# Patient Record
Sex: MALE | Race: BLACK OR AFRICAN AMERICAN | Employment: UNEMPLOYED | URBAN - METROPOLITAN AREA
[De-identification: names, ages, dates, MRNs, and addresses within clinical notes are randomized per-mention and may not be internally consistent; named-entity substitution may affect disease eponyms.]

---

## 2019-04-24 ENCOUNTER — HOSPITAL ENCOUNTER (EMERGENCY)
Age: 49
Discharge: HOME | End: 2019-04-25
Attending: EMERGENCY MEDICINE | Admitting: EMERGENCY MEDICINE
Payer: SELF-PAY

## 2019-04-24 ENCOUNTER — APPOINTMENT (OUTPATIENT)
Dept: GENERAL RADIOLOGY | Age: 49
End: 2019-04-24
Attending: EMERGENCY MEDICINE
Payer: SELF-PAY

## 2019-04-24 VITALS
BODY MASS INDEX: 27.32 KG/M2 | SYSTOLIC BLOOD PRESSURE: 146 MMHG | DIASTOLIC BLOOD PRESSURE: 97 MMHG | OXYGEN SATURATION: 96 % | HEART RATE: 75 BPM | WEIGHT: 185 LBS | RESPIRATION RATE: 16 BRPM | TEMPERATURE: 98.1 F
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
## 2019-04-24 DIAGNOSIS — S01.81XA FACIAL LACERATION, INITIAL ENCOUNTER: Primary | ICD-10-CM

## 2019-04-24 DIAGNOSIS — F10.929 ALCOHOLIC INTOXICATION WITH COMPLICATION (HCC): ICD-10-CM

## 2019-04-24 PROCEDURE — 90471 IMMUNIZATION ADMIN: CPT

## 2019-04-24 PROCEDURE — 99282 EMERGENCY DEPT VISIT SF MDM: CPT

## 2019-04-24 PROCEDURE — 70150 X-RAY EXAM OF FACIAL BONES: CPT

## 2019-04-24 RX ORDER — LIDOCAINE HYDROCHLORIDE 10 MG/ML
10 INJECTION, SOLUTION EPIDURAL; INFILTRATION; INTRACAUDAL; PERINEURAL ONCE
Status: COMPLETED | OUTPATIENT
Start: 2019-04-24 | End: 2019-04-25

## 2019-04-25 PROCEDURE — 90471 IMMUNIZATION ADMIN: CPT

## 2019-04-25 PROCEDURE — 77030018836 HC SOL IRR NACL ICUM -A

## 2019-04-25 PROCEDURE — 74011250637 HC RX REV CODE- 250/637: Performed by: EMERGENCY MEDICINE

## 2019-04-25 PROCEDURE — 74011000250 HC RX REV CODE- 250: Performed by: EMERGENCY MEDICINE

## 2019-04-25 PROCEDURE — 77030031132 HC SUT NYL COVD -A

## 2019-04-25 PROCEDURE — 90715 TDAP VACCINE 7 YRS/> IM: CPT | Performed by: EMERGENCY MEDICINE

## 2019-04-25 PROCEDURE — 74011250636 HC RX REV CODE- 250/636: Performed by: EMERGENCY MEDICINE

## 2019-04-25 PROCEDURE — 77030031139 HC SUT VCRL2 J&J -A

## 2019-04-25 PROCEDURE — 75810000294 HC INTERM/LAYERED WND RPR

## 2019-04-25 RX ORDER — CEPHALEXIN 250 MG/1
250 CAPSULE ORAL 4 TIMES DAILY
Qty: 20 CAP | Refills: 0 | Status: SHIPPED | OUTPATIENT
Start: 2019-04-25 | End: 2019-04-30

## 2019-04-25 RX ORDER — BACITRACIN ZINC 500 UNIT/G
OINTMENT (GRAM) TOPICAL
Status: COMPLETED | OUTPATIENT
Start: 2019-04-25 | End: 2019-04-25

## 2019-04-25 RX ORDER — CEPHALEXIN 250 MG/1
500 CAPSULE ORAL
Status: COMPLETED | OUTPATIENT
Start: 2019-04-25 | End: 2019-04-25

## 2019-04-25 RX ADMIN — LIDOCAINE HYDROCHLORIDE 10 ML: 10 INJECTION, SOLUTION EPIDURAL; INFILTRATION; INTRACAUDAL; PERINEURAL at 00:05

## 2019-04-25 RX ADMIN — BACITRACIN ZINC: 500 OINTMENT TOPICAL at 00:44

## 2019-04-25 RX ADMIN — CEPHALEXIN 500 MG: 250 CAPSULE ORAL at 00:52

## 2019-04-25 RX ADMIN — TETANUS TOXOID, REDUCED DIPHTHERIA TOXOID AND ACELLULAR PERTUSSIS VACCINE, ADSORBED 0.5 ML: 5; 2.5; 8; 8; 2.5 SUSPENSION INTRAMUSCULAR at 00:05

## 2019-04-25 NOTE — DISCHARGE INSTRUCTIONS
Return for pain, redness, swelling, warmth, fever not resolving with motrin or tylenol, shortness of breath, vomiting, decreased fluid intake, weakness, numbness, dizziness, or any change or concerns. Sutures need to be removed in 5 days. Patient Education        Cuts Closed With Stitches: Care Instructions  Your Care Instructions  A cut can happen anywhere on your body. The doctor used stitches to close the cut. Using stitches also helps the cut heal and reduces scarring. Sometimes pieces of tape called Steri-Strips are put over the stitches. If the cut went deep and through the skin, the doctor may have put in two layers of stitches. The deeper layer brings the deep part of the cut together. These stitches will dissolve and don't need to be removed. The stitches in the upper layer are the ones you see on the cut. You will probably have a bandage over the stitches. You will need to have the stitches removed, usually in 7 to 14 days. The doctor has checked you carefully, but problems can develop later. If you notice any problems or new symptoms, get medical treatment right away. Follow-up care is a key part of your treatment and safety. Be sure to make and go to all appointments, and call your doctor if you are having problems. It's also a good idea to know your test results and keep a list of the medicines you take. How can you care for yourself at home? · Keep the cut dry for the first 24 to 48 hours. After this, you can shower if your doctor okays it. Pat the cut dry. · Don't soak the cut, such as in a bathtub. Your doctor will tell you when it's safe to get the cut wet. · If your doctor told you how to care for your cut, follow your doctor's instructions. If you did not get instructions, follow this general advice:  ? After the first 24 to 48 hours, wash around the cut with clean water 2 times a day. Don't use hydrogen peroxide or alcohol, which can slow healing.   ? You may cover the cut with a thin layer of petroleum jelly, such as Vaseline, and a nonstick bandage. ? Apply more petroleum jelly and replace the bandage as needed. · Prop up the sore area on a pillow anytime you sit or lie down during the next 3 days. Try to keep it above the level of your heart. This will help reduce swelling. · Avoid any activity that could cause your cut to reopen. · Do not remove the stitches on your own. Your doctor will tell you when to come back to have the stitches removed. · Leave Steri-Strips on until they fall off. · Be safe with medicines. Read and follow all instructions on the label. ? If the doctor gave you a prescription medicine for pain, take it as prescribed. ? If you are not taking a prescription pain medicine, ask your doctor if you can take an over-the-counter medicine. When should you call for help? Call your doctor now or seek immediate medical care if:    · You have new pain, or your pain gets worse.     · The skin near the cut is cold or pale or changes color.     · You have tingling, weakness, or numbness near the cut.     · The cut starts to bleed, and blood soaks through the bandage. Oozing small amounts of blood is normal.     · You have trouble moving the area near the cut.     · You have symptoms of infection, such as:  ? Increased pain, swelling, warmth, or redness around the cut.  ? Red streaks leading from the cut.  ? Pus draining from the cut.  ? A fever.    Watch closely for changes in your health, and be sure to contact your doctor if:    · The cut reopens.     · You do not get better as expected. Where can you learn more? Go to http://vilma-didi.info/. Enter R217 in the search box to learn more about \"Cuts Closed With Stitches: Care Instructions. \"  Current as of: September 23, 2018  Content Version: 11.9  © 9897-2481 [employer].  Care instructions adapted under license by [employer] (which disclaims liability or warranty for this information). If you have questions about a medical condition or this instruction, always ask your healthcare professional. [place] [address] any warranty or liability for your use of this information.

## 2019-04-25 NOTE — ED PROVIDER NOTES
Pt c/o facial wound. States drank gin with friends, was walking home about 1 hour pta, when tripped and hit a pole, thinks was wood, but doubts foreign body. Says mild bleeding from wound but resolved from pressure. Denies loc. No neck or back  Pain. No tet for over 10 yrs. No weakness or numbness. Pain mild. Past Medical History:  
Diagnosis Date  Ill-defined condition ETOH History reviewed. No pertinent surgical history. History reviewed. No pertinent family history. Social History Socioeconomic History  Marital status: SINGLE Spouse name: Not on file  Number of children: Not on file  Years of education: Not on file  Highest education level: Not on file Occupational History  Not on file Social Needs  Financial resource strain: Not on file  Food insecurity:  
  Worry: Not on file Inability: Not on file  Transportation needs:  
  Medical: Not on file Non-medical: Not on file Tobacco Use  Smoking status: Former Smoker Substance and Sexual Activity  Alcohol use: Yes Comment: 1/2 pint liqur  Drug use: No  
 Sexual activity: Not on file Lifestyle  Physical activity:  
  Days per week: Not on file Minutes per session: Not on file  Stress: Not on file Relationships  Social connections:  
  Talks on phone: Not on file Gets together: Not on file Attends Nondenominational service: Not on file Active member of club or organization: Not on file Attends meetings of clubs or organizations: Not on file Relationship status: Not on file  Intimate partner violence:  
  Fear of current or ex partner: Not on file Emotionally abused: Not on file Physically abused: Not on file Forced sexual activity: Not on file Other Topics Concern  Not on file Social History Narrative  Not on file ALLERGIES: Patient has no known allergies. Review of Systems Constitutional: Negative for diaphoresis and fever. HENT: Negative for congestion. Respiratory: Negative for shortness of breath. Cardiovascular: Negative for chest pain. Gastrointestinal: Negative for nausea. Musculoskeletal: Negative for back pain. Skin: Positive for wound. Neurological: Negative for dizziness and numbness. All other systems reviewed and are negative. Vitals:  
 04/24/19 2242 BP: (!) 146/97 Pulse: 75 Resp: 16 Temp: 98.1 °F (36.7 °C) SpO2: 96% Weight: 83.9 kg (185 lb) Physical Exam  
Constitutional: He is oriented to person, place, and time. He appears well-developed. HENT:  
Head: Normocephalic.  
+ 5 cm linear rt mid cheek wound. No bleeding. Deep through subcut layer. Face nv, inc sensation rt face throughout. Nl rom rt face. Eyes: Conjunctivae are normal.  
Neck: Normal range of motion. Cardiovascular: Normal rate and regular rhythm. Pulmonary/Chest: Effort normal. He has no wheezes. Abdominal: Soft. There is no tenderness. Musculoskeletal: He exhibits no tenderness. Neurological: He is alert and oriented to person, place, and time. etoh on breath but non slurred speech, a and o x 3 Skin: Skin is warm and dry. Capillary refill takes less than 2 seconds. No rash noted. Psychiatric: He has a normal mood and affect. Nursing note and vitals reviewed. [facility] Wound Repair 
Date/Time: 4/25/2019 12:48 AM 
Performed by: attendingPreparation: skin prepped with Betadine Pre-procedure re-eval: Immediately prior to the procedure, the patient was reevaluated and found suitable for the planned procedure and any planned medications. Time out: Immediately prior to the procedure a time out was called to verify the correct patient, procedure, equipment, staff and marking as appropriate. Carmita Clark Location details: face Wound length:2.6 - 7.5 cm Anesthesia: local infiltration Anesthesia: 
 Local Anesthetic: lidocaine 1% without epinephrine Anesthetic total: 6 mL Foreign bodies: no foreign bodies Irrigation solution: saline Irrigation method: syringe Debridement: minimal 
Skin closure: 6-0 nylon Subcutaneous closure: Vicryl Number of sutures: 13 (3 subcut, 10 skin) Technique: simple and interrupted Approximation: close Dressing: antibiotic ointment and pressure dressing Patient tolerance: Patient tolerated the procedure well with no immediate complications My total time at bedside, performing this procedure was 16-30 minutes. Vitals: 
Patient Vitals for the past 12 hrs: 
 Temp Pulse Resp BP SpO2  
04/24/19 2242 98.1 °F (36.7 °C) 75 16 (!) 146/97 96 % Medications ordered:  
Medications diph,Pertuss(AC),Tet Vac-PF (BOOSTRIX) suspension 0.5 mL (0.5 mL IntraMUSCular Given 4/25/19 0005) lidocaine (PF) (XYLOCAINE) 10 mg/mL (1 %) injection 10 mL (10 mL SubCUTAneous Given by Provider 4/25/19 0005) bacitracin zinc (BACITRACIN) 500 unit/gram ointment ( Topical Given 4/25/19 0044) cephALEXin (KEFLEX) capsule 500 mg (500 mg Oral Given 4/25/19 0052) Lab findings: 
No results found for this or any previous visit (from the past 12 hour(s)). X-Ray, CT or other radiology findings or impressions: XR FACIAL BONES MIN 3 V    (Results Pending) Progress notes, Consult notes or additional Procedure notes:  
Wound closed w double layer closure after copious irrigation, 200 ml. Not c/w facial nerve injury. Nvi. No fb. Stable for dc and close f/u. No emc. Not c/w intracran injury or cerv injury. No ind for further imagine. 12:59 AM pt ambulated w no ataxia, non slurred speech, sister at bedside, says no monitor pt closely. Diagnosis: 1. Facial laceration, initial encounter 2. Fall, initial encounter 3. Alcoholic intoxication with complication ([facility] [address] Disposition: home Follow-up Information Follow up With Specialties Details Why Contact Info [facility] Schedule an appointment as soon as possible for a visit in 2 days  [address] 
[phone] [address] [facility] EMERGENCY DEPT Emergency Medicine Go to As needed [address] [id number] [phone] Patient's Medications Start Taking CEPHALEXIN (KEFLEX) 250 MG CAPSULE    Take 1 Cap by mouth four (4) times daily for 5 days. Continue Taking No medications on file These Medications have changed No medications on file Stop Taking No medications on file

## 2019-04-25 NOTE — ED NOTES
I have reviewed discharge instructions with the caregiver. The caregiver verbalized understanding. Medication teaching given, to include name, dose, action, and side effects. Patient verbalized understanding of medications. Encouraged patient to voice any concerns with reassurance provided. Patient armband removed and given to patient to take home. Patient was informed of the privacy risks if armband lost or stolen Patient Discharged in stable condition.

## 2019-05-10 ENCOUNTER — HOSPITAL ENCOUNTER (EMERGENCY)
Age: 49
Discharge: HOME | End: 2019-05-10
Attending: EMERGENCY MEDICINE
Payer: SELF-PAY

## 2019-05-10 VITALS
RESPIRATION RATE: 18 BRPM | SYSTOLIC BLOOD PRESSURE: 174 MMHG | TEMPERATURE: 99.1 F | HEART RATE: 68 BPM | WEIGHT: 180 LBS | DIASTOLIC BLOOD PRESSURE: 104 MMHG | HEIGHT: 71 IN | BODY MASS INDEX: 25.2 KG/M2 | OXYGEN SATURATION: 97 %

## 2019-05-10 DIAGNOSIS — Z48.02 VISIT FOR SUTURE REMOVAL: Primary | ICD-10-CM

## 2019-05-10 DIAGNOSIS — R03.0 ELEVATED BLOOD PRESSURE READING: ICD-10-CM

## 2019-05-10 PROCEDURE — 75810000275 HC EMERGENCY DEPT VISIT NO LEVEL OF CARE

## 2019-05-10 NOTE — ED PROVIDER NOTES
EMERGENCY DEPARTMENT HISTORY AND PHYSICAL EXAM 
 
9:39 AM 
 
 
Date: 5/10/2019 Patient Name: Tra Hogan History of Presenting Illness Chief Complaint Patient presents with  Suture Removal  
 
 
 
History Provided By: Patient Additional History (Context): Tra Hogan is a 53 y.o. male with Past medical history of alcohol abuse who presents with chief complaint of suture removal.  Patient states that stitches were placed in his right cheek area about 2 weeks ago and states this time to be removed. He denies any fever redness or drainage from the area. He does report that a couple of the sutures popped out some days ago. No other complaints. PCP: None Past History Past Medical History: 
Past Medical History:  
Diagnosis Date  Ill-defined condition ETOH Past Surgical History: 
History reviewed. No pertinent surgical history. Family History: 
History reviewed. No pertinent family history. Social History: 
Social History Tobacco Use  Smoking status: Former Smoker Substance Use Topics  Alcohol use: Yes Comment: 1/2 pint liqur  Drug use: No  
 
 
Allergies: 
No Known Allergies Review of Systems Review of Systems Constitutional: Negative for chills and fever. Gastrointestinal: Negative for nausea and vomiting. Musculoskeletal: Negative for arthralgias and neck stiffness. Skin: Negative for color change and rash. Stitches in right cheek area Neurological: Negative for dizziness, weakness, numbness and headaches. Hematological: Does not bruise/bleed easily. Psychiatric/Behavioral: Negative for confusion and dysphoric mood. All other systems reviewed and are negative. Physical Exam  
 
Visit Vitals BP (!) 174/104 (BP 1 Location: Left arm, BP Patient Position: Sitting) Pulse 68 Temp 99.1 °F (37.3 °C) Resp 18 Ht 5' 11\" (1.803 m) Wt 81.6 kg (180 lb) SpO2 97% BMI 25.10 kg/m² Physical Exam  
 Constitutional: He is oriented to person, place, and time. He appears well-developed and well-nourished. No distress. HENT:  
Head: Normocephalic and atraumatic. Mouth/Throat: Oropharynx is clear and moist.  
Eyes: Pupils are equal, round, and reactive to light. Conjunctivae are normal. No scleral icterus. Neck: Normal range of motion. Neck supple. Cardiovascular: Normal rate and intact distal pulses. Capillary refill < 3 seconds Pulmonary/Chest: Effort normal. No respiratory distress. Abdominal: Bowel sounds are normal.  
Musculoskeletal: Normal range of motion. Lymphadenopathy:  
  He has no cervical adenopathy. Neurological: He is alert and oriented to person, place, and time. No cranial nerve deficit. Skin: Skin is warm and dry. He is not diaphoretic. Sutures in right cheek area No erythema, no drainage, no signs of infection, nontender at the site Sutures appropriate to be removed Psychiatric: He has a normal mood and affect. His behavior is normal.  
Nursing note and vitals reviewed. Diagnostic Study Results Labs - No results found for this or any previous visit (from the past 12 hour(s)). Radiologic Studies - No orders to display Medical Decision Making I am the first provider for this patient. I reviewed the vital signs, available nursing notes, past medical history, past surgical history, family history and social history. Vital Signs-Reviewed the patient's vital signs. Records Reviewed: Nursing Notes and Old Medical Records (Time of Review: 9:39 AM) Provider Notes (Medical Decision Making): Here for suture removal 
 
No signs of infection, is appropriate to remove stitches MDM Medications - No data to display Procedures Suture removal: Wound cleaned with alcohol, 6 intact sutures removed, 2 partially intact sutures were removed, thus 8 of the skin sutures out of 10 remained (2 of those being partial sutures) and were removed. There were total of 10 sutures in the skin along with 3 deep subcutaneous initially patient states some popped out. Wound is well approximated and healing ED Course: Progress Notes, Reevaluation, and Consults: 
I have reassessed the patient. I have discussed the workup, results and plan with the patient and patient is in agreement. Patient was discharge in stable condition. Patient was given outpatient follow up. Patient is to return to emergency department if any new or worsening condition. Diagnosis Clinical Impression: 1. Visit for suture removal   
2. Elevated blood pressure reading Disposition: Discharged Follow-up Information Follow up With Specialties Details Why Contact [facility]  Schedule an appointment as soon as possible for a visit in 3 days  [address] [address] 
[phone] Evon [address]  Call today  DR. WEISS'S [address] [address] 
[phone] [facility] EMERGENCY DEPT Emergency Medicine  As needed, If symptoms worsen [address] [id number] [phone] Patient's Medications No medications on file DO Clary Haider medical dictation software was used for portions of this report. Unintended transcription errors may occur. My signature above authenticates this document and my orders, the final   
diagnosis (es), discharge prescription (s), and instructions in the Epic   
record.

## 2019-05-10 NOTE — DISCHARGE INSTRUCTIONS
Elevated Blood Pressure: Care Instructions  Your Care Instructions    Blood pressure is a measure of how hard the blood pushes against the walls of your arteries. It's normal for blood pressure to go up and down throughout the day. But if it stays up over time, you have high blood pressure. Two numbers tell you your blood pressure. The first number is the systolic pressure. It shows how hard the blood pushes when your heart is pumping. The second number is the diastolic pressure. It shows how hard the blood pushes between heartbeats, when your heart is relaxed and filling with blood. An ideal blood pressure in adults is less than 120/80 (say \"120 over 80\"). High blood pressure is 140/90 or higher. You have high blood pressure if your top number is 140 or higher or your bottom number is 90 or higher, or both. The main test for high blood pressure is simple, fast, and painless. To diagnose high blood pressure, your doctor will test your blood pressure at different times. After testing your blood pressure, your doctor may ask you to test it again when you are home. If you are diagnosed with high blood pressure, you can work with your doctor to make a long-term plan to manage it. Follow-up care is a key part of your treatment and safety. Be sure to make and go to all appointments, and call your doctor if you are having problems. It's also a good idea to know your test results and keep a list of the medicines you take. How can you care for yourself at home? · Do not smoke. Smoking increases your risk for heart attack and stroke. If you need help quitting, talk to your doctor about stop-smoking programs and medicines. These can increase your chances of quitting for good. · Stay at a healthy weight. · Try to limit how much sodium you eat to less than 2,300 milligrams (mg) a day. Your doctor may ask you to try to eat less than 1,500 mg a day. · Be physically active.  Get at least 30 minutes of exercise on most days of the week. Walking is a good choice. You also may want to do other activities, such as running, swimming, cycling, or playing tennis or team sports. · Avoid or limit alcohol. Talk to your doctor about whether you can drink any alcohol. · Eat plenty of fruits, vegetables, and low-fat dairy products. Eat less saturated and total fats. · Learn how to check your blood pressure at home. When should you call for help? Call your doctor now or seek immediate medical care if:  ? · Your blood pressure is much higher than normal (such as 180/110 or higher). ? · You think high blood pressure is causing symptoms such as:  ¨ Severe headache. ¨ Blurry vision. ? Watch closely for changes in your health, and be sure to contact your doctor if:  ? · You do not get better as expected. Where can you learn more? Go to http://vilmaActimis Pharmaceuticalsdidi.info/. Enter P180 in the search box to learn more about \"Elevated Blood Pressure: Care Instructions. \"  Current as of: September 21, 2016  Content Version: 11.4  © 0304-5024 [employer]. Care instructions adapted under license by [employer] (which disclaims liability or warranty for this information). If you have questions about a medical condition or this instruction, always ask your healthcare professional. [address] any warranty or liability for your use of this information. Patient Education        Learning About Stitches and Staples Removal  When are stitches and staples removed? Your doctor will tell you when to have your stitches or staples removed, usually in 7 to 14 days. How long you'll be told to wait will depend on things like where the wound is located, how big and how deep the wound is, and what your general health is like. Do not remove the stitches on your own. Stitches on the face are usually removed within a week.  But stitches and staples on other areas of the body, such as on the back or belly or over a joint, may need to stay in place longer, often a week or two. Be sure to follow your doctor's instructions. How are stitches and staples removed? It usually doesn't hurt when the doctor removes the stitches or staples. You may feel a tug as each stitch or staple is removed. · You will either be seated or lying down. · To remove stitches, the doctor will use scissors to cut each of the knots and then pull the threads out. · To remove staples, the doctor will use a tool to take out the staples one at a time. · The area may still feel tender after the stitches or staples are gone. But it should feel better within a few minutes or up to a few hours. What can you expect after stitches and staples are removed? Depending on the type and location of the cut, you will have a scar. Scars usually fade over time. Keep the area clean, but you won't need a bandage. When should you call for help? Call your doctor now or seek immediate medical care if :  · You have new pain, or your pain gets worse. · You have trouble moving the area near the scar. · You have symptoms of infection, such as:  ? Increased pain, swelling, warmth, or redness around the scar. ? Red streaks leading from the scar. ? Pus draining from the scar. ? A fever. Watch closely for changes in your health, and be sure to contact your doctor if:  · The scar opens. · You do not get better as expected. Follow-up care is a key part of your treatment and safety. Be sure to make and go to all appointments, and call your doctor if you do not get better as expected. It's also a good idea to keep a list of the medicines you take. Where can you learn more? Go to http://vilma-didi.info/. Enter G112 in the search box to learn more about \"Learning About Stitches and Staples Removal.\"  Current as of: September 23, 2018  Content Version: 11.9  © 0822-6546 [employer].  Care instructions adapted under license by [employer] (which disclaims liability or warranty for this information). If you have questions about a medical condition or this instruction, always ask your healthcare professional. [address] any warranty or liability for your use of this information.

## 2019-05-10 NOTE — ED TRIAGE NOTES
Pt presents for removal of sutures on right cheek. Stats sutures have been in place about 2 weeks. Denies any complications with suture site.

## 2019-05-10 NOTE — ED NOTES
Sutures removed from r cheek, removed 6 complete sutures and 2 partials, edges well approx, wound care instructions given to pt. Pt verbalized understanding.

## 2019-11-20 ENCOUNTER — APPOINTMENT (OUTPATIENT)
Dept: GENERAL RADIOLOGY | Age: 49
End: 2019-11-20
Attending: EMERGENCY MEDICINE
Payer: SELF-PAY

## 2019-11-20 ENCOUNTER — HOSPITAL ENCOUNTER (EMERGENCY)
Age: 49
Discharge: HOME | End: 2019-11-20
Attending: EMERGENCY MEDICINE
Payer: SELF-PAY

## 2019-11-20 VITALS
RESPIRATION RATE: 17 BRPM | OXYGEN SATURATION: 100 % | DIASTOLIC BLOOD PRESSURE: 78 MMHG | HEART RATE: 76 BPM | SYSTOLIC BLOOD PRESSURE: 154 MMHG | TEMPERATURE: 98 F

## 2019-11-20 DIAGNOSIS — R55 NEAR SYNCOPE: ICD-10-CM

## 2019-11-20 DIAGNOSIS — T68.XXXA HYPOTHERMIA, INITIAL ENCOUNTER: ICD-10-CM

## 2019-11-20 DIAGNOSIS — F19.10 SUBSTANCE ABUSE (HCC): Primary | ICD-10-CM

## 2019-11-20 LAB
ALBUMIN SERPL-MCNC: 4.8 G/DL (ref 3.4–5)
ALBUMIN/GLOB SERPL: 1.1 {RATIO} (ref 0.8–1.7)
ALP SERPL-CCNC: 98 U/L (ref 45–117)
ALT SERPL-CCNC: 41 U/L (ref 16–61)
AMPHET UR QL SCN: NEGATIVE
ANION GAP SERPL CALC-SCNC: 10 MMOL/L (ref 3–18)
ANION GAP SERPL CALC-SCNC: 14 MMOL/L (ref 3–18)
APPEARANCE UR: CLEAR
AST SERPL-CCNC: 88 U/L (ref 10–38)
BACTERIA URNS QL MICRO: ABNORMAL /HPF
BARBITURATES UR QL SCN: NEGATIVE
BASOPHILS # BLD: 0 K/UL (ref 0–0.1)
BASOPHILS NFR BLD: 0 % (ref 0–2)
BENZODIAZ UR QL: NEGATIVE
BILIRUB DIRECT SERPL-MCNC: 0.2 MG/DL (ref 0–0.2)
BILIRUB SERPL-MCNC: 0.8 MG/DL (ref 0.2–1)
BILIRUB UR QL: NEGATIVE
BUN SERPL-MCNC: 23 MG/DL (ref 7–18)
BUN SERPL-MCNC: 25 MG/DL (ref 7–18)
BUN/CREAT SERPL: 17 (ref 12–20)
BUN/CREAT SERPL: 27 (ref 12–20)
CALCIUM SERPL-MCNC: 7.4 MG/DL (ref 8.5–10.1)
CALCIUM SERPL-MCNC: 9.5 MG/DL (ref 8.5–10.1)
CANNABINOIDS UR QL SCN: POSITIVE
CHLORIDE SERPL-SCNC: 105 MMOL/L (ref 100–111)
CHLORIDE SERPL-SCNC: 97 MMOL/L (ref 100–111)
CO2 SERPL-SCNC: 20 MMOL/L (ref 21–32)
CO2 SERPL-SCNC: 22 MMOL/L (ref 21–32)
COCAINE UR QL SCN: NEGATIVE
COLOR UR: YELLOW
CREAT SERPL-MCNC: 0.93 MG/DL (ref 0.6–1.3)
CREAT SERPL-MCNC: 1.32 MG/DL (ref 0.6–1.3)
DIFFERENTIAL METHOD BLD: ABNORMAL
EOSINOPHIL # BLD: 0.2 K/UL (ref 0–0.4)
EOSINOPHIL NFR BLD: 3 % (ref 0–5)
EPITH CASTS URNS QL MICRO: ABNORMAL /LPF (ref 0–5)
ERYTHROCYTE [DISTWIDTH] IN BLOOD BY AUTOMATED COUNT: 13.5 % (ref 11.6–14.5)
ETHANOL SERPL-MCNC: 53 MG/DL (ref 0–3)
FLUAV AG NPH QL IA: NEGATIVE
FLUBV AG NOSE QL IA: NEGATIVE
GLOBULIN SER CALC-MCNC: 4.3 G/DL (ref 2–4)
GLUCOSE BLD STRIP.AUTO-MCNC: 43 MG/DL (ref 70–110)
GLUCOSE BLD STRIP.AUTO-MCNC: 44 MG/DL (ref 70–110)
GLUCOSE BLD STRIP.AUTO-MCNC: 53 MG/DL (ref 70–110)
GLUCOSE BLD STRIP.AUTO-MCNC: 62 MG/DL (ref 70–110)
GLUCOSE BLD STRIP.AUTO-MCNC: 83 MG/DL (ref 70–110)
GLUCOSE SERPL-MCNC: 38 MG/DL (ref 74–99)
GLUCOSE SERPL-MCNC: 53 MG/DL (ref 74–99)
GLUCOSE UR STRIP.AUTO-MCNC: NEGATIVE MG/DL
HCT VFR BLD AUTO: 42.7 % (ref 36–48)
HDSCOM,HDSCOM: ABNORMAL
HGB BLD-MCNC: 14.3 G/DL (ref 13–16)
HGB UR QL STRIP: NEGATIVE
KETONES UR QL STRIP.AUTO: 40 MG/DL
LACTATE BLD-SCNC: 5.25 MMOL/L (ref 0.4–2)
LEUKOCYTE ESTERASE UR QL STRIP.AUTO: NEGATIVE
LYMPHOCYTES # BLD: 2.5 K/UL (ref 0.9–3.6)
LYMPHOCYTES NFR BLD: 35 % (ref 21–52)
MCH RBC QN AUTO: 35.6 PG (ref 24–34)
MCHC RBC AUTO-ENTMCNC: 33.5 G/DL (ref 31–37)
MCV RBC AUTO: 106.2 FL (ref 74–97)
METHADONE UR QL: NEGATIVE
MONOCYTES # BLD: 0.4 K/UL (ref 0.05–1.2)
MONOCYTES NFR BLD: 6 % (ref 3–10)
NEUTS SEG # BLD: 4.1 K/UL (ref 1.8–8)
NEUTS SEG NFR BLD: 56 % (ref 40–73)
NITRITE UR QL STRIP.AUTO: NEGATIVE
OPIATES UR QL: NEGATIVE
PCP UR QL: NEGATIVE
PH UR STRIP: 5 [PH] (ref 5–8)
PLATELET # BLD AUTO: 226 K/UL (ref 135–420)
PMV BLD AUTO: 10.5 FL (ref 9.2–11.8)
POTASSIUM SERPL-SCNC: 4.9 MMOL/L (ref 3.5–5.5)
POTASSIUM SERPL-SCNC: 5.3 MMOL/L (ref 3.5–5.5)
PROT SERPL-MCNC: 9.1 G/DL (ref 6.4–8.2)
PROT UR STRIP-MCNC: 30 MG/DL
RBC # BLD AUTO: 4.02 M/UL (ref 4.7–5.5)
RBC #/AREA URNS HPF: ABNORMAL /HPF (ref 0–5)
SODIUM SERPL-SCNC: 133 MMOL/L (ref 136–145)
SODIUM SERPL-SCNC: 135 MMOL/L (ref 136–145)
SP GR UR REFRACTOMETRY: 1.02 (ref 1–1.03)
T4 FREE SERPL-MCNC: 1.2 NG/DL (ref 0.7–1.5)
TROPONIN I SERPL-MCNC: <0.02 NG/ML (ref 0–0.04)
TSH SERPL DL<=0.05 MIU/L-ACNC: 0.59 UIU/ML (ref 0.36–3.74)
UROBILINOGEN UR QL STRIP.AUTO: 1 EU/DL (ref 0.2–1)
WBC # BLD AUTO: 7.3 K/UL (ref 4.6–13.2)
WBC URNS QL MICRO: ABNORMAL /HPF (ref 0–4)

## 2019-11-20 PROCEDURE — 99285 EMERGENCY DEPT VISIT HI MDM: CPT

## 2019-11-20 PROCEDURE — 96360 HYDRATION IV INFUSION INIT: CPT

## 2019-11-20 PROCEDURE — 84439 ASSAY OF FREE THYROXINE: CPT

## 2019-11-20 PROCEDURE — 85025 COMPLETE CBC W/AUTO DIFF WBC: CPT

## 2019-11-20 PROCEDURE — 81001 URINALYSIS AUTO W/SCOPE: CPT

## 2019-11-20 PROCEDURE — 80307 DRUG TEST PRSMV CHEM ANLYZR: CPT

## 2019-11-20 PROCEDURE — 93005 ELECTROCARDIOGRAM TRACING: CPT

## 2019-11-20 PROCEDURE — 71045 X-RAY EXAM CHEST 1 VIEW: CPT

## 2019-11-20 PROCEDURE — 80076 HEPATIC FUNCTION PANEL: CPT

## 2019-11-20 PROCEDURE — 74011250636 HC RX REV CODE- 250/636: Performed by: EMERGENCY MEDICINE

## 2019-11-20 PROCEDURE — 82962 GLUCOSE BLOOD TEST: CPT

## 2019-11-20 PROCEDURE — 83605 ASSAY OF LACTIC ACID: CPT

## 2019-11-20 PROCEDURE — 87804 INFLUENZA ASSAY W/OPTIC: CPT

## 2019-11-20 PROCEDURE — 80048 BASIC METABOLIC PNL TOTAL CA: CPT

## 2019-11-20 PROCEDURE — 84443 ASSAY THYROID STIM HORMONE: CPT

## 2019-11-20 PROCEDURE — 84484 ASSAY OF TROPONIN QUANT: CPT

## 2019-11-20 RX ADMIN — SODIUM CHLORIDE 1000 ML: 900 INJECTION, SOLUTION INTRAVENOUS at 03:45

## 2019-11-20 NOTE — ED PROVIDER NOTES
EMERGENCY DEPARTMENT HISTORY AND PHYSICAL EXAM    5:09 AM      Date: 11/20/2019  Patient Name: Blas Murdock    History of Presenting Illness     Chief Complaint   Patient presents with    Dizziness         History Provided By: Patient and EMS    Additional History (Context): Blas Murdock is a 51 y.o. male with no reported past medical history who presents with complaint of an episode of lightheadedness, near loss of consciousness, sweating at home. He states he was sitting on the couch watching TV when this happened. He did not have any full loss of consciousness. Denies any chest pain, shortness of breath, nausea, vomiting, abdominal pain. He does state that he had had some cough, congestion and muscle aches for the last couple days prior to this. Notes sick contacts at home. PCP: None        Past History     Past Medical History:  Past Medical History:   Diagnosis Date    Ill-defined condition     ETOH       Past Surgical History:  History reviewed. No pertinent surgical history. Family History:  History reviewed. No pertinent family history. Social History:  Social History     Tobacco Use    Smoking status: Former Smoker   Substance Use Topics    Alcohol use: Yes     Comment: 1/2 pint liqur    Drug use: No       Allergies:  No Known Allergies      Review of Systems       Review of Systems   Constitutional: Positive for diaphoresis. Negative for activity change and appetite change. HENT: Positive for congestion. Eyes: Negative for visual disturbance. Respiratory: Positive for cough. Negative for shortness of breath. Cardiovascular: Negative for chest pain. Gastrointestinal: Negative for abdominal pain, diarrhea, nausea and vomiting. Genitourinary: Negative for dysuria. Musculoskeletal: Positive for myalgias. Negative for arthralgias. Skin: Negative for rash. Neurological: Positive for light-headedness. Negative for weakness and numbness.          Physical Exam     Visit Vitals  BP (!) 178/99 (BP 1 Location: Left arm)   Pulse 75   Temp 98.1 °F (36.7 °C)   Resp 14   SpO2 100%         Physical Exam  Vitals signs and nursing note reviewed. Constitutional:       Appearance: He is well-developed. HENT:      Head: Normocephalic and atraumatic. Eyes:      Conjunctiva/sclera: Conjunctivae normal.   Neck:      Musculoskeletal: Normal range of motion and neck supple. Vascular: No JVD. Cardiovascular:      Rate and Rhythm: Regular rhythm. Bradycardia present. Heart sounds: Normal heart sounds. No murmur. Pulmonary:      Effort: Pulmonary effort is normal.      Breath sounds: Normal breath sounds. Abdominal:      General: Bowel sounds are normal. There is no distension. Palpations: Abdomen is soft. Tenderness: There is no tenderness. Musculoskeletal: Normal range of motion. General: No deformity. Lymphadenopathy:      Cervical: No cervical adenopathy. Skin:     General: Skin is warm and dry. Findings: No rash. Neurological:      Mental Status: He is alert and oriented to person, place, and time. Coordination: Coordination normal.           Diagnostic Study Results     Labs -  Recent Results (from the past 12 hour(s))   ETHYL ALCOHOL    Collection Time: 11/20/19  3:33 AM   Result Value Ref Range    ALCOHOL(ETHYL),SERUM 53 (H) 0 - 3 MG/DL   CBC WITH AUTOMATED DIFF    Collection Time: 11/20/19  3:33 AM   Result Value Ref Range    WBC 7.3 4.6 - 13.2 K/uL    RBC 4.02 (L) 4.70 - 5.50 M/uL    HGB 14.3 13.0 - 16.0 g/dL    HCT 42.7 36.0 - 48.0 %    .2 (H) 74.0 - 97.0 FL    MCH 35.6 (H) 24.0 - 34.0 PG    MCHC 33.5 31.0 - 37.0 g/dL    RDW 13.5 11.6 - 14.5 %    PLATELET 216 395 - 400 K/uL    MPV 10.5 9.2 - 11.8 FL    NEUTROPHILS 56 40 - 73 %    LYMPHOCYTES 35 21 - 52 %    MONOCYTES 6 3 - 10 %    EOSINOPHILS 3 0 - 5 %    BASOPHILS 0 0 - 2 %    ABS. NEUTROPHILS 4.1 1.8 - 8.0 K/UL    ABS. LYMPHOCYTES 2.5 0.9 - 3.6 K/UL    ABS.  MONOCYTES 0.4 0.05 - 1.2 K/UL    ABS. EOSINOPHILS 0.2 0.0 - 0.4 K/UL    ABS. BASOPHILS 0.0 0.0 - 0.1 K/UL    DF AUTOMATED     METABOLIC PANEL, BASIC    Collection Time: 11/20/19  3:33 AM   Result Value Ref Range    Sodium 133 (L) 136 - 145 mmol/L    Potassium 5.3 3.5 - 5.5 mmol/L    Chloride 97 (L) 100 - 111 mmol/L    CO2 22 21 - 32 mmol/L    Anion gap 14 3.0 - 18 mmol/L    Glucose 38 (LL) 74 - 99 mg/dL    BUN 23 (H) 7.0 - 18 MG/DL    Creatinine 1.32 (H) 0.6 - 1.3 MG/DL    BUN/Creatinine ratio 17 12 - 20      GFR est AA >60 >60 ml/min/1.73m2    GFR est non-AA 58 (L) >60 ml/min/1.73m2    Calcium 9.5 8.5 - 10.1 MG/DL   T4, FREE    Collection Time: 11/20/19  3:33 AM   Result Value Ref Range    T4, Free 1.2 0.7 - 1.5 NG/DL   HEPATIC FUNCTION PANEL    Collection Time: 11/20/19  3:33 AM   Result Value Ref Range    Protein, total 9.1 (H) 6.4 - 8.2 g/dL    Albumin 4.8 3.4 - 5.0 g/dL    Globulin 4.3 (H) 2.0 - 4.0 g/dL    A-G Ratio 1.1 0.8 - 1.7      Bilirubin, total 0.8 0.2 - 1.0 MG/DL    Bilirubin, direct 0.2 0.0 - 0.2 MG/DL    Alk.  phosphatase 98 45 - 117 U/L    AST (SGOT) 88 (H) 10 - 38 U/L    ALT (SGPT) 41 16 - 61 U/L   TROPONIN I    Collection Time: 11/20/19  3:33 AM   Result Value Ref Range    Troponin-I, QT <0.02 0.0 - 0.045 NG/ML   TSH 3RD GENERATION    Collection Time: 11/20/19  3:33 AM   Result Value Ref Range    TSH 0.59 0.36 - 3.74 uIU/mL   DRUG SCREEN, URINE    Collection Time: 11/20/19  4:26 AM   Result Value Ref Range    BENZODIAZEPINES NEGATIVE  NEG      BARBITURATES NEGATIVE  NEG      THC (TH-CANNABINOL) POSITIVE (A) NEG      OPIATES NEGATIVE  NEG      PCP(PHENCYCLIDINE) NEGATIVE  NEG      COCAINE NEGATIVE  NEG      AMPHETAMINES NEGATIVE  NEG      METHADONE NEGATIVE  NEG      HDSCOM (NOTE)    URINALYSIS W/ RFLX MICROSCOPIC    Collection Time: 11/20/19  4:26 AM   Result Value Ref Range    Color YELLOW      Appearance CLEAR      Specific gravity 1.021 1.003 - 1.030      pH (UA) 5.0 5.0 - 8.0      Protein 30 (A) NEG mg/dL Glucose NEGATIVE  NEG mg/dL    Ketone 40 (A) NEG mg/dL    Bilirubin NEGATIVE  NEG      Blood NEGATIVE  NEG      Urobilinogen 1.0 0.2 - 1.0 EU/dL    Nitrites NEGATIVE  NEG      Leukocyte Esterase NEGATIVE  NEG     URINE MICROSCOPIC ONLY    Collection Time: 11/20/19  4:26 AM   Result Value Ref Range    WBC 1 to 4 0 - 4 /hpf    RBC 0 to 2 0 - 5 /hpf    Epithelial cells FEW 0 - 5 /lpf    Bacteria FEW (A) NEG /hpf   INFLUENZA A & B AG (RAPID TEST)    Collection Time: 11/20/19  5:39 AM   Result Value Ref Range    Influenza A Antigen NEGATIVE  NEG      Influenza B Antigen NEGATIVE  NEG     GLUCOSE, POC    Collection Time: 11/20/19  5:48 AM   Result Value Ref Range    Glucose (POC) 53 (LL) 70 - 110 mg/dL       Radiologic Studies -   XR CHEST PORT   Final Result   Impression:     No radiographic evidence of acute cardiopulmonary process. Medical Decision Making   I am the first provider for this patient. I reviewed the vital signs, available nursing notes, past medical history, past surgical history, family history and social history. Vital Signs-Reviewed the patient's vital signs. EKG:  Normal sinus rhythm, rate 54, , QTc 485. No acute ST or T wave changes, no STEMI. Records Reviewed: Nursing Notes (Time of Review: 5:09 AM)      Provider Notes (Medical Decision Making):   Marie Sorensen is a 51 y.o. male with no reported past medical history who presents with complaint of an episode of lightheadedness, near loss of consciousness, sweating at home. He states he was sitting on the couch watching TV when this happened. He did not have any full loss of consciousness. Denies any chest pain, shortness of breath, nausea, vomiting, abdominal pain. He does state that he had had some cough, congestion and muscle aches for the last couple days prior to this. Notes sick contacts at home.   Patient noted to be bradycardic, hypoglycemic, hypothermic in the emergency department, but no localizing symptoms. Differential Diagnosis: We will evaluate for metabolic derangement, influenza, but low suspicion for bacterial infection or sepsis, will evaluate thyroid studies, substance abuse    Testing: CBC, CMP, TSH, free T4, alcohol level, urine drug screen, urinalysis, influenza screen  Treatments: Bear hugger for rewarming, will given food to eat and will reevaluate    Re-evaluations:  Patient's labs have multiple abnormalities including hypoglycemia, some renal insufficiency, possibly elevated lactate. At this time I highly suspect these abnormalities are more secondary to his hypothermia and inaccurate values rather than true abnormalities. Patient was hydrated with IV fluids, warmed with bear hugger. As he warmed to normal temperature of 98.1, his heart rate improved. He is awake, alert, oriented, feels well and is asymptomatic at this time. BMP redrawn to check for normalization of previously abnormal findings aside suspect that these were due to the hypothermia. Critical Care Time: Critical Care Time:  The services I provided to this patient were to treat and/or prevent clinically significant deterioration that could result in the failure of one or more body systems and/or organ systems due to hypothermia. Services included the following:  -reviewing nursing notes and old charts  -vital sign assessments  -direct patient care  -medication orders and management  -interpreting and reviewing diagnostic studies/labs  -re-evaluations  -documentation time    Aggregate critical care time was 45 minutes, which includes only time during which I was engaged in work directly related to the patient's care as described above, whether I was at bedside or elsewhere in the Emergency Department. It did not include time spent performing other reported procedures or the services of residents, students, nurses, or advance practice providers.        Carol James MD    7:08 AM        Diagnosis Clinical Impression:   1. Substance abuse ([address]    2. Near syncope    3. Hypothermia, initial encounter        Disposition: discharg    Follow-up Information     Follow up With Specialties Details Why [facility]    [facility] EMERGENCY DEPT Emergency Medicine  If symptoms worsen [address]  [phone]    Primary physician  Schedule an appointment as soon as possible for a visit             Patient's Medications    No medications on file     _______________________________    Attestations:  Jarret Goncalves MD acting as a scribe for and in the presence of Ritika Gallegos MD      November 20, 2019 at 7:14 AM       Provider Attestation:      I personally performed the services described in the documentation, reviewed the documentation, as recorded by the scribe in my presence, and it accurately and completely records my words and actions.  November 20, 2019 at 7:14 AM - Ritika Gallegos MD    _______________________________

## 2019-11-20 NOTE — ED TRIAGE NOTES
Patient brought by medic. Patient was at home sitting on his couch and suddenly felt sweaty and dizzy. Patient upon EMS arrival was extremely diaphoretic and had a blood glucose of 44. EMS gave patient 1 oral glucose tab but was unable to obtain a line. Patient reports having a cough and sneezing for the last few days.

## 2019-11-20 NOTE — DISCHARGE INSTRUCTIONS
Patient Education        Hypothermia: Care Instructions  Your Care Instructions  Hypothermia means that your body loses heat faster than it can make heat. You can get it if you spend time in cold air, water, wind, or rain. Most healthy people with mild to moderate hypothermia fully recover. And they don't have lasting problems. But babies and older or sick adults may be more at risk for hypothermia. This is because their bodies do not control temperature as well. Make sure to follow your doctor's instructions for how to get better. It's also important to learn how to protect yourself from hypothermia in the future. Follow-up care is a key part of your treatment and safety. Be sure to make and go to all appointments, and call your doctor if you are having problems. It's also a good idea to know your test results and keep a list of the medicines you take. How can you care for yourself at home? · Take your medicines exactly as prescribed. Call your doctor if you think you are having a problem with your medicine. · To prevent dehydration, drink plenty of fluids, enough so that your urine is light yellow or clear like water. Choose water and other caffeine-free clear liquids until you feel better. If you have kidney, heart, or liver disease and have to limit fluids, talk with your doctor before you increase the amount of fluids you drink. · Get a lot of rest at home, and stay warm. To prevent hypothermia  · Avoid illegal drugs and too much alcohol. They can make you more likely to get hypothermia. · Cover your head, hands, and feet in cold or wet weather. · Try not to sweat a lot if you are out in the cold. · Stay as dry as possible. · Wear layers of loose-fitting clothing. · Pack a kit in your car that has items you will need to stay warm. It may include fire-starting kits and a cigarette lighter, extra clothing, drinking water, and food. You also can bring a sleeping bag.  Two people can warm up more easily by sharing the bag. If you see symptoms in someone who has been in cold weather, keep the person warm and dry and get help quickly. Symptoms include shivering, cold and pale skin, and slurred speech. When should you call for help? Call your doctor now or seek immediate medical care if:    · You are confused or have trouble thinking.     · You are shivering and cannot stop.     · You have signs of needing more fluids. You have sunken eyes and a dry mouth, and you pass only a little dark urine.    Watch closely for changes in your health, and be sure to contact your doctor if:    · You do not get better as expected. Where can you learn more? Go to http://vilma-didi.info/. Enter X610 in the search box to learn more about \"Hypothermia: Care Instructions. \"  Current as of: June 26, 2019  Content Version: 12.2  © 0160-0055 [employer]. Care instructions adapted under license by [employer] (which disclaims liability or warranty for this information). If you have questions about a medical condition or this instruction, always ask your healthcare professional. [address] any warranty or liability for your use of this information. Patient Education        Fainting: Care Instructions  Your Care Instructions    When you faint, or pass out, you lose consciousness for a short time. A brief drop in blood flow to the brain often causes it. When you fall or lie down, more blood flows to your brain and you regain consciousness. Emotional stress, pain, or overheating--especially if you have been standing--can make you faint. In these cases, fainting is usually not serious. But fainting can be a sign of a more serious problem. Your doctor may want you to have more tests to rule out other causes. The treatment you need depends on the reason why you fainted. The doctor has checked you carefully, but problems can develop later.  If you notice any problems or new symptoms, get medical treatment right away. Follow-up care is a key part of your treatment and safety. Be sure to make and go to all appointments, and call your doctor if you are having problems. It's also a good idea to know your test results and keep a list of the medicines you take. How can you care for yourself at home? · Drink plenty of fluids to prevent dehydration. If you have kidney, heart, or liver disease and have to limit fluids, talk with your doctor before you increase your fluid intake. When should you call for help? Call 911 anytime you think you may need emergency care. For example, call if:    · You have symptoms of a heart problem. These may include:  ? Chest pain or pressure. ? Severe trouble breathing. ? A fast or irregular heartbeat. ? Lightheadedness or sudden weakness. ? Coughing up pink, foamy mucus. ? Passing out. After you call 911, the  may tell you to chew 1 adult-strength or 2 to 4 low-dose aspirin. Wait for an ambulance. Do not try to drive yourself.     · You have symptoms of a stroke. These may include:  ? Sudden numbness, tingling, weakness, or loss of movement in your face, arm, or leg, especially on only one side of your body. ? Sudden vision changes. ? Sudden trouble speaking. ? Sudden confusion or trouble understanding simple statements. ? Sudden problems with walking or balance. ? A sudden, severe headache that is different from past headaches.     · You passed out (lost consciousness) again.    Watch closely for changes in your health, and be sure to contact your doctor if:    · You do not get better as expected. Where can you learn more? Go to http://vilma-didi.info/. Enter I924 in the search box to learn more about \"Fainting: Care Instructions. \"  Current as of: June 26, 2019  Content Version: 12.2  © 5745-8047 [employer].  Care instructions adapted under license by [employer] (which disclaims liability or warranty for this information). If you have questions about a medical condition or this instruction, always ask your healthcare professional. [address] any warranty or liability for your use of this information.

## 2019-11-21 LAB
ATRIAL RATE: 54 BPM
CALCULATED P AXIS, ECG09: 75 DEGREES
CALCULATED R AXIS, ECG10: 62 DEGREES
CALCULATED T AXIS, ECG11: 27 DEGREES
DIAGNOSIS, 93000: NORMAL
P-R INTERVAL, ECG05: 156 MS
Q-T INTERVAL, ECG07: 512 MS
QRS DURATION, ECG06: 116 MS
QTC CALCULATION (BEZET), ECG08: 485 MS
VENTRICULAR RATE, ECG03: 54 BPM

## 2020-02-25 ENCOUNTER — HOSPITAL ENCOUNTER (OUTPATIENT)
Dept: LAB | Age: 50
Discharge: HOME | End: 2020-02-25
Payer: COMMERCIAL

## 2020-02-25 LAB
ALBUMIN SERPL-MCNC: 3.5 G/DL (ref 3.4–5)
ALBUMIN/GLOB SERPL: 0.9 {RATIO} (ref 0.8–1.7)
ALP SERPL-CCNC: 77 U/L (ref 45–117)
ALT SERPL-CCNC: 21 U/L (ref 16–61)
ANION GAP SERPL CALC-SCNC: 4 MMOL/L (ref 3–18)
AST SERPL-CCNC: 17 U/L (ref 10–38)
BASOPHILS # BLD: 0 K/UL (ref 0–0.1)
BASOPHILS NFR BLD: 1 % (ref 0–2)
BILIRUB SERPL-MCNC: 0.4 MG/DL (ref 0.2–1)
BUN SERPL-MCNC: 9 MG/DL (ref 7–18)
BUN/CREAT SERPL: 9 (ref 12–20)
CALCIUM SERPL-MCNC: 8.6 MG/DL (ref 8.5–10.1)
CHLORIDE SERPL-SCNC: 110 MMOL/L (ref 100–111)
CHOLEST SERPL-MCNC: 185 MG/DL
CO2 SERPL-SCNC: 29 MMOL/L (ref 21–32)
CREAT SERPL-MCNC: 1.03 MG/DL (ref 0.6–1.3)
DIFFERENTIAL METHOD BLD: ABNORMAL
EOSINOPHIL # BLD: 0.2 K/UL (ref 0–0.4)
EOSINOPHIL NFR BLD: 5 % (ref 0–5)
ERYTHROCYTE [DISTWIDTH] IN BLOOD BY AUTOMATED COUNT: 12.9 % (ref 11.6–14.5)
GLOBULIN SER CALC-MCNC: 3.7 G/DL (ref 2–4)
GLUCOSE SERPL-MCNC: 73 MG/DL (ref 74–99)
HCT VFR BLD AUTO: 33.4 % (ref 36–48)
HDLC SERPL-MCNC: 84 MG/DL (ref 40–60)
HDLC SERPL: 2.2 {RATIO} (ref 0–5)
HGB BLD-MCNC: 10.8 G/DL (ref 13–16)
LDLC SERPL CALC-MCNC: 85 MG/DL (ref 0–100)
LIPID PROFILE,FLP: ABNORMAL
LYMPHOCYTES # BLD: 1.4 K/UL (ref 0.9–3.6)
LYMPHOCYTES NFR BLD: 33 % (ref 21–52)
MCH RBC QN AUTO: 33.2 PG (ref 24–34)
MCHC RBC AUTO-ENTMCNC: 32.3 G/DL (ref 31–37)
MCV RBC AUTO: 102.8 FL (ref 74–97)
MONOCYTES # BLD: 0.6 K/UL (ref 0.05–1.2)
MONOCYTES NFR BLD: 14 % (ref 3–10)
NEUTS SEG # BLD: 2 K/UL (ref 1.8–8)
NEUTS SEG NFR BLD: 47 % (ref 40–73)
PLATELET # BLD AUTO: 280 K/UL (ref 135–420)
PMV BLD AUTO: 10.7 FL (ref 9.2–11.8)
POTASSIUM SERPL-SCNC: 4.1 MMOL/L (ref 3.5–5.5)
PROT SERPL-MCNC: 7.2 G/DL (ref 6.4–8.2)
RBC # BLD AUTO: 3.25 M/UL (ref 4.7–5.5)
SODIUM SERPL-SCNC: 143 MMOL/L (ref 136–145)
T4 SERPL-MCNC: 10 UG/DL (ref 4.5–12.1)
TRIGL SERPL-MCNC: 80 MG/DL (ref ?–150)
TSH SERPL DL<=0.05 MIU/L-ACNC: 0.7 UIU/ML (ref 0.36–3.74)
VLDLC SERPL CALC-MCNC: 16 MG/DL
WBC # BLD AUTO: 4.3 K/UL (ref 4.6–13.2)

## 2020-02-25 PROCEDURE — 84436 ASSAY OF TOTAL THYROXINE: CPT

## 2020-02-25 PROCEDURE — 36415 COLL VENOUS BLD VENIPUNCTURE: CPT

## 2020-02-25 PROCEDURE — 85025 COMPLETE CBC W/AUTO DIFF WBC: CPT

## 2020-02-25 PROCEDURE — 80053 COMPREHEN METABOLIC PANEL: CPT

## 2020-02-25 PROCEDURE — 80061 LIPID PANEL: CPT

## 2021-05-12 ENCOUNTER — HOSPITAL ENCOUNTER (OUTPATIENT)
Dept: GENERAL RADIOLOGY | Age: 51
Discharge: HOME | End: 2021-05-12
Payer: COMMERCIAL

## 2021-05-12 ENCOUNTER — TRANSCRIBE ORDER (OUTPATIENT)
Dept: REGISTRATION | Age: 51
End: 2021-05-12

## 2021-05-12 DIAGNOSIS — M54.2 CERVICALGIA: ICD-10-CM

## 2021-05-12 DIAGNOSIS — M25.562 ACUTE PAIN OF LEFT KNEE: ICD-10-CM

## 2021-05-12 DIAGNOSIS — M54.2 CERVICALGIA: Primary | ICD-10-CM

## 2021-05-12 PROCEDURE — 72050 X-RAY EXAM NECK SPINE 4/5VWS: CPT

## 2021-05-12 PROCEDURE — 73564 X-RAY EXAM KNEE 4 OR MORE: CPT

## 2022-05-02 ENCOUNTER — TELEPHONE (OUTPATIENT)
Dept: ORTHOPEDIC SURGERY | Age: 52
End: 2022-05-02

## 2023-01-30 ENCOUNTER — HOSPITAL ENCOUNTER (EMERGENCY)
Age: 53
Discharge: HOME | End: 2023-01-30
Attending: EMERGENCY MEDICINE
Payer: COMMERCIAL

## 2023-01-30 VITALS
RESPIRATION RATE: 16 BRPM | DIASTOLIC BLOOD PRESSURE: 107 MMHG | BODY MASS INDEX: 24.91 KG/M2 | HEIGHT: 70 IN | TEMPERATURE: 97.8 F | WEIGHT: 174 LBS | OXYGEN SATURATION: 100 % | HEART RATE: 59 BPM | SYSTOLIC BLOOD PRESSURE: 163 MMHG

## 2023-01-30 DIAGNOSIS — K04.7 PERIAPICAL ABSCESS: Primary | ICD-10-CM

## 2023-01-30 PROCEDURE — 99283 EMERGENCY DEPT VISIT LOW MDM: CPT

## 2023-01-30 RX ORDER — CHLORHEXIDINE GLUCONATE 1.2 MG/ML
15 RINSE ORAL EVERY 12 HOURS
Qty: 300 ML | Refills: 0 | Status: SHIPPED | OUTPATIENT
Start: 2023-01-30 | End: 2023-02-09

## 2023-01-30 RX ORDER — PENICILLIN V POTASSIUM 500 MG/1
500 TABLET, FILM COATED ORAL 2 TIMES DAILY
Qty: 20 TABLET | Refills: 0 | Status: SHIPPED | OUTPATIENT
Start: 2023-01-30 | End: 2023-02-09

## 2023-01-30 NOTE — ED PROVIDER NOTES
EMERGENCY DEPARTMENT HISTORY AND PHYSICAL EXAM      Date: 1/30/2023  Patient Name: Allie Schilling      History of Presenting Illness     Chief Complaint   Patient presents with    Dental Pain       Location/Duration/Severity/Modifying factors   Chief Complaint   Patient presents with    Dental Pain       HPI:  Allie Schilling is a 47 y.o. male with PMH presents with 4 days of constant pain, swelling to the left upper canine tooth, low-grade fever, no facial swelling, difficulty opening mouth, swallowing, voice change. Treatments accepted at home include none. Unable to see a dentist today. History of root canal to the painful tooth. PCP: Feliz Phillips MD    Current Outpatient Medications   Medication Sig Dispense Refill    penicillin v potassium (VEETID) 500 mg tablet Take 1 Tablet by mouth two (2) times a day for 10 days. 20 Tablet 0    chlorhexidine (PERIDEX) 0.12 % solution 15 mL by Swish and Spit route every twelve (12) hours for 10 days. 300 mL 0       Past History     Past Medical History:  Past Medical History:   Diagnosis Date    Ill-defined condition     ETOH       Past Surgical History:  No past surgical history on file. Family History:  History reviewed. No pertinent family history. Social History:  Social History     Tobacco Use    Smoking status: Former   Substance Use Topics    Alcohol use: Yes     Comment: 1/2 pint liqur    Drug use: No       Allergies:  No Known Allergies      Review of Systems     HEENT: Negative for rhinorrhea. Eyes: Negative for scleral injection, discharge  Pulm: Negative for cough, shortness of breath  GI: Negative for nausea, vomiting. Skin: Negative for rash, diaphoresis. MSK: Negative for obvious extremity injury or deformity  All systems negative except stated above in HPI      Physical Exam   General: Patient is awake and alert, resting comfortably in no acute distress  Head: Normocephalic and atraumatic.   No facial edema, palpable masses, facial asymmetry. Mouth: Localized fluctuant tender mass to the gingiva above left upper canine tooth, drainage of seropurulent material, tooth appears healthy, nonnecrotic appearance of the tooth or gingiva. No trismus noted. Eyes: Extraocular muscles intact, no conjunctival pallor  Cardiovascular: RRR, warm, well-perfused extremities  Respiratory: Patient is in no respiratory distress, normal respiratory effort. MSK: No gross deformities appreciated  Extremities: pulses intact with good cap refills, no LE pitting edema. Skin: Warm, dry, and intact  Neuro: The patient is alert and oriented, no gross motor defects noted. Psych: Appropriate mood and affect. Lab and Diagnostic Study Results     Labs -  No results found for this or any previous visit (from the past 24 hour(s)). Radiologic Studies -   No orders to display         Procedures and Critical Care       Performed by: Gilda Lozada, DO    Procedures         Gilda Lozada, DO    Medical Decision Making and ED Course   - I am the first and primary provider for this patient AND AM THE PRIMARY PROVIDER OF RECORD. - I reviewed the vital signs, available nursing notes, past medical history, past surgical history, family history and social history. - Initial assessment performed. The patients presenting problems have been discussed, and the staff are in agreement with the care plan formulated and outlined with them. I have encouraged them to ask questions as they arise throughout their visit. Vital Signs-Reviewed the patient's vital signs. Patient Vitals for the past 12 hrs:   Temp Pulse Resp BP SpO2   01/30/23 1136 97.8 °F (36.6 °C) (!) 59 16 (!) 163/107 100 %         Provider Notes (Medical Decision Making):  Initial differential diagnoses include periapical abscess, gingivitis, pulpitis. Clinical presentation most consistent with periapical abscess now status post spontaneous drainage with pressure to the area.  Critical diagnoses considered but unlikely include peritonsillar abscess, Ernesto's angina, peritonsillar abscess, facial abscess, ANUG. Objective studies obtained during this encounter and interpreted by me include none. Medications administered during this encounter: None. Treatment plan is 10-day course of penicillin VK, chlorhexidine oral solution. Follow up with local dentist. Pt comfortable with this plan. [facility]         ED Course:          ------------------------------------------------------------------------------------------------------------        Consultations:       Consultations: - NONE      Disposition         Discharged      Diagnosis     Clinical Impression:   1.  Periapical abscess        Attestations:    Devika Powell, DO  Emergency Physician   [employer]

## 2023-01-30 NOTE — ED TRIAGE NOTES
L upper tooth infected and painful x 4 days. No dentist/appt established. Also c/o lower back pain and bilat knee pain.

## 2023-03-10 ENCOUNTER — APPOINTMENT (OUTPATIENT)
Facility: HOSPITAL | Age: 53
DRG: 720 | End: 2023-03-10
Payer: COMMERCIAL

## 2023-03-10 ENCOUNTER — HOSPITAL ENCOUNTER (INPATIENT)
Facility: HOSPITAL | Age: 53
LOS: 3 days | Discharge: HOME | DRG: 720 | End: 2023-03-13
Attending: STUDENT IN AN ORGANIZED HEALTH CARE EDUCATION/TRAINING PROGRAM | Admitting: INTERNAL MEDICINE
Payer: COMMERCIAL

## 2023-03-10 ENCOUNTER — HOSPITAL ENCOUNTER (INPATIENT)
Facility: HOSPITAL | Age: 53
Discharge: HOME | DRG: 720 | End: 2023-03-13
Payer: COMMERCIAL

## 2023-03-10 DIAGNOSIS — T68.XXXA HYPOTHERMIA, INITIAL ENCOUNTER: ICD-10-CM

## 2023-03-10 DIAGNOSIS — R07.9 CHEST PAIN, UNSPECIFIED TYPE: Primary | ICD-10-CM

## 2023-03-10 DIAGNOSIS — E87.20 LACTIC ACIDOSIS: ICD-10-CM

## 2023-03-10 DIAGNOSIS — A41.9 SEPSIS WITHOUT ACUTE ORGAN DYSFUNCTION, DUE TO UNSPECIFIED ORGANISM (HCC): ICD-10-CM

## 2023-03-10 DIAGNOSIS — E87.6 HYPOKALEMIA: ICD-10-CM

## 2023-03-10 PROBLEM — R07.89 OTHER CHEST PAIN: Status: ACTIVE | Noted: 2023-03-10

## 2023-03-10 PROBLEM — R00.1 BRADYCARDIA: Status: ACTIVE | Noted: 2023-03-10

## 2023-03-10 PROBLEM — E16.2 HYPOGLYCEMIA: Status: ACTIVE | Noted: 2023-03-10

## 2023-03-10 PROBLEM — G93.40 ENCEPHALOPATHY ACUTE: Status: ACTIVE | Noted: 2023-03-10

## 2023-03-10 LAB
ALBUMIN SERPL-MCNC: 4.2 G/DL (ref 3.4–5)
ALBUMIN/GLOB SERPL: 1 (ref 0.8–1.7)
ALP SERPL-CCNC: 75 U/L (ref 45–117)
ALT SERPL-CCNC: 50 U/L (ref 16–61)
AMMONIA PLAS-SCNC: 48 UMOL/L (ref 11–32)
AMPHET UR QL SCN: NEGATIVE
ANION GAP SERPL CALC-SCNC: 12 MMOL/L (ref 3–18)
APAP SERPL-MCNC: <2 UG/ML (ref 10–30)
APPEARANCE UR: CLEAR
AST SERPL-CCNC: 136 U/L (ref 10–38)
B-OH-BUTYR SERPL-SCNC: 0.99 MMOL/L
BACTERIA URNS QL MICRO: ABNORMAL /HPF
BARBITURATES UR QL SCN: NEGATIVE
BASOPHILS # BLD: 0 K/UL (ref 0–0.1)
BASOPHILS NFR BLD: 0 % (ref 0–2)
BENZODIAZ UR QL: NEGATIVE
BILIRUB SERPL-MCNC: 0.9 MG/DL (ref 0.2–1)
BILIRUB UR QL: NEGATIVE
BUN SERPL-MCNC: 13 MG/DL (ref 7–18)
BUN/CREAT SERPL: 14 (ref 12–20)
CALCIUM SERPL-MCNC: 8.9 MG/DL (ref 8.5–10.1)
CANNABINOIDS UR QL SCN: POSITIVE
CHLORIDE SERPL-SCNC: 108 MMOL/L (ref 100–111)
CK SERPL-CCNC: 553 U/L (ref 39–308)
CO2 SERPL-SCNC: 17 MMOL/L (ref 21–32)
COCAINE UR QL SCN: NEGATIVE
COLOR UR: YELLOW
CREAT SERPL-MCNC: 0.94 MG/DL (ref 0.6–1.3)
DIFFERENTIAL METHOD BLD: ABNORMAL
EKG ATRIAL RATE: 41 BPM
EKG ATRIAL RATE: 50 BPM
EKG DIAGNOSIS: NORMAL
EKG DIAGNOSIS: NORMAL
EKG P AXIS: 69 DEGREES
EKG P AXIS: 71 DEGREES
EKG P-R INTERVAL: 146 MS
EKG P-R INTERVAL: 154 MS
EKG Q-T INTERVAL: 482 MS
EKG Q-T INTERVAL: 560 MS
EKG QRS DURATION: 104 MS
EKG QRS DURATION: 96 MS
EKG QTC CALCULATION (BAZETT): 439 MS
EKG QTC CALCULATION (BAZETT): 462 MS
EKG R AXIS: 66 DEGREES
EKG R AXIS: 76 DEGREES
EKG T AXIS: 51 DEGREES
EKG T AXIS: 56 DEGREES
EKG VENTRICULAR RATE: 41 BPM
EKG VENTRICULAR RATE: 50 BPM
EOSINOPHIL # BLD: 0.1 K/UL (ref 0–0.4)
EOSINOPHIL NFR BLD: 3 % (ref 0–5)
ERYTHROCYTE [DISTWIDTH] IN BLOOD BY AUTOMATED COUNT: 13.4 % (ref 11.6–14.5)
EST. AVERAGE GLUCOSE BLD GHB EST-MCNC: 85 MG/DL
ETHANOL SERPL-MCNC: 58 MG/DL (ref 0–3)
FERRITIN SERPL-MCNC: 239 NG/ML (ref 8–388)
FOLATE SERPL-MCNC: 14.9 NG/ML (ref 3.1–17.5)
GLOBULIN SER CALC-MCNC: 4.1 G/DL (ref 2–4)
GLUCOSE BLD STRIP.AUTO-MCNC: 154 MG/DL (ref 70–110)
GLUCOSE BLD STRIP.AUTO-MCNC: 159 MG/DL (ref 70–110)
GLUCOSE SERPL-MCNC: 118 MG/DL (ref 74–99)
GLUCOSE UR STRIP.AUTO-MCNC: 100 MG/DL
HBA1C MFR BLD: 4.6 % (ref 4.2–5.6)
HCT VFR BLD AUTO: 41.7 % (ref 36–48)
HGB BLD-MCNC: 13.5 G/DL (ref 13–16)
HGB UR QL STRIP: ABNORMAL
IMM GRANULOCYTES # BLD AUTO: 0 K/UL (ref 0–0.04)
IMM GRANULOCYTES NFR BLD AUTO: 0 % (ref 0–0.5)
IRON SATN MFR SERPL: 23 % (ref 20–50)
IRON SERPL-MCNC: 71 UG/DL (ref 50–175)
KETONES UR QL STRIP.AUTO: 15 MG/DL
LACTATE SERPL-SCNC: 2.6 MMOL/L (ref 0.4–2)
LACTATE SERPL-SCNC: 5.3 MMOL/L (ref 0.4–2)
LEUKOCYTE ESTERASE UR QL STRIP.AUTO: NEGATIVE
LYMPHOCYTES # BLD: 1 K/UL (ref 0.9–3.6)
LYMPHOCYTES NFR BLD: 21 % (ref 21–52)
Lab: ABNORMAL
MCH RBC QN AUTO: 34.4 PG (ref 24–34)
MCHC RBC AUTO-ENTMCNC: 32.4 G/DL (ref 31–37)
MCV RBC AUTO: 106.4 FL (ref 78–100)
METHADONE UR QL: NEGATIVE
MONOCYTES # BLD: 0.2 K/UL (ref 0.05–1.2)
MONOCYTES NFR BLD: 4 % (ref 3–10)
NEUTS SEG # BLD: 3.5 K/UL (ref 1.8–8)
NEUTS SEG NFR BLD: 73 % (ref 40–73)
NITRITE UR QL STRIP.AUTO: NEGATIVE
NRBC # BLD: 0 K/UL (ref 0–0.01)
NRBC BLD-RTO: 0 PER 100 WBC
OPIATES UR QL: NEGATIVE
PCP UR QL: NEGATIVE
PH UR STRIP: 6 (ref 5–8)
PLATELET # BLD AUTO: 180 K/UL (ref 135–420)
PMV BLD AUTO: 10.5 FL (ref 9.2–11.8)
POTASSIUM SERPL-SCNC: 3.1 MMOL/L (ref 3.5–5.5)
PROCALCITONIN SERPL-MCNC: <0.05 NG/ML
PROT SERPL-MCNC: 8.3 G/DL (ref 6.4–8.2)
PROT UR STRIP-MCNC: 30 MG/DL
RBC # BLD AUTO: 3.92 M/UL (ref 4.35–5.65)
RBC #/AREA URNS HPF: ABNORMAL /HPF (ref 0–5)
SALICYLATES SERPL-MCNC: 3.5 MG/DL (ref 2.8–20)
SODIUM SERPL-SCNC: 137 MMOL/L (ref 136–145)
SP GR UR REFRACTOMETRY: 1.01 (ref 1–1.03)
TIBC SERPL-MCNC: 306 UG/DL (ref 250–450)
TROPONIN I SERPL HS-MCNC: 10 NG/L (ref 0–78)
TROPONIN I SERPL HS-MCNC: 18 NG/L (ref 0–78)
TSH SERPL DL<=0.05 MIU/L-ACNC: 1.26 UIU/ML (ref 0.36–3.74)
UROBILINOGEN UR QL STRIP.AUTO: 0.2 EU/DL (ref 0.2–1)
VIT B12 SERPL-MCNC: 239 PG/ML (ref 211–911)
WBC # BLD AUTO: 4.8 K/UL (ref 4.6–13.2)
WBC URNS QL MICRO: ABNORMAL /HPF (ref 0–4)

## 2023-03-10 PROCEDURE — 83540 ASSAY OF IRON: CPT

## 2023-03-10 PROCEDURE — 82077 ASSAY SPEC XCP UR&BREATH IA: CPT

## 2023-03-10 PROCEDURE — 82607 VITAMIN B-12: CPT

## 2023-03-10 PROCEDURE — 99223 1ST HOSP IP/OBS HIGH 75: CPT | Performed by: INTERNAL MEDICINE

## 2023-03-10 PROCEDURE — 2580000003 HC RX 258

## 2023-03-10 PROCEDURE — 80307 DRUG TEST PRSMV CHEM ANLYZR: CPT

## 2023-03-10 PROCEDURE — 71045 X-RAY EXAM CHEST 1 VIEW: CPT

## 2023-03-10 PROCEDURE — 6370000000 HC RX 637 (ALT 250 FOR IP): Performed by: INTERNAL MEDICINE

## 2023-03-10 PROCEDURE — 96368 THER/DIAG CONCURRENT INF: CPT | Performed by: STUDENT IN AN ORGANIZED HEALTH CARE EDUCATION/TRAINING PROGRAM

## 2023-03-10 PROCEDURE — 83605 ASSAY OF LACTIC ACID: CPT

## 2023-03-10 PROCEDURE — 84206 ASSAY OF PROINSULIN: CPT

## 2023-03-10 PROCEDURE — 94761 N-INVAS EAR/PLS OXIMETRY MLT: CPT

## 2023-03-10 PROCEDURE — 70487 CT MAXILLOFACIAL W/DYE: CPT

## 2023-03-10 PROCEDURE — 2580000003 HC RX 258: Performed by: INTERNAL MEDICINE

## 2023-03-10 PROCEDURE — 6360000004 HC RX CONTRAST MEDICATION: Performed by: INTERNAL MEDICINE

## 2023-03-10 PROCEDURE — 83036 HEMOGLOBIN GLYCOSYLATED A1C: CPT

## 2023-03-10 PROCEDURE — 82962 GLUCOSE BLOOD TEST: CPT

## 2023-03-10 PROCEDURE — 6360000002 HC RX W HCPCS: Performed by: INTERNAL MEDICINE

## 2023-03-10 PROCEDURE — 82550 ASSAY OF CK (CPK): CPT

## 2023-03-10 PROCEDURE — 93010 ELECTROCARDIOGRAM REPORT: CPT | Performed by: INTERNAL MEDICINE

## 2023-03-10 PROCEDURE — 80143 DRUG ASSAY ACETAMINOPHEN: CPT

## 2023-03-10 PROCEDURE — 84443 ASSAY THYROID STIM HORMONE: CPT

## 2023-03-10 PROCEDURE — 81001 URINALYSIS AUTO W/SCOPE: CPT

## 2023-03-10 PROCEDURE — 82010 KETONE BODYS QUAN: CPT

## 2023-03-10 PROCEDURE — 99285 EMERGENCY DEPT VISIT HI MDM: CPT | Performed by: STUDENT IN AN ORGANIZED HEALTH CARE EDUCATION/TRAINING PROGRAM

## 2023-03-10 PROCEDURE — 6370000000 HC RX 637 (ALT 250 FOR IP)

## 2023-03-10 PROCEDURE — 80179 DRUG ASSAY SALICYLATE: CPT

## 2023-03-10 PROCEDURE — 96365 THER/PROPH/DIAG IV INF INIT: CPT | Performed by: STUDENT IN AN ORGANIZED HEALTH CARE EDUCATION/TRAINING PROGRAM

## 2023-03-10 PROCEDURE — 80053 COMPREHEN METABOLIC PANEL: CPT

## 2023-03-10 PROCEDURE — 93005 ELECTROCARDIOGRAM TRACING: CPT

## 2023-03-10 PROCEDURE — 80377 DRUG/SUBSTANCE NOS 7/MORE: CPT

## 2023-03-10 PROCEDURE — 82140 ASSAY OF AMMONIA: CPT

## 2023-03-10 PROCEDURE — 84681 ASSAY OF C-PEPTIDE: CPT

## 2023-03-10 PROCEDURE — 87040 BLOOD CULTURE FOR BACTERIA: CPT

## 2023-03-10 PROCEDURE — 84484 ASSAY OF TROPONIN QUANT: CPT

## 2023-03-10 PROCEDURE — 70450 CT HEAD/BRAIN W/O DYE: CPT

## 2023-03-10 PROCEDURE — 6360000002 HC RX W HCPCS

## 2023-03-10 PROCEDURE — 85025 COMPLETE CBC W/AUTO DIFF WBC: CPT

## 2023-03-10 PROCEDURE — 1100000000 HC RM PRIVATE

## 2023-03-10 PROCEDURE — 83525 ASSAY OF INSULIN: CPT

## 2023-03-10 PROCEDURE — 2500000003 HC RX 250 WO HCPCS: Performed by: INTERNAL MEDICINE

## 2023-03-10 PROCEDURE — 84145 PROCALCITONIN (PCT): CPT

## 2023-03-10 PROCEDURE — 71260 CT THORAX DX C+: CPT

## 2023-03-10 PROCEDURE — 82728 ASSAY OF FERRITIN: CPT

## 2023-03-10 PROCEDURE — 2700000000 HC OXYGEN THERAPY PER DAY

## 2023-03-10 RX ORDER — HYDRALAZINE HYDROCHLORIDE 25 MG/1
25 TABLET, FILM COATED ORAL EVERY 8 HOURS SCHEDULED
Status: DISCONTINUED | OUTPATIENT
Start: 2023-03-10 | End: 2023-03-13

## 2023-03-10 RX ORDER — LORAZEPAM 2 MG/ML
4 INJECTION INTRAMUSCULAR
Status: DISCONTINUED | OUTPATIENT
Start: 2023-03-10 | End: 2023-03-13

## 2023-03-10 RX ORDER — MULTIVITAMIN WITH IRON
1 TABLET ORAL DAILY
Status: DISCONTINUED | OUTPATIENT
Start: 2023-03-11 | End: 2023-03-13 | Stop reason: HOSPADM

## 2023-03-10 RX ORDER — ONDANSETRON 2 MG/ML
4 INJECTION INTRAMUSCULAR; INTRAVENOUS EVERY 6 HOURS PRN
Status: DISCONTINUED | OUTPATIENT
Start: 2023-03-10 | End: 2023-03-13 | Stop reason: HOSPADM

## 2023-03-10 RX ORDER — LORAZEPAM 1 MG/1
1 TABLET ORAL
Status: DISCONTINUED | OUTPATIENT
Start: 2023-03-10 | End: 2023-03-13 | Stop reason: HOSPADM

## 2023-03-10 RX ORDER — LORAZEPAM 2 MG/ML
3 INJECTION INTRAMUSCULAR
Status: DISCONTINUED | OUTPATIENT
Start: 2023-03-10 | End: 2023-03-13

## 2023-03-10 RX ORDER — ACETAMINOPHEN 325 MG/1
650 TABLET ORAL EVERY 6 HOURS PRN
COMMUNITY

## 2023-03-10 RX ORDER — SODIUM CHLORIDE 0.9 % (FLUSH) 0.9 %
5-40 SYRINGE (ML) INJECTION EVERY 12 HOURS SCHEDULED
Status: DISCONTINUED | OUTPATIENT
Start: 2023-03-10 | End: 2023-03-13 | Stop reason: HOSPADM

## 2023-03-10 RX ORDER — FAMOTIDINE 20 MG/1
20 TABLET, FILM COATED ORAL 2 TIMES DAILY
Status: DISCONTINUED | OUTPATIENT
Start: 2023-03-10 | End: 2023-03-13 | Stop reason: HOSPADM

## 2023-03-10 RX ORDER — AMLODIPINE BESYLATE 10 MG/1
10 TABLET ORAL NIGHTLY
Status: DISCONTINUED | OUTPATIENT
Start: 2023-03-10 | End: 2023-03-12

## 2023-03-10 RX ORDER — LORAZEPAM 1 MG/1
2 TABLET ORAL
Status: DISCONTINUED | OUTPATIENT
Start: 2023-03-10 | End: 2023-03-13 | Stop reason: HOSPADM

## 2023-03-10 RX ORDER — SODIUM CHLORIDE 0.9 % (FLUSH) 0.9 %
5-40 SYRINGE (ML) INJECTION PRN
Status: DISCONTINUED | OUTPATIENT
Start: 2023-03-10 | End: 2023-03-13 | Stop reason: HOSPADM

## 2023-03-10 RX ORDER — ACETAMINOPHEN 650 MG/1
650 SUPPOSITORY RECTAL EVERY 6 HOURS PRN
Status: DISCONTINUED | OUTPATIENT
Start: 2023-03-10 | End: 2023-03-13 | Stop reason: HOSPADM

## 2023-03-10 RX ORDER — ACETAMINOPHEN 325 MG/1
650 TABLET ORAL EVERY 6 HOURS PRN
Status: DISCONTINUED | OUTPATIENT
Start: 2023-03-10 | End: 2023-03-13 | Stop reason: HOSPADM

## 2023-03-10 RX ORDER — IBUPROFEN 200 MG
200 TABLET ORAL 2 TIMES DAILY PRN
COMMUNITY
End: 2023-09-02

## 2023-03-10 RX ORDER — LORAZEPAM 1 MG/1
4 TABLET ORAL
Status: DISCONTINUED | OUTPATIENT
Start: 2023-03-10 | End: 2023-03-13

## 2023-03-10 RX ORDER — POTASSIUM CHLORIDE 20 MEQ/1
40 TABLET, EXTENDED RELEASE ORAL ONCE
Status: COMPLETED | OUTPATIENT
Start: 2023-03-10 | End: 2023-03-10

## 2023-03-10 RX ORDER — SODIUM CHLORIDE 9 MG/ML
INJECTION, SOLUTION INTRAVENOUS PRN
Status: DISCONTINUED | OUTPATIENT
Start: 2023-03-10 | End: 2023-03-13 | Stop reason: HOSPADM

## 2023-03-10 RX ORDER — LORAZEPAM 2 MG/ML
1 INJECTION INTRAMUSCULAR
Status: DISCONTINUED | OUTPATIENT
Start: 2023-03-10 | End: 2023-03-13 | Stop reason: HOSPADM

## 2023-03-10 RX ORDER — AMLODIPINE BESYLATE 10 MG/1
10 TABLET ORAL DAILY
Status: DISCONTINUED | OUTPATIENT
Start: 2023-03-10 | End: 2023-03-10

## 2023-03-10 RX ORDER — SODIUM CHLORIDE, SODIUM LACTATE, POTASSIUM CHLORIDE, AND CALCIUM CHLORIDE .6; .31; .03; .02 G/100ML; G/100ML; G/100ML; G/100ML
30 INJECTION, SOLUTION INTRAVENOUS ONCE
Status: COMPLETED | OUTPATIENT
Start: 2023-03-10 | End: 2023-03-10

## 2023-03-10 RX ORDER — LACTULOSE 10 G/15ML
20 SOLUTION ORAL 3 TIMES DAILY
Status: COMPLETED | OUTPATIENT
Start: 2023-03-10 | End: 2023-03-11

## 2023-03-10 RX ORDER — ONDANSETRON 4 MG/1
4 TABLET, ORALLY DISINTEGRATING ORAL EVERY 8 HOURS PRN
Status: DISCONTINUED | OUTPATIENT
Start: 2023-03-10 | End: 2023-03-13 | Stop reason: HOSPADM

## 2023-03-10 RX ORDER — POLYETHYLENE GLYCOL 3350 17 G/17G
17 POWDER, FOR SOLUTION ORAL DAILY PRN
Status: DISCONTINUED | OUTPATIENT
Start: 2023-03-10 | End: 2023-03-13 | Stop reason: HOSPADM

## 2023-03-10 RX ORDER — LANOLIN ALCOHOL/MO/W.PET/CERES
100 CREAM (GRAM) TOPICAL DAILY
Status: DISCONTINUED | OUTPATIENT
Start: 2023-03-11 | End: 2023-03-13 | Stop reason: HOSPADM

## 2023-03-10 RX ORDER — LORAZEPAM 1 MG/1
3 TABLET ORAL
Status: DISCONTINUED | OUTPATIENT
Start: 2023-03-10 | End: 2023-03-13

## 2023-03-10 RX ORDER — LORAZEPAM 2 MG/ML
2 INJECTION INTRAMUSCULAR
Status: DISCONTINUED | OUTPATIENT
Start: 2023-03-10 | End: 2023-03-13

## 2023-03-10 RX ADMIN — SODIUM CHLORIDE, POTASSIUM CHLORIDE, SODIUM LACTATE AND CALCIUM CHLORIDE 1000 ML: 600; 310; 30; 20 INJECTION, SOLUTION INTRAVENOUS at 11:07

## 2023-03-10 RX ADMIN — HYDRALAZINE HYDROCHLORIDE 25 MG: 25 TABLET, FILM COATED ORAL at 21:48

## 2023-03-10 RX ADMIN — PIPERACILLIN AND TAZOBACTAM 4500 MG: 4; .5 INJECTION, POWDER, FOR SOLUTION INTRAVENOUS at 11:20

## 2023-03-10 RX ADMIN — HYDRALAZINE HYDROCHLORIDE 25 MG: 25 TABLET, FILM COATED ORAL at 15:34

## 2023-03-10 RX ADMIN — FAMOTIDINE 20 MG: 20 TABLET ORAL at 20:16

## 2023-03-10 RX ADMIN — AMLODIPINE BESYLATE 10 MG: 10 TABLET ORAL at 20:48

## 2023-03-10 RX ADMIN — PIPERACILLIN AND TAZOBACTAM 3375 MG: 3; .375 INJECTION, POWDER, FOR SOLUTION INTRAVENOUS at 20:17

## 2023-03-10 RX ADMIN — SODIUM CHLORIDE, PRESERVATIVE FREE 10 ML: 5 INJECTION INTRAVENOUS at 20:48

## 2023-03-10 RX ADMIN — LACTULOSE 20 G: 20 SOLUTION ORAL at 20:16

## 2023-03-10 RX ADMIN — THIAMINE HYDROCHLORIDE: 100 INJECTION, SOLUTION INTRAMUSCULAR; INTRAVENOUS at 13:29

## 2023-03-10 RX ADMIN — SODIUM CHLORIDE: 9 INJECTION, SOLUTION INTRAVENOUS at 20:21

## 2023-03-10 RX ADMIN — POTASSIUM CHLORIDE 40 MEQ: 1500 TABLET, EXTENDED RELEASE ORAL at 11:19

## 2023-03-10 RX ADMIN — IOPAMIDOL 100 ML: 612 INJECTION, SOLUTION INTRAVENOUS at 16:31

## 2023-03-10 RX ADMIN — VANCOMYCIN HYDROCHLORIDE 2000 MG: 10 INJECTION, POWDER, LYOPHILIZED, FOR SOLUTION INTRAVENOUS at 11:23

## 2023-03-10 RX ADMIN — LACTULOSE 20 G: 20 SOLUTION ORAL at 14:50

## 2023-03-10 RX ADMIN — FAMOTIDINE 20 MG: 20 TABLET ORAL at 12:56

## 2023-03-10 ASSESSMENT — PAIN - FUNCTIONAL ASSESSMENT: PAIN_FUNCTIONAL_ASSESSMENT: NONE - DENIES PAIN

## 2023-03-10 ASSESSMENT — ENCOUNTER SYMPTOMS
SHORTNESS OF BREATH: 0
GASTROINTESTINAL NEGATIVE: 1
EYES NEGATIVE: 1

## 2023-03-10 NOTE — ED NOTES
Pt able to ambulate to the bathroom without difficulty. Reported (+) BM.      Yoly Piña, RN  03/10/23 [id number]

## 2023-03-10 NOTE — ED PROVIDER NOTES
[address] EMERGENCY DEPT  EMERGENCY DEPARTMENT ENCOUNTER      Pt Name: Filiberto Bojorquez  MRN: [id number]  [place] 1970  Date of evaluation: 3/10/2023  Provider: Miguel Arana [facility]       Chief Complaint   Patient presents with    Altered Mental Status    Hypoglycemia         HISTORY OF PRESENT ILLNESS   (Location/Symptom, Timing/Onset, Context/Setting, Quality, Duration, Modifying Factors, Severity)  Note limiting factors. Patient is a 58-year-old male brought in via EMS for concerns of hypoglycemia and altered mental status. EMS reports that they were called out by the patient's girlfriend who reported the patient was \"sweaty\". Upon their arrival, patient was not responsive to pain or verbal stimuli. Their initial BGL was in the 50s and they report pinpoint pupils. They administered oral glucose and 2 mg of Narcan and the patient become responsive. Upon arrival, patient has no complaints except for being cold        Nursing Notes were reviewed. REVIEW OF SYSTEMS    (2-9 systems for level 4, 10 or more for level 5)     Review of Systems   Constitutional:  Positive for diaphoresis. \"Feels cold\"   HENT: Negative. Eyes: Negative. Respiratory:  Negative for shortness of breath. Cardiovascular:  Negative for chest pain. Gastrointestinal: Negative. Genitourinary: Negative. Neurological:  Negative for tremors, syncope, facial asymmetry and speech difficulty. Psychiatric/Behavioral:  Positive for confusion. Negative for hallucinations, self-injury and suicidal ideas. Except as noted above the remainder of the review of systems was reviewed and negative. PAST MEDICAL HISTORY     Past Medical History:   Diagnosis Date    Alcoholism ([facility] [address]     Ill-defined condition     ETOH    Marijuana smoker          SURGICAL HISTORY     No past surgical history on file.       CURRENT MEDICATIONS       Previous Medications    ACETAMINOPHEN (TYLENOL) 325 MG TABLET    Take 650 mg by mouth every 6 hours as needed for Pain or Fever       ALLERGIES     Patient has no known allergies. FAMILY HISTORY       Family History   Problem Relation Age of Onset    Diabetes type 2  Mother           SOCIAL HISTORY       Social History     Socioeconomic History    Marital status: Single   Tobacco Use    Smoking status: Former   Substance and Sexual Activity    Alcohol use: Yes    Drug use: Yes     Types: Marijuana (Weed)       SCREENINGS         Willard Coma Scale  Eye Opening: Spontaneous  Best Verbal Response: Oriented  Best Motor Response: Obeys commands  Apryl Coma Scale Score: 15                     CIWA Assessment  BP: (!) 154/89  Heart Rate: (!) 49                 PHYSICAL EXAM    (up to 7 for level 4, 8 or more for level 5)     ED Triage Vitals   BP Temp Temp src Pulse Resp SpO2 Height Weight   -- -- -- -- -- -- -- --       Physical Exam  Vitals and nursing note reviewed. Constitutional:       Comments: Thin appearing   HENT:      Head: Normocephalic and atraumatic. Eyes:      Extraocular Movements: Extraocular movements intact. Pupils: Pupils are equal, round, and reactive to light. Cardiovascular:      Rate and Rhythm: Normal rate. Pulmonary:      Effort: Pulmonary effort is normal.      Breath sounds: Normal breath sounds. Musculoskeletal:      Cervical back: Normal range of motion and neck supple. Skin:     Coloration: Skin is not cyanotic. Comments: cold   Neurological:      Mental Status: He is alert. Cranial Nerves: No dysarthria or facial asymmetry. Motor: No weakness.       Comments: Oriented to person place and year, not month   Psychiatric:         Mood and Affect: Mood normal.       DIAGNOSTIC RESULTS     EKG: All EKG's are interpreted by the Emergency Department Physician who either signs or Co-signs this chart in the absence of a cardiologist.    Sinus bradycardia with J waves     RADIOLOGY:   Non-plain film images such as CT, Ultrasound and MRI are read by the radiologist. Plain radiographic images are visualized and preliminarily interpreted by the emergency physician with the below findings:    Interpretation per the Radiologist below, if available at the time of this note:    CT Head W/O Contrast   Final Result      No acute intracranial findings. XR CHEST PORTABLE   Final Result   No acute cardiopulmonary process. ED BEDSIDE ULTRASOUND:   Performed by ED Physician - none    LABS:  Labs Reviewed   COMPREHENSIVE METABOLIC PANEL - Abnormal; Notable for the following components:       Result Value    Potassium 3.1 (*)     CO2 17 (*)     Glucose 118 (*)      (*)     Total Protein 8.3 (*)     Globulin 4.1 (*)     All other components within normal limits   CK - Abnormal; Notable for the following components:     Total  (*)     All other components within normal limits   LACTIC ACID - Abnormal; Notable for the following components:    Lactic Acid, Plasma 5.3 (*)     All other components within normal limits   URINE DRUG SCREEN - Abnormal; Notable for the following components:    THC, TH-Cannabinol, Urine Positive (*)     All other components within normal limits   URINALYSIS - Abnormal; Notable for the following components:    Protein, UA 30 (*)     Glucose,  (*)     Ketones, Urine 15 (*)     Blood, Urine TRACE (*)     All other components within normal limits   AMMONIA - Abnormal; Notable for the following components:    Ammonia 48 (*)     All other components within normal limits   URINALYSIS, MICRO - Abnormal; Notable for the following components:    BACTERIA, URINE FEW (*)     All other components within normal limits   CBC WITH AUTO DIFFERENTIAL - Abnormal; Notable for the following components:    RBC 3.92 (*)     .4 (*)     MCH 34.4 (*)     All other components within normal limits   ACETAMINOPHEN LEVEL - Abnormal; Notable for the following components:    Acetaminophen Level <2 (*)     All other components within normal limits   POCT GLUCOSE - Abnormal; Notable for the following components:    POC Glucose 154 (*)     All other components within normal limits   POCT GLUCOSE - Abnormal; Notable for the following components:    POC Glucose 159 (*)     All other components within normal limits   CULTURE, BLOOD 1   CULTURE, BLOOD 2   SALICYLATE LEVEL   TSH   ETHANOL   TROPONIN   TROPONIN   HEMOGLOBIN A1C   INSULIN, SERUM   PROINSULIN   C-PEPTIDE   SULFONYLUREA HYPOGLYCEMICS SERUM   BETA-HYDROXYBUTYRATE   LACTIC ACID   FERRITIN   VITAMIN B12 & FOLATE   IRON AND TIBC   PROCALCITONIN       All other labs were within normal range or not returned as of this dictation. EMERGENCY DEPARTMENT COURSE and DIFFERENTIAL DIAGNOSIS/MDM:   Vitals:    Vitals:    03/10/23 0845 03/10/23 0900 03/10/23 0915 03/10/23 1132   BP: (!) 183/97 (!) 172/97  (!) 154/89   Pulse: (!) 39 (!) 39 (!) 42 (!) 49   Resp: 11 11 (!) 9 14   Temp:  (!) 92 °F (33.3 °C)  97.8 °F (36.6 °C)   TempSrc:  Rectal     SpO2:  99% 100%    Weight:       Height:           Medical Decision Making  Soha Delvalle is a 81-year-old male who denies past medical history who presents via EMS for concerns of hypoglycemia and altered mental status. Upon EMS arrival, patient was diaphoretic and laying in bed unresponsive to painful or verbal stimuli. His initial BGL was in the 50s. EMS gave 125ml of D10 and his blood glucose improved to 175. He was still reportedly still lethargic, they administered 2 mg Narcan IV and he became more responsive. Upon arrival into the emergency department, patient is alert and oriented to person, place, year, but not month. He states he remembers going to work yesterday and then went to sleep, woke up this morning feeling diaphoretic. His initial rectal temperature was 89.9 °F, he was placed under a Keren hugger. EKG shows sinus bradycardia with J waves, consistent with hypothermia.   Plan to obtain labs to evaluate for causes of hypoglycemia and altered mental status. 10:40 Patient temperature has improved, but he remains hypothermic. Labs are notable for lactate of 5.3, normal white count, no signs of UTI or pneumonia on chest x-ray. Labs may be due to dehydration, but sepsis cannot be ruled out. Plan to treat with broad-spectrum antibiotics and IVF. Head CT shows no acute intracranial process. 11:15 Patients temperature has improved, however I think patient is more appropriate for admission, will consult hospitalist for admission due to presumed sepsis, lactic acidosis, and hypoglycemia. 11:50 Dr. Jonatan Monreal will accept patient    Amount and/or Complexity of Data Reviewed  Labs: ordered. Radiology: ordered. ECG/medicine tests: ordered. Risk  Prescription drug management. REASSESSMENT          CRITICAL CARE TIME   Total Critical Care time was 35 minutes, excluding separately reportable procedures. There was a high probability of clinically significant/life threatening deterioration in the patient's condition which required my urgent intervention. CONSULTS:  IP CONSULT TO SOCIAL WORK  IP CONSULT TO PHARMACY    PROCEDURES:  Unless otherwise noted below, none     FINAL IMPRESSION      1. Chest pain, unspecified type    2. Sepsis without acute organ dysfunction, due to unspecified organism ([address]    3. Hypothermia, initial encounter    4. Hypokalemia    5. Lactic acidosis          DISPOSITION/PLAN   DISPOSITION Admitted 03/10/2023 11:56:59 AM      PATIENT REFERRED TO:  No follow-up provider specified. DISCHARGE MEDICATIONS:  New Prescriptions    No medications on file     Controlled Substances Monitoring:     No flowsheet data found.     (Please note that portions of this note were completed with a voice recognition program.  Efforts were made to edit the dictations but occasionally words are mis-transcribed.)       Claudeen Lo, DO  Resident  03/10/23 1212

## 2023-03-10 NOTE — ED NOTES
1010 Taken to CT by transport. 1053 Back from CT. Placed back on monitor.      Rita Costa RN  03/10/23 1059

## 2023-03-10 NOTE — PROGRESS NOTES
[address] [facility] Pharmacokinetic Monitoring Service - Vancomycin     Clay Sepulveda is a 47 y.o. male starting on vancomycin therapy for Sepsis of Unknown Etiology. Pharmacy consulted for monitoring and adjustment. Target Concentration: Goal AUC/ASHLYE 400-600 mg*hr/L    Additional Antimicrobials: Vancomycin, Piperacillin/Tazobactam    Pertinent Laboratory Values:   Temp: 97.8 °F (36.6 °C), Weight: 171 lb (77.6 kg)  Recent Labs     03/10/23  0835 03/10/23  0909   CREATININE 0.94  --    BUN 13  --    WBC  --  4.8     Estimated Creatinine Clearance: 93 mL/min (based on SCr of 0.94 mg/dL). Pertinent Cultures:  Culture Date Source Results   3/10 Blood x2 ngtd   MRSA Nasal Swab: N/A.  Non-respiratory infection    Plan:  Dosing recommendations based on Bayesian software  Start vancomycin 2000 mg x 1 then 1000 mg q12  Anticipated AUC of 504 and trough concentration of 15.8 at steady state  Renal labs as indicated   Vancomycin concentration ordered for AM labs tomorrow  Pharmacy will continue to monitor patient and adjust therapy as indicated    Thank you for the consult,  Jamee Kearns, [facility]  3/10/2023

## 2023-03-10 NOTE — ED NOTES
Report given to CHLOÉ Bell of [facility]. Report consisted of SBAR, ED summary, MAR and recent results.      Forest Montoya RN  03/10/23 1600 No

## 2023-03-10 NOTE — CONSULTS
Infectious Disease Consultation Note        Reason:evaluate for sepsis    Current abx Prior abx   Zosyn, vancomycin since 3/10/23      Lines:       Assessment :  78-year-old man with past medical history significant for alcoholism, marijuana use, recent dental abscess presented to [facility] on 3/10/2023 with altered mental status. Recent visit at [facility] ED on 1/30/23 for left canine abscess- spontaneously drained. S/p PCN V x 10 days. Now with hypothermia, altered mentation, metabolic acidosis, elevated lactate, bradycardia    Clinical presentation may represent SIRS response to alcoholic ketoacidosis, ?undiagnosed seizure versus sepsis due to partially treated left canine abscess    Rule out subacute infective endocarditis (due to ongoing dental infection)    Recommendations:    Agree with Zosyn, vancomycin for now  Follow-up blood cultures  Obtain CT maxillofacial with contrast  Obtain transthoracic echo  We will follow-up about test results, clinical course to determine further plan of care    Thank you for consultation request. Above plan was discussed in details with patient,  and dr Buster Kim. Please call me if any further questions or concerns. Will continue to participate in the care of this patient. HPI:    78-year-old man with past medical history significant for alcoholism, marijuana use, recent dental abscess presented to [facility] on 3/10/2023 with altered mental status. He was brought to ED by EMS. EMS reports that they were called out by the patient's girlfriend who reported the patient was \"sweaty\". Upon their arrival, patient was not responsive to pain or verbal stimuli. Their initial BG was in the 50s and they reported pinpoint pupils. They administered oral glucose and 2 mg of Narcan and the patient become responsive. Upon arrival, patient had no complaints except for being cold. He was initially noted to be hypothermic, bradycardic. Hypothermia resolved quickly.   Patient's mentation is now back to baseline. He was started on broad-spectrum antibiotics due to concerns for sepsis. I have been consulted for further recommendations. Upon further questioning, patient states that he had abscess in his left upper tooth about a month ago. He came to [facility] emergency room for this. Review of records reveals that he was seen in [facility] emergency room on 1/13/2023 for purulent drainage, pain from left upper canine. He was diagnosed with periapical abscess. Prescribed 10 days of penicillin VK, chlorhexidine oral solution. Patient states that he has had intermittent pain in his tooth since then. He has not been evaluated by dentist.  Rolene Memo he had chest tightness, cough and felt drenched in sweat. He denies any chest pain, shortness of breath, abdominal pain, diarrhea, dysuria. He has chronic back pain which has not worsened recently. Past Medical History:   Diagnosis Date    Alcoholism ([address]     Ill-defined condition     ETOH    Marijuana smoker        No past surgical history on file.     @[facility]@    Current Facility-Administered Medications   Medication Dose Route Frequency    sodium chloride flush 0.9 % injection 5-40 mL  5-40 mL IntraVENous 2 times per day    sodium chloride flush 0.9 % injection 5-40 mL  5-40 mL IntraVENous PRN    0.9 % sodium chloride infusion   IntraVENous PRN    [START ON 3/11/2023] thiamine tablet 100 mg  100 mg Oral Daily    [START ON 3/11/2023] multivitamin 1 tablet  1 tablet Oral Daily    LORazepam (ATIVAN) tablet 1 mg  1 mg Oral Q1H PRN    Or    LORazepam (ATIVAN) injection 1 mg  1 mg IntraVENous Q1H PRN    Or    LORazepam (ATIVAN) tablet 2 mg  2 mg Oral Q1H PRN    Or    LORazepam (ATIVAN) injection 2 mg  2 mg IntraVENous Q1H PRN    Or    LORazepam (ATIVAN) tablet 3 mg  3 mg Oral Q1H PRN    Or    LORazepam (ATIVAN) injection 3 mg  3 mg IntraVENous Q1H PRN    Or    LORazepam (ATIVAN) tablet 4 mg  4 mg Oral Q1H PRN    Or    LORazepam (ATIVAN) injection 4 mg  4 mg IntraVENous Q1H PRN    famotidine (PEPCID) tablet 20 mg  20 mg Oral BID    piperacillin-tazobactam (ZOSYN) 3,375 mg in sodium chloride 0.9 % 100 mL extended IVPB (mini-bag)  3,375 mg IntraVENous Q8H    vancomycin (VANCOCIN) 1,000 mg in sodium chloride 0.9 % 250 mL (vial-mate) IVPB  1,000 mg IntraVENous Q12H    lactulose (CHRONULAC) 10 GM/15ML solution 20 g  20 g Oral TID     Current Outpatient Medications   Medication Sig    acetaminophen (TYLENOL) 325 MG tablet Take 650 mg by mouth every 6 hours as needed for Pain or Fever       Allergies: Patient has no known allergies.    Family History   Problem Relation Age of Onset    Diabetes type 2  Mother      Social History     Socioeconomic History    Marital status: Single     Spouse name: Not on file    Number of children: Not on file    Years of education: Not on file    Highest education level: Not on file   Occupational History    Not on file   Tobacco Use    Smoking status: Former    Smokeless tobacco: Not on file   Substance and Sexual Activity    Alcohol use: Yes    Drug use: Yes     Types: Marijuana (Weed)    Sexual activity: Not on file   Other Topics Concern    Not on file   Social History Narrative    Not on file     Social Determinants of Health     Financial Resource Strain: Not on file   Food Insecurity: Not on file   Transportation Needs: Not on file   Physical Activity: Not on file   Stress: Not on file   Social Connections: Not on file   Intimate Partner Violence: Not on file   Housing Stability: Not on file     Social History     Tobacco Use   Smoking Status Former   Smokeless Tobacco Not on file        Temp (24hrs), Av.3 °F (34.6 °C), Min:89.9 °F (32.2 °C), Max:97.8 °F (36.6 °C)    BP (!) 147/91   Pulse 51   Temp 97.5 °F (36.4 °C) (Oral)   Resp 16   Ht 5' 9\" (1.753 m)   Wt 171 lb (77.6 kg)   SpO2 99%   BMI 25.25 kg/m²     ROS: 12 point ROS obtained in details. Pertinent positives as mentioned in HPI,   otherwise negative    Physical  Exam:    General: Well developed, well nourished male laying on the bed AAOx3 in no acute distress. General:   awake alert and oriented   HEENT:  Normocephalic, atraumatic,EOMI, no scleral icterus or pallor; no conjunctival hemmohage;  nasal and oral mucous are moist and without evidence of lesions. No thrush. Dentition poor. Neck supple, no bruits. Lymph Nodes:   no cervical, axillary or inguinal adenopathy   Lungs:   non-labored, bilateral clear to auscultation- no crackles wheezes rales or rhonchi   Heart:  RRR, s1 and s2; no murmurs rubs or gallops, no edema, + pedal pulses   Abdomen:  soft, non-distended, active bowel sounds, no hepatomegaly, no splenomegaly. Non-tender   Genitourinary:  deferred   Extremities:   no clubbing, cyanosis; no joint effusions or swelling; Full ROM of all large joints to the upper and lower extremities; muscle mass appropriate for age   Neurologic:  No gross focal sensory abnormalities; 5/5 muscle strength to upper and lower extremities. Speech appropriate.  Cranial nerves intact                        Skin:  No rash or ulcers noted   Back:  Midthoracic spine tenderness, no  paraspinal muscle tenderness or rigidity, no CVA tenderness     Psychiatric:  No suicidal or homicidal ideations, appropriate mood and affect         Labs: Results:   Chemistry Recent Labs     03/10/23  0835   GLUCOSE 118*      K 3.1*      CO2 17*   BUN 13   CREATININE 0.94   GLOB 4.1*   ALT 50   *      CBC w/Diff Recent Labs     03/10/23  0909   WBC 4.8   RBC 3.92*   HGB 13.5   HCT 41.7         Microbiology Results       Procedure Component Value Units Date/Time    Blood Culture 2 [[id number]] Collected: 03/10/23 0951    Order Status: Sent Specimen: Blood Updated: 03/10/23 0957    Blood Culture 1 [[id number]] Collected: 03/10/23 0835    Order Status: Sent Specimen: Blood Updated: 03/10/23 0921                RADIOLOGY:    All available imaging studies/reports in [facility] for this admission were reviewed      Disclaimer: Sections of this note are dictated utilizing voice recognition software, which may have resulted in some phonetic based errors in grammar and contents. Even though attempts were made to correct all the mistakes, some may have been missed, and remained in the body of the document. If questions arise, please contact our department.    Dr. Jazzmine Delcid, Infectious Disease Specialist  [phone]  March 10, 2023  2:44 PM

## 2023-03-10 NOTE — H&P
History & Physical    Patient: Luis Alberto Rm MRN: [id number]  CSN: [id number]    YOB: 1970  Age: 47 y.o. Sex: male      DOA: 3/10/2023  CC: chest pain/tightness, cough, +drenched in sweat    PCP: Jennifer Jimenes MD       HPI:     Luis Alberto Rm is a 47 y.o. male with medical co-morbidities including alcoholism, marijuana smoking, recent dental abscess infection, presented who was brought to the ER due to feeling tightness of his chest and drenched in sweat. He reported that he woke this morning and felt confused and very cold. He noticed that his chest was pressure, he started to feel sweating all over. He asked his living partner to call for help. Otherwise he did not remember much that after. He reported that he was doing well last night. He typically drinks beers on a daily basis and smoke marijuana but this has never happened to him prior. Per EMS report, patient was found to be altered in his mentation, finger blood glucose was found 45. He was given D10 bolus and his blood sugar improved. He was given Narcan but this did not help with his symptoms. He was found to have a rectal temperature of 89.9 F. He was placed on [facility]. In the ED, he was found hypothermic. Vitals demonstrate bradycardia however his blood pressure was stabilized. CT of the head did not show any acute intracranial process. Chest x-ray did not show any acute cardiopulmonary process. EKG showed marked sinus bradycardia. CBC without any evidence of leukocytosis. BMP show potassium 3.1. Ammonia 48. CK5 5 3. Troponin 10  ALT 50. Lactic acid 5.3. Due to concern of sepsis he was started on IV Zosyn and vancomycin and IV fluid per sepsis protocol. Urine tox was positive for THC, ethanol level 58      Review of Systems  GENERAL: No fever, + chill, + malaise + feeling very cold  HEENT: No change in vision, no sore throat or sinus congestion. NECK: No pain or stiffness.    PULMONARY: No shortness of breath, no cough or wheeze. Cardiovascular: no pnd / orthopnea, no Chest Pain  GASTROINTESTINAL: No abd pain, No nausea/vomiting, No diarrhea, No melena or bright red blood per rectum. GENITOURINARY: No urinary frequency, No urgency or pain with urination. MUSCULOSKELETAL: No joint or muscle pain, no back pain, no recent trauma. DERMATOLOGIC: No rash, no itching, no lesions. ENDOCRINE: No polyuria, polydipsia, No recent change in weight. HEMATOLOGICAL: No easy bruising or bleeding. NEUROLOGIC: No headache, No seizures, + generalized weakness         Past Medical History:   Diagnosis Date    Alcoholism ([facility] [address]     Ill-defined condition     ETOH    Marijuana smoker        No past surgical history on file. He denies any surgical history    Family History   Problem Relation Age of Onset    Diabetes type 2  Mother        Social History     Socioeconomic History    Marital status: Single   Tobacco Use    Smoking status: Former   Substance and Sexual Activity    Alcohol use: Yes    Drug use: Yes     Types: Marijuana Paul Spina)       Prior to Admission medications    Medication Sig Start Date End Date Taking?  Authorizing Provider   acetaminophen (TYLENOL) 325 MG tablet Take 650 mg by mouth every 6 hours as needed for Pain or Fever   Yes Historical Provider, MD       No Known Allergies           Physical Exam:      Visit Vitals  BP (!) 154/89   Pulse (!) 49   Temp 97.8 °F (36.6 °C)   Resp 14   Ht 5' 9\" (1.753 m)   Wt 171 lb (77.6 kg)   SpO2 100%   BMI 25.25 kg/m²       Physical Exam:  Tele: Sinus bradycardia  General:  Cooperative, Not in acute distress, speaks in full sentence while in bed  HEENT: PERRL, EOMI, supple neck, no JVD, dry oral mucosa  Cardiovascular: S1S2 cardia, no rub/gallop   Pulmonary: + Air entry bilaterally, no wheezing, no crackle  GI:  Soft, non tender, non distended, +bs, no guarding   Extremities:  No pedal edema, +distal pulses appreciated   Neuro: AOx3, moving all extremities, no gross deficit. Lab/Data Review:  Labs: Results:       Chemistry Recent Labs     03/10/23  0835      K 3.1*      CO2 17*   BUN 13   GLOB 4.1*      CBC w/Diff Recent Labs     03/10/23  0909   WBC 4.8   RBC 3.92*   HGB 13.5   HCT 41.7         Coagulation No results for input(s): INR, APTT in the last 72 hours. Invalid input(s): PTP    Iron/Ferritin No results for input(s): IRON in the last 72 hours. Invalid input(s): TIBCCALC   BNP Invalid input(s): BNPP   Cardiac Enzymes No results for input(s): CPK, JACLYN in the last 72 hours. Invalid input(s): CKRMB, CKND1, TROIP   Liver Enzymes No results for input(s): TP, ALB in the last 72 hours. Invalid input(s): TBIL, AP, SGOT, GPT, DBIL   Thyroid Studies Lab Results   Component Value Date/Time    TSH 0.59 11/20/2019 03:33 AM          Results       Procedure Component Value Units Date/Time    Blood Culture 2 [[id number]] Collected: 03/10/23 0951    Order Status: Sent Specimen: Blood Updated: 03/10/23 0957    Blood Culture 1 [[id number]] Collected: 03/10/23 0835    Order Status: Sent Specimen: Blood Updated: 03/10/23 0921              Imaging Reviewed:  Jalyn Stubbs Result (most recent):  XR CHEST PORTABLE 03/10/2023    Narrative  EXAM:  XR CHEST PORTABLE    INDICATION:   ams    COMPARISON: Chest radiograph November 20, 2019. FINDINGS: No focal consolidation. No pneumothorax or pleural effusion. Cardiomediastinal silhouette and pulmonary vasculature are within normal limits. No acute osseous findings. Impression  No acute cardiopulmonary process. CT Result (most recent):  CT HEAD WO CONTRAST 03/10/2023    Narrative  CT OF THE HEAD WITHOUT CONTRAST. CLINICAL HISTORY: Altered mental status.     TECHNIQUE: Helical scan obtained of the head were obtained from the skull vertex  through the base of the skull without intravenous contrast.    All CT scans are  performed using dose optimization techniques as appropriate to the performed  exam including the following: Automated exposure control, adjustment of mA  and/or kV according to patient size, and use of iterative reconstructive  technique. COMPARISON: 7/25/2014. FINDINGS:    The sulcal pattern and ventricular system are normal in size and configuration  for age. No intracranial hemorrhage. No mass effect. The visualized paranasal  sinuses are clear. The mastoid air cells are clear. The visualized bony  structures are unremarkable. Impression  No acute intracranial findings. Assessment:   Principal Problem:    Sepsis ([address]    Active Problems:    Acute encephalopathy, likely metabolic in the setting of hypothermia, hypoglycemia, resolved  Hypothermia, potentially with unknown source of infection at this time. He essentially would have SIRS criteria, but no clear source of infection at this time. Hypoglycemia, symptomology prior to presentation. Now resolved. He has no history of diabetes. And he denied taking any hypoglycemic agent. Suspected symptoms related to alcoholism and alcohol withdrawal.  Bradycardia in the setting of hypothermia, this has slowly improved with a correction of his temperature. Hypertension, uncontrolled  Hypokalemia, corrected  Elevated CPK, does not look like rhabdomyolysis  Elevated ammonia, unclear if this contributed to his encephalopathy. He does not have underlying end-stage liver disease. Elevated AST, ALT likely in the setting of alcoholism  Alcoholism, with concern for alcohol withdrawal.  Marijuana smoking daily  Lactic acidosis, potentially due to underlying infection. Currently there is no evidence of hypoperfusion. Atypical chest pain on admission, negative troponin. Not ACS        Plan:     Admit to telemetry to continue close monitoring of his bradycardia and electrolyte derangement. He is currently continue on IV Zosyn and vancomycin for suspected sepsis of unclear source. Blood culture follow-up.   Will order CT chest abdomen and pelvic, order CT maxillofacial with IV contrast seen he recently had dental abscess. Currently there is no clear evidence of endocarditis. However due to his bradycardia, we will go ahead and order echocardiogram for further work-up.   We will consult ID for further recommendation  Continue with IV fluid for hydration  Continue with electrolyte replacement as needed  Add lactulose x3  Add hydralazine, amlodipine to goal systolic BP <727  Continue on CIWA protocol, give banana bag x1 today  Add Pepcid  Repeat CBC, BMP, ammonia, CPK tomorrow        Risk of deterioration:  []Low    [x]Moderate  []High     Prophylaxis:  []Lovenox  []Coumadin  []Hep SQ  [x]SCDs  [x]H2B/PPI     Disposition:  []Home w/ Family   [x]HH PT,OT,RN   []SNF/LTC   []SAH/Rehab     Discussed Code Status:         [x]Full Code      []DNR         ___________________________________________________     Care Plan discussed with:    [x]Patient   []Family    []ED Care Manager  [x]ED Doc   [x]Specialist :  Total Time Coordinating Admission:   75   minutes    []Total Critical Care Time:       Uli Edwards MD  3/10/2023, 12:08 PM

## 2023-03-10 NOTE — ED TRIAGE NOTES
Pt came from home, c/o AMS and hypoglycemia. Per EMS< they were called because pt was seen in his bed wet in sweat. Initial B and pt was confused. EMS gave 125ml of D10 via g.18 PIV at [facility]. BG was 175. Pt was still lethargic and given 2mg Narcan IV. Temp upon ED arrival: 89.9 rectally. Placed on reina hugger.

## 2023-03-11 ENCOUNTER — APPOINTMENT (OUTPATIENT)
Facility: HOSPITAL | Age: 53
DRG: 720 | End: 2023-03-11
Payer: COMMERCIAL

## 2023-03-11 LAB
AMMONIA PLAS-SCNC: 18 UMOL/L (ref 11–32)
ANION GAP SERPL CALC-SCNC: 5 MMOL/L (ref 3–18)
ANION GAP SERPL CALC-SCNC: 6 MMOL/L (ref 3–18)
BUN SERPL-MCNC: 5 MG/DL (ref 7–18)
BUN SERPL-MCNC: 8 MG/DL (ref 7–18)
BUN/CREAT SERPL: 5 (ref 12–20)
BUN/CREAT SERPL: 8 (ref 12–20)
C PEPTIDE SERPL-MCNC: 1.2 NG/ML (ref 1.1–4.4)
CALCIUM SERPL-MCNC: 8.8 MG/DL (ref 8.5–10.1)
CALCIUM SERPL-MCNC: 9 MG/DL (ref 8.5–10.1)
CHLORIDE SERPL-SCNC: 105 MMOL/L (ref 100–111)
CHLORIDE SERPL-SCNC: 107 MMOL/L (ref 100–111)
CK SERPL-CCNC: 450 U/L (ref 39–308)
CO2 SERPL-SCNC: 24 MMOL/L (ref 21–32)
CO2 SERPL-SCNC: 27 MMOL/L (ref 21–32)
CREAT SERPL-MCNC: 0.98 MG/DL (ref 0.6–1.3)
CREAT SERPL-MCNC: 0.98 MG/DL (ref 0.6–1.3)
ERYTHROCYTE [DISTWIDTH] IN BLOOD BY AUTOMATED COUNT: 13 % (ref 11.6–14.5)
GLUCOSE SERPL-MCNC: 73 MG/DL (ref 74–99)
GLUCOSE SERPL-MCNC: 79 MG/DL (ref 74–99)
HCT VFR BLD AUTO: 38.2 % (ref 36–48)
HGB BLD-MCNC: 13 G/DL (ref 13–16)
INSULIN SERPL-ACNC: 2.1 UIU/ML (ref 2.6–24.9)
MAGNESIUM SERPL-MCNC: 1.5 MG/DL (ref 1.6–2.6)
MCH RBC QN AUTO: 33.9 PG (ref 24–34)
MCHC RBC AUTO-ENTMCNC: 34 G/DL (ref 31–37)
MCV RBC AUTO: 99.7 FL (ref 78–100)
NRBC # BLD: 0 K/UL (ref 0–0.01)
NRBC BLD-RTO: 0 PER 100 WBC
PLATELET # BLD AUTO: 225 K/UL (ref 135–420)
PMV BLD AUTO: 11.1 FL (ref 9.2–11.8)
POTASSIUM SERPL-SCNC: 2.9 MMOL/L (ref 3.5–5.5)
POTASSIUM SERPL-SCNC: 3.4 MMOL/L (ref 3.5–5.5)
RBC # BLD AUTO: 3.83 M/UL (ref 4.35–5.65)
SODIUM SERPL-SCNC: 137 MMOL/L (ref 136–145)
SODIUM SERPL-SCNC: 137 MMOL/L (ref 136–145)
VANCOMYCIN SERPL-MCNC: 19.3 UG/ML (ref 5–40)
WBC # BLD AUTO: 6.6 K/UL (ref 4.6–13.2)

## 2023-03-11 PROCEDURE — 6370000000 HC RX 637 (ALT 250 FOR IP): Performed by: INTERNAL MEDICINE

## 2023-03-11 PROCEDURE — 1100000000 HC RM PRIVATE

## 2023-03-11 PROCEDURE — 80048 BASIC METABOLIC PNL TOTAL CA: CPT

## 2023-03-11 PROCEDURE — 83735 ASSAY OF MAGNESIUM: CPT

## 2023-03-11 PROCEDURE — 82140 ASSAY OF AMMONIA: CPT

## 2023-03-11 PROCEDURE — 2580000003 HC RX 258: Performed by: INTERNAL MEDICINE

## 2023-03-11 PROCEDURE — 80202 ASSAY OF VANCOMYCIN: CPT

## 2023-03-11 PROCEDURE — 82550 ASSAY OF CK (CPK): CPT

## 2023-03-11 PROCEDURE — 99232 SBSQ HOSP IP/OBS MODERATE 35: CPT | Performed by: INTERNAL MEDICINE

## 2023-03-11 PROCEDURE — 6360000002 HC RX W HCPCS: Performed by: INTERNAL MEDICINE

## 2023-03-11 PROCEDURE — 36415 COLL VENOUS BLD VENIPUNCTURE: CPT

## 2023-03-11 PROCEDURE — 85027 COMPLETE CBC AUTOMATED: CPT

## 2023-03-11 RX ORDER — AMOXICILLIN AND CLAVULANATE POTASSIUM 875; 125 MG/1; MG/1
1 TABLET, FILM COATED ORAL EVERY 12 HOURS SCHEDULED
Status: DISCONTINUED | OUTPATIENT
Start: 2023-03-11 | End: 2023-03-13 | Stop reason: HOSPADM

## 2023-03-11 RX ADMIN — AMLODIPINE BESYLATE 10 MG: 10 TABLET ORAL at 22:32

## 2023-03-11 RX ADMIN — HYDRALAZINE HYDROCHLORIDE 25 MG: 25 TABLET, FILM COATED ORAL at 05:55

## 2023-03-11 RX ADMIN — HYDRALAZINE HYDROCHLORIDE 25 MG: 25 TABLET, FILM COATED ORAL at 22:32

## 2023-03-11 RX ADMIN — PIPERACILLIN AND TAZOBACTAM 3375 MG: 3; .375 INJECTION, POWDER, FOR SOLUTION INTRAVENOUS at 03:35

## 2023-03-11 RX ADMIN — FAMOTIDINE 20 MG: 20 TABLET ORAL at 20:39

## 2023-03-11 RX ADMIN — AMOXICILLIN AND CLAVULANATE POTASSIUM 1 TABLET: 875; 125 TABLET, FILM COATED ORAL at 20:39

## 2023-03-11 RX ADMIN — SODIUM CHLORIDE, PRESERVATIVE FREE 10 ML: 5 INJECTION INTRAVENOUS at 09:11

## 2023-03-11 RX ADMIN — SODIUM CHLORIDE, PRESERVATIVE FREE 10 ML: 5 INJECTION INTRAVENOUS at 08:30

## 2023-03-11 RX ADMIN — HYDRALAZINE HYDROCHLORIDE 25 MG: 25 TABLET, FILM COATED ORAL at 13:19

## 2023-03-11 RX ADMIN — SODIUM CHLORIDE, PRESERVATIVE FREE 10 ML: 5 INJECTION INTRAVENOUS at 00:26

## 2023-03-11 RX ADMIN — LACTULOSE 20 G: 20 SOLUTION ORAL at 08:29

## 2023-03-11 RX ADMIN — VANCOMYCIN HYDROCHLORIDE 1000 MG: 1 INJECTION, POWDER, LYOPHILIZED, FOR SOLUTION INTRAVENOUS at 00:25

## 2023-03-11 RX ADMIN — Medication 100 MG: at 08:29

## 2023-03-11 RX ADMIN — VANCOMYCIN HYDROCHLORIDE 1000 MG: 1 INJECTION, POWDER, LYOPHILIZED, FOR SOLUTION INTRAVENOUS at 11:53

## 2023-03-11 RX ADMIN — SODIUM CHLORIDE, PRESERVATIVE FREE 10 ML: 5 INJECTION INTRAVENOUS at 20:39

## 2023-03-11 RX ADMIN — FAMOTIDINE 20 MG: 20 TABLET ORAL at 08:29

## 2023-03-11 RX ADMIN — POTASSIUM BICARBONATE 50 MEQ: 978 TABLET, EFFERVESCENT ORAL at 06:52

## 2023-03-11 RX ADMIN — POTASSIUM BICARBONATE 40 MEQ: 782 TABLET, EFFERVESCENT ORAL at 11:52

## 2023-03-11 RX ADMIN — THERA TABS 1 TABLET: TAB at 08:29

## 2023-03-11 RX ADMIN — PIPERACILLIN AND TAZOBACTAM 3375 MG: 3; .375 INJECTION, POWDER, FOR SOLUTION INTRAVENOUS at 11:52

## 2023-03-11 ASSESSMENT — PAIN SCALES - GENERAL
PAINLEVEL_OUTOF10: 0
PAINLEVEL_OUTOF10: 0

## 2023-03-11 NOTE — PROGRESS NOTES
conducted an initial consultation and Spiritual Assessment for Nelsy Llanos, who is a 47 y.o.,male. Patient's Primary Language is: [place]. According to the patient's EMR Hinduism Affiliation is: [place]. The reason the Patient came to the hospital is:   Patient Active Problem List    Diagnosis Date Noted    Sepsis ([facility] [address] 03/10/2023    Hypothermia 03/10/2023    Bradycardia 03/10/2023    Other chest pain 03/10/2023    Lactic acidosis 03/10/2023    Hypoglycemia 03/10/2023    Encephalopathy acute 03/10/2023        The  provided the following Interventions:  Initiated a relationship of care and support. Provided information about Spiritual Care Services. Chart reviewed. The following outcomes where achieved:  Patient expressed gratitude for 's visit. Assessment:  Patient does not have any Restoration/cultural needs that will affect patient's preferences in health care. Plan:  Chaplains will continue to follow and will provide pastoral care on an as needed/requested basis.  recommends bedside caregivers page  on duty if patient shows signs of acute spiritual or emotional distress.     [address]   [phone]

## 2023-03-11 NOTE — PROGRESS NOTES
[facility] Hospitalist Group  Progress Note    Patient: Jose Oglesby Age: 47 y.o. : 1970 MR#: [id number] SSN: [id number]  Date/Time: 3/11/2023    Subjective:     Pt states he has no pain. Denies dysuria, rash, joint pain, sob, cough. Seen walking in his room. Seen w/ nurse in room. Assessment/Plan:   47year old male w/ multiple medical conditions including recent dental abscess admitted after he presented to the ED w/ c/o chest tightness and diaphoresis, found w/ AMS and hypoglycemia, hypothermia by EMS, UDS +ve for THC, serum EtOH of 58    -Hypothermia: cause unclear, ?sepsis. TSH was normal.  Resolved  -Bradycardia: cause unclear, EKG--> sinus hebert w/o block, LVH, TSH normal. Resolved  -Elevated CK  -Hypokalemia, mag level not available  -Lactic acidosis: ?due to underlying infection  -Elevated blood pressure  -History of regular EtOH intake    Thus far no clear evidence of source of infection if present. CT maxillofacial w/ finding of apical abscesses thought to be unlikely cause systemic involvement. Pan CT scan did not show evidence of sepsis. Blood cx NGTD. Procal not c/w bacterial infection ? Viral infection? PLAN:  -stop vanc and zosyn, start augmentin to treat dental infection.  Advised patient to follow up w/ dentist  -Trend Ck, if increasing will start IVF  -Replace and follow K, check mag  -Cont CIWA, cont thiamine  -Trend temp, cont to monitor heart rate  -Follow echo  -Monitor blood pressure for now    Additional Notes:      Case discussed with:  [x]Patient  []Family  [x]Nursing  []Case Management  DVT Prophylaxis:  []Lovenox  []Hep SQ  []SCDs  []Coumadin   []On Heparin gtt    Objective:   VS: BP (!) 170/93   Pulse 71   Temp 99.3 °F (37.4 °C) (Oral)   Resp 18   Ht 5' 9\" (1.753 m)   Wt 171 lb (77.6 kg)   SpO2 98%   BMI 25.25 kg/m²    Tmax/24hrs: Temp (24hrs), Av.7 °F (37.1 °C), Min:98.1 °F (36.7 °C), Max:99.3 °F (37.4 °C)    Input/Output: Intake/Output Summary (Last 24 hours) at 3/11/2023 1449  Last data filed at 3/11/2023 1302  Gross per 24 hour   Intake 830 ml   Output 1075 ml   Net -245 ml       Genera: alert, awake, in NAD  HEENT: NCAT, sclerae anicteric,  mmm, neck supple  COR: rrr, no murmurs  PULM: CTAB  ABD: soft, nt, nd  EXT: no edema  NEURO: follows commands, no gross focal abnormalities, speech normal, no tremors  PSYCH: non agitated      Labs:    Recent Results (from the past 24 hour(s))   CBC    Collection Time: 03/11/23  4:42 AM   Result Value Ref Range    WBC 6.6 4.6 - 13.2 K/uL    RBC 3.83 (L) 4.35 - 5.65 M/uL    Hemoglobin 13.0 13.0 - 16.0 g/dL    Hematocrit 38.2 36.0 - 48.0 %    MCV 99.7 78.0 - 100.0 FL    MCH 33.9 24.0 - 34.0 PG    MCHC 34.0 31.0 - 37.0 g/dL    RDW 13.0 11.6 - 14.5 %    Platelets 539 365 - 137 K/uL    MPV 11.1 9.2 - 11.8 FL    Nucleated RBCs 0.0 0  WBC    nRBC 0.00 0.00 - 0.01 K/uL   Basic Metabolic Panel    Collection Time: 03/11/23  4:42 AM   Result Value Ref Range    Sodium 137 136 - 145 mmol/L    Potassium 2.9 (LL) 3.5 - 5.5 mmol/L    Chloride 105 100 - 111 mmol/L    CO2 27 21 - 32 mmol/L    Anion Gap 5 3.0 - 18 mmol/L    Glucose 73 (L) 74 - 99 mg/dL    BUN 8 7.0 - 18 MG/DL    Creatinine 0.98 0.6 - 1.3 MG/DL    Bun/Cre Ratio 8 (L) 12 - 20      Est, Glom Filt Rate >60 >60 ml/min/1.73m2    Calcium 9.0 8.5 - 10.1 MG/DL   Vancomycin Level, Random    Collection Time: 03/11/23  4:42 AM   Result Value Ref Range    Vancomycin Rm 19.3 5.0 - 40.0 UG/ML   Ammonia    Collection Time: 03/11/23  4:42 AM   Result Value Ref Range    Ammonia 18 11 - 32 UMOL/L     Additional Data Reviewed:      Signed By: Robles Newman MD     March 11, 2023 2:49 PM

## 2023-03-11 NOTE — PLAN OF CARE
Problem: ABCDS Injury Assessment  Goal: Absence of physical injury  Outcome: Progressing     Problem: Safety - Adult  Goal: Free from fall injury  Outcome: Progressing     Problem: Pain  Goal: Verbalizes/displays adequate comfort level or baseline comfort level  Outcome: Progressing     No untoward sign and symptoms noted

## 2023-03-11 NOTE — PROGRESS NOTES
This nurse spoke with Dr. Kandice Allison on the unit about tele monitor expiring. Dr. Kandice Allison gave a verbal to continue tele order for one more day and patient is able to go off unit without tele monitor. This nurse placed a verbal order for tele monitor for 24 hours.

## 2023-03-12 ENCOUNTER — APPOINTMENT (OUTPATIENT)
Facility: HOSPITAL | Age: 53
DRG: 720 | End: 2023-03-12
Payer: COMMERCIAL

## 2023-03-12 LAB
BASOPHILS # BLD: 0 K/UL (ref 0–0.1)
BASOPHILS NFR BLD: 1 % (ref 0–2)
CK SERPL-CCNC: 348 U/L (ref 39–308)
DIFFERENTIAL METHOD BLD: ABNORMAL
ECHO AO ASC DIAM: 2.8 CM
ECHO AO ASCENDING AORTA INDEX: 1.45 CM/M2
ECHO AO ROOT DIAM: 3.7 CM
ECHO AO ROOT INDEX: 1.92 CM/M2
ECHO AV AREA PEAK VELOCITY: 2.4 CM2
ECHO AV AREA VTI: 2.9 CM2
ECHO AV AREA/BSA PEAK VELOCITY: 1.2 CM2/M2
ECHO AV AREA/BSA VTI: 1.5 CM2/M2
ECHO AV MEAN GRADIENT: 5 MMHG
ECHO AV MEAN VELOCITY: 1 M/S
ECHO AV PEAK GRADIENT: 12 MMHG
ECHO AV PEAK VELOCITY: 1.7 M/S
ECHO AV VELOCITY RATIO: 0.59
ECHO AV VTI: 26.3 CM
ECHO BSA: 1.94 M2
ECHO LA VOL 2C: 50 ML (ref 18–58)
ECHO LA VOL 2C: 54 ML (ref 18–58)
ECHO LA VOL 4C: 51 ML (ref 18–58)
ECHO LA VOL 4C: 55 ML (ref 18–58)
ECHO LA VOLUME AREA LENGTH: 56 ML
ECHO LA VOLUME INDEX AREA LENGTH: 29 ML/M2 (ref 16–34)
ECHO LV E' LATERAL VELOCITY: 17 CM/S
ECHO LV E' SEPTAL VELOCITY: 10 CM/S
ECHO LV FRACTIONAL SHORTENING: 27 % (ref 28–44)
ECHO LV INTERNAL DIMENSION DIASTOLE INDEX: 2.54 CM/M2
ECHO LV INTERNAL DIMENSION DIASTOLIC: 4.9 CM (ref 4.2–5.9)
ECHO LV INTERNAL DIMENSION SYSTOLIC INDEX: 1.87 CM/M2
ECHO LV INTERNAL DIMENSION SYSTOLIC: 3.6 CM
ECHO LV IVSD: 1 CM (ref 0.6–1)
ECHO LV MASS 2D: 188.1 G (ref 88–224)
ECHO LV MASS INDEX 2D: 97.5 G/M2 (ref 49–115)
ECHO LV POSTERIOR WALL DIASTOLIC: 1.1 CM (ref 0.6–1)
ECHO LV RELATIVE WALL THICKNESS RATIO: 0.45
ECHO LVOT AREA: 4.2 CM2
ECHO LVOT AV VTI INDEX: 0.69
ECHO LVOT DIAM: 2.3 CM
ECHO LVOT MEAN GRADIENT: 2 MMHG
ECHO LVOT PEAK GRADIENT: 4 MMHG
ECHO LVOT PEAK VELOCITY: 1 M/S
ECHO LVOT STROKE VOLUME INDEX: 38.9 ML/M2
ECHO LVOT SV: 75.2 ML
ECHO LVOT VTI: 18.1 CM
ECHO MV A VELOCITY: 0.51 M/S
ECHO MV E DECELERATION TIME (DT): 249.1 MS
ECHO MV E VELOCITY: 0.71 M/S
ECHO MV E/A RATIO: 1.39
ECHO MV E/E' LATERAL: 4.18
ECHO MV E/E' RATIO (AVERAGED): 5.64
ECHO MV E/E' SEPTAL: 7.1
ECHO PV MAX VELOCITY: 1.3 M/S
ECHO PV PEAK GRADIENT: 7 MMHG
ECHO RA AREA 4C: 13.4 CM2
ECHO RV BASAL DIMENSION: 3.8 CM
ECHO RV FREE WALL PEAK S': 22 CM/S
ECHO RV TAPSE: 2.5 CM (ref 1.7–?)
EOSINOPHIL # BLD: 0.3 K/UL (ref 0–0.4)
EOSINOPHIL NFR BLD: 4 % (ref 0–5)
ERYTHROCYTE [DISTWIDTH] IN BLOOD BY AUTOMATED COUNT: 13 % (ref 11.6–14.5)
HCT VFR BLD AUTO: 36.3 % (ref 36–48)
HGB BLD-MCNC: 12.4 G/DL (ref 13–16)
IMM GRANULOCYTES # BLD AUTO: 0 K/UL (ref 0–0.04)
IMM GRANULOCYTES NFR BLD AUTO: 0 % (ref 0–0.5)
LV EF: 58 %
LVEF MODALITY: ABNORMAL
LYMPHOCYTES # BLD: 1.9 K/UL (ref 0.9–3.6)
LYMPHOCYTES NFR BLD: 32 % (ref 21–52)
MCH RBC QN AUTO: 34.4 PG (ref 24–34)
MCHC RBC AUTO-ENTMCNC: 34.2 G/DL (ref 31–37)
MCV RBC AUTO: 100.8 FL (ref 78–100)
MONOCYTES # BLD: 0.7 K/UL (ref 0.05–1.2)
MONOCYTES NFR BLD: 12 % (ref 3–10)
NEUTS SEG # BLD: 3 K/UL (ref 1.8–8)
NEUTS SEG NFR BLD: 51 % (ref 40–73)
NRBC # BLD: 0 K/UL (ref 0–0.01)
NRBC BLD-RTO: 0 PER 100 WBC
PLATELET # BLD AUTO: 191 K/UL (ref 135–420)
PMV BLD AUTO: 10.5 FL (ref 9.2–11.8)
RBC # BLD AUTO: 3.6 M/UL (ref 4.35–5.65)
WBC # BLD AUTO: 5.9 K/UL (ref 4.6–13.2)

## 2023-03-12 PROCEDURE — 36415 COLL VENOUS BLD VENIPUNCTURE: CPT

## 2023-03-12 PROCEDURE — 85025 COMPLETE CBC W/AUTO DIFF WBC: CPT

## 2023-03-12 PROCEDURE — 6370000000 HC RX 637 (ALT 250 FOR IP): Performed by: INTERNAL MEDICINE

## 2023-03-12 PROCEDURE — 1100000000 HC RM PRIVATE

## 2023-03-12 PROCEDURE — 93306 TTE W/DOPPLER COMPLETE: CPT | Performed by: INTERNAL MEDICINE

## 2023-03-12 PROCEDURE — 2580000003 HC RX 258: Performed by: INTERNAL MEDICINE

## 2023-03-12 PROCEDURE — 82550 ASSAY OF CK (CPK): CPT

## 2023-03-12 PROCEDURE — 93306 TTE W/DOPPLER COMPLETE: CPT

## 2023-03-12 RX ORDER — LISINOPRIL 10 MG/1
10 TABLET ORAL DAILY
Qty: 90 TABLET | Refills: 1 | Status: SHIPPED | OUTPATIENT
Start: 2023-03-12

## 2023-03-12 RX ORDER — AMOXICILLIN AND CLAVULANATE POTASSIUM 875; 125 MG/1; MG/1
1 TABLET, FILM COATED ORAL EVERY 12 HOURS SCHEDULED
Qty: 14 TABLET | Refills: 0 | Status: SHIPPED | OUTPATIENT
Start: 2023-03-12 | End: 2023-03-19

## 2023-03-12 RX ORDER — AMLODIPINE BESYLATE 10 MG/1
10 TABLET ORAL DAILY
Status: DISCONTINUED | OUTPATIENT
Start: 2023-03-12 | End: 2023-03-13 | Stop reason: HOSPADM

## 2023-03-12 RX ORDER — AMLODIPINE BESYLATE 10 MG/1
10 TABLET ORAL NIGHTLY
Qty: 30 TABLET | Refills: 3 | Status: SHIPPED | OUTPATIENT
Start: 2023-03-12

## 2023-03-12 RX ADMIN — Medication 100 MG: at 09:11

## 2023-03-12 RX ADMIN — HYDRALAZINE HYDROCHLORIDE 25 MG: 25 TABLET, FILM COATED ORAL at 21:28

## 2023-03-12 RX ADMIN — HYDRALAZINE HYDROCHLORIDE 25 MG: 25 TABLET, FILM COATED ORAL at 05:26

## 2023-03-12 RX ADMIN — SODIUM CHLORIDE, PRESERVATIVE FREE 10 ML: 5 INJECTION INTRAVENOUS at 09:00

## 2023-03-12 RX ADMIN — FAMOTIDINE 20 MG: 20 TABLET ORAL at 09:11

## 2023-03-12 RX ADMIN — SODIUM CHLORIDE, PRESERVATIVE FREE 10 ML: 5 INJECTION INTRAVENOUS at 21:29

## 2023-03-12 RX ADMIN — HYDRALAZINE HYDROCHLORIDE 25 MG: 25 TABLET, FILM COATED ORAL at 16:00

## 2023-03-12 RX ADMIN — THERA TABS 1 TABLET: TAB at 09:11

## 2023-03-12 RX ADMIN — AMLODIPINE BESYLATE 10 MG: 10 TABLET ORAL at 16:00

## 2023-03-12 RX ADMIN — AMOXICILLIN AND CLAVULANATE POTASSIUM 1 TABLET: 875; 125 TABLET, FILM COATED ORAL at 21:28

## 2023-03-12 RX ADMIN — AMOXICILLIN AND CLAVULANATE POTASSIUM 1 TABLET: 875; 125 TABLET, FILM COATED ORAL at 09:11

## 2023-03-12 RX ADMIN — FAMOTIDINE 20 MG: 20 TABLET ORAL at 21:28

## 2023-03-12 ASSESSMENT — PAIN SCALES - GENERAL
PAINLEVEL_OUTOF10: 0

## 2023-03-12 NOTE — PLAN OF CARE
Problem: ABCDS Injury Assessment  Goal: Absence of physical injury  3/12/2023 1251 by Tim Horton RN  Outcome: Progressing  3/12/2023 0021 by Tiffany Phillips RN  Outcome: Progressing     Problem: Safety - Adult  Goal: Free from fall injury  3/12/2023 1251 by Tim Horton RN  Outcome: Progressing  3/12/2023 0021 by Tiffany Phillips RN  Outcome: Progressing     Problem: Pain  Goal: Verbalizes/displays adequate comfort level or baseline comfort level  3/12/2023 1251 by Tim Horton RN  Outcome: Progressing  3/12/2023 0021 by Tiffany Phillips RN  Outcome: Progressing  Flowsheets (Taken 3/11/2023 1919)  Verbalizes/displays adequate comfort level or baseline comfort level:   Encourage patient to monitor pain and request assistance   Assess pain using appropriate pain scale   Administer analgesics based on type and severity of pain and evaluate response   Implement non-pharmacological measures as appropriate and evaluate response

## 2023-03-12 NOTE — PROGRESS NOTES
Had a few more BP's taken which are still elevated and hydralazine due at 2 pm. Called Dr. Marrero who will input BP med.

## 2023-03-12 NOTE — PLAN OF CARE
Problem: ABCDS Injury Assessment  Goal: Absence of physical injury  3/12/2023 0021 by Noni Rush RN  Outcome: Progressing  3/11/2023 1250 by Ramona Rebolledo RN  Outcome: Progressing     Problem: Safety - Adult  Goal: Free from fall injury  3/12/2023 0021 by Noni Rush RN  Outcome: Progressing  3/11/2023 1250 by Ramona Rebolledo RN  Outcome: Progressing     Problem: Pain  Goal: Verbalizes/displays adequate comfort level or baseline comfort level  3/12/2023 0021 by Noni Rush RN  Outcome: Progressing  Flowsheets (Taken 3/11/2023 1919)  Verbalizes/displays adequate comfort level or baseline comfort level:   Encourage patient to monitor pain and request assistance   Assess pain using appropriate pain scale   Administer analgesics based on type and severity of pain and evaluate response   Implement non-pharmacological measures as appropriate and evaluate response  3/11/2023 1250 by Ramona Rebolledo RN  Outcome: Progressing

## 2023-03-12 NOTE — PROGRESS NOTES
Patient is telling me that his girlfriend he has been staying with has just been incarcerated for drugs and he is not able to get into her house any longer. He has no where to go even though he is working full time. I called Marlyn Chairez in case mgmt and she is going to check into shelter and a ride in the morning. Patient is requesting melatonin. I called Dr. Emilia Choi and left a message requesting the medicine.

## 2023-03-13 VITALS
OXYGEN SATURATION: 99 % | SYSTOLIC BLOOD PRESSURE: 141 MMHG | HEART RATE: 96 BPM | BODY MASS INDEX: 25.33 KG/M2 | RESPIRATION RATE: 16 BRPM | HEIGHT: 69 IN | WEIGHT: 171 LBS | TEMPERATURE: 98.9 F | DIASTOLIC BLOOD PRESSURE: 98 MMHG

## 2023-03-13 LAB — PROINSULIN SERPL-SCNC: 6.2 PMOL/L (ref 0–10)

## 2023-03-13 PROCEDURE — 2580000003 HC RX 258: Performed by: INTERNAL MEDICINE

## 2023-03-13 PROCEDURE — 6370000000 HC RX 637 (ALT 250 FOR IP): Performed by: INTERNAL MEDICINE

## 2023-03-13 PROCEDURE — 99238 HOSP IP/OBS DSCHRG MGMT 30/<: CPT | Performed by: HOSPITALIST

## 2023-03-13 RX ORDER — HYDRALAZINE HYDROCHLORIDE 50 MG/1
50 TABLET, FILM COATED ORAL EVERY 8 HOURS SCHEDULED
Qty: 90 TABLET | Refills: 0 | Status: SHIPPED | OUTPATIENT
Start: 2023-03-13

## 2023-03-13 RX ORDER — HYDRALAZINE HYDROCHLORIDE 50 MG/1
50 TABLET, FILM COATED ORAL EVERY 8 HOURS SCHEDULED
Status: DISCONTINUED | OUTPATIENT
Start: 2023-03-13 | End: 2023-03-13 | Stop reason: HOSPADM

## 2023-03-13 RX ADMIN — HYDRALAZINE HYDROCHLORIDE 25 MG: 25 TABLET, FILM COATED ORAL at 06:41

## 2023-03-13 RX ADMIN — THERA TABS 1 TABLET: TAB at 09:26

## 2023-03-13 RX ADMIN — AMLODIPINE BESYLATE 10 MG: 10 TABLET ORAL at 09:26

## 2023-03-13 RX ADMIN — Medication 100 MG: at 09:26

## 2023-03-13 RX ADMIN — AMOXICILLIN AND CLAVULANATE POTASSIUM 1 TABLET: 875; 125 TABLET, FILM COATED ORAL at 09:26

## 2023-03-13 RX ADMIN — SODIUM CHLORIDE, PRESERVATIVE FREE 10 ML: 5 INJECTION INTRAVENOUS at 09:27

## 2023-03-13 RX ADMIN — FAMOTIDINE 20 MG: 20 TABLET ORAL at 09:26

## 2023-03-13 ASSESSMENT — PAIN SCALES - GENERAL
PAINLEVEL_OUTOF10: 0
PAINLEVEL_OUTOF10: 0

## 2023-03-13 NOTE — DISCHARGE SUMMARY
Discharge Summary    Patient: Filiberto Bojorquez               Sex: male          DOA: 3/10/2023         YOB: 1970      Age:  47 y.o.        LOS:  LOS: 3 days                Admit Date: 3/10/2023    Discharge Date: 3/13/2023    Admission Diagnoses: Hypokalemia [E87.6]  Lactic acidosis [E87.20]  Hypothermia, initial encounter [T68. XXXA]  Sepsis ([address] [A41.9]  Chest pain, unspecified type [R07.9]  Sepsis without acute organ dysfunction, due to unspecified organism [facility]) [A41.9]    Reason for Admission:    26-year-old man with past medical history significant for alcoholism, marijuana use, recent dental abscess presented to [facility] on 3/10/2023 with altered mental status. Discharge Condition:  good    Hospital Course:  -Hypothermia: cause unclear, ?sepsis. TSH was normal.  Resolved  -Bradycardia: cause unclear, EKG--> sinus hebert w/o block, LVH, TSH normal. Resolved  -Elevated CK, improving s/p iv fluids.   -Hypokalemia, repleted.   -Lactic acidosis, SIRS response to alcoholic ketoacidosis, dental periapical abscesses. Clinically improving.  -Elevated blood pressure  -History of regular EtOH intake  -Apical abscesses noted on CT maxillofacial 3/10/2023. Blood cultures (3/10/2023) no growth. Patient received vancomycin, Zosyn in house. Discharged with p.o. Augmentin, stop date 3/19/2023. Outpatient follow-up with dentistry.  -Full code. Patient is not homebound and no home health care needs were identified. Discharge to home. Patient agrees, all questions answered to the best my ability. Consults:    Treatment Team: Attending Provider: Cuate Walsh MD; Consulting Physician: Jett Arzola MD; Patient Care Tech: Damian Mahan CNA;  : Lakesha Sheikh RN    Procedures none    Significant Diagnostic Studies:   Lab Results   Component Value Date    WBC 5.9 03/12/2023    HGB 12.4 (L) 03/12/2023    HCT 36.3 03/12/2023    .8 (H) 03/12/2023     03/12/2023     Lab Results Component Value Date/Time     03/11/2023 04:30 PM    K 3.4 03/11/2023 04:30 PM     03/11/2023 04:30 PM    CO2 24 03/11/2023 04:30 PM    BUN 5 03/11/2023 04:30 PM    CREATININE 0.98 03/11/2023 04:30 PM    GLUCOSE 79 03/11/2023 04:30 PM    CALCIUM 8.8 03/11/2023 04:30 PM      Results       Procedure Component Value Units Date/Time    Blood Culture 2 [[id number]] Collected: 03/10/23 0915    Order Status: Completed Specimen: Blood Updated: 03/16/23 0702     Special Requests NO SPECIAL REQUESTS        Culture NO GROWTH 6 DAYS       Blood Culture 1 [[id number]] Collected: 03/10/23 0835    Order Status: Completed Specimen: Blood Updated: 03/16/23 0702     Special Requests NO SPECIAL REQUESTS        Culture NO GROWTH 6 DAYS             IMAGING  Xray Result (most recent):  XR CHEST PORTABLE 03/10/2023    Narrative  EXAM:  XR CHEST PORTABLE    INDICATION:   ams    COMPARISON: Chest radiograph November 20, 2019. FINDINGS: No focal consolidation. No pneumothorax or pleural effusion. Cardiomediastinal silhouette and pulmonary vasculature are within normal limits. No acute osseous findings. Impression  No acute cardiopulmonary process. CT Result (most recent):  CT CHEST ABDOMEN PELVIS W CONTRAST 03/10/2023    Narrative  CT CHEST ABDOMEN AND PELVIS WITH CONTRAST        COMPARISON:  No prior CT . INDICATIONS:    Sepsis. Following the uneventful administration of  oral contrast and  100 cc of Isovue  300 scanning of the chest, abdomen and pelvis is performed with a multislice  scanner. Coronal sagittal and axial reconstructions were created from the 3 D  data set. FINDINGS:    CT CHEST FINDINGS:    Thyroid/Base Of Neck: Normal.  Lungs: Clear. Pleural Spaces:Unremarkable. No mediastinal adenopathy or mass is evident. .  Cardiovascular structures unremarkable. CT ABDOMEN FINDINGS:  Liver/Biliary: Hepatic cyst. Otherwise negative.   Spleen: Normal.  Adrenal Glands: Normal.  Kidneys: Bilateral function without obstruction. Small lower pole intrarenal  calculus on the left. Right-sided simple appearing 5 cm cyst  Pancreas: Normal.  Stomach, Small Bowel, appendix, and Colon: Normal.  There is no significant adenopathy. The abdominal aorta is unremarkable. The IVC is unremarkable. Peritoneal Spaces: There is no free fluid or free air. Abdominal Wall: No hernia or mass is evident. Bladder: Unremarkable. Osseous Structures Of The Chest, Abdomen And Pelvis: No acute or aggressive  abnormalities evident. Impression  No occult septic process identified. .    All CT scans at this facility are performed using dose optimization technique as  appropriate to the performed exam, to include automated exposure control,  adjustment of the mA and/or kV according to patient's size (Including  appropriate matching for site-specific examinations), or use of iterative  reconstruction technique. Discharge Medications:     Current Discharge Medication List        START taking these medications    Details   hydrALAZINE (APRESOLINE) 50 MG tablet Take 1 tablet by mouth every 8 hours  Qty: 90 tablet, Refills: 0      amLODIPine (NORVASC) 10 MG tablet Take 1 tablet by mouth nightly  Qty: 30 tablet, Refills: 3      amoxicillin-clavulanate (AUGMENTIN) 875-125 MG per tablet Take 1 tablet by mouth every 12 hours for 7 days  Qty: 14 tablet, Refills: 0      lisinopril (PRINIVIL;ZESTRIL) 10 MG tablet Take 1 tablet by mouth daily  Qty: 90 tablet, Refills: 1           CONTINUE these medications which have NOT CHANGED    Details   acetaminophen (TYLENOL) 325 MG tablet Take 650 mg by mouth every 6 hours as needed for Pain or Fever      ibuprofen (ADVIL;MOTRIN) 200 MG tablet Take 200 mg by mouth 2 times daily as needed for Pain             Activity: activity as tolerated    Diet: regular diet    Wound Care: none needed    Follow-up:    Dr. Sophia Parry 3 days. Patient to schedule f/u with dentistry.

## 2023-03-13 NOTE — CONSULTS
Nutrition Education  (late note)    Educated on low Na diet  Learners: Patient  Readiness: Acceptance  Method: Explanation and Handout  Response: Verbalizes Understanding  Contact name and number provided. Pt seen this morning, prior to his discharge. Provided diet education on low Na diet per MD request due to pt having episodes of high blood pressure. Explained reason for pt to follow dietary consistencies. Discussed dietary considerations in regards to low Na diet; choosing foods when grocery shopping or dining out, that are low in Na/ salt. Discussed cooking/ meal preparation tips to aid in limiting Na intake. Encouraged pt to limit Na intake to 1500- 2000 mg daily. Pt verbalized understanding; denied having any questions retarding education provided. Pt reported good appetite/ meal intake; tolerating diet. No known food allergies. Pt did have concerns for post discharge; relayed patients report to  this morning.  Noted pt was recently discharged      Burl Homans, [address] [place] [address]  Contact Number: [phone]

## 2023-03-13 NOTE — PROGRESS NOTES
Physician Progress Note      Isidra Gregorio  CSN #:                  [id number]  :                       1970  ADMIT DATE:       3/10/2023 7:47 AM  DISCH DATE:        3/13/2023 2:11 PM  RESPONDING  PROVIDER #:        Conrado Oconnor MD          QUERY TEXT:    Patient admitted with acute metabolic encephalopathy and noted to have   hypothermia, bradycardia, hypoglycemia, lactic acidosis. If possible, please   document in progress notes and discharge summary if you are evaluating and/or   treating any of the following: The medical record reflects the following:  Risk Factors: alcoholism, marijuana smoking daily    Clinical Indicators:  3/10/2023, H&P, Dr. Li Burkett MD:  Redgie Eden encephalopathy, likely metabolic   in the setting of hypothermia, hypoglycemia, resolved. Hypothermia, potentially with unknown source of infection at this time. He   essentially would have SIRS criteria, but no clear source of infection at this   time. Hypoglycemia, symptomology prior to presentation. Bradycardia in the setting of hypothermia, this has slowly improved with a   correction of his temperature. Elevated CPK, does not look like rhabdomyolysis  Lactic acidosis, potentially due to underlying infection. Currently there is   no evidence of hypoperfusion. \"    Treatment: lactulose, IVF for hydration, echo    Thank you,  TANIA Edwards RN, CDS  [email or web address]  Options provided:  -- SIRS of non-infectious origin due to metabolic encephalopathy without acute   organ dysfunction  -- SIRS of non-infectious origin due to metabolic encephalopathy with acute   liver dysfunction  -- Other - I will add my own diagnosis  -- Disagree - Not applicable / Not valid  -- Disagree - Clinically unable to determine / Unknown  -- Refer to Clinical Documentation Reviewer    PROVIDER RESPONSE TEXT:    SIRS response to alcoholic ketoacidosis, ?undiagnosed seizure versus sepsis   due to partially treated left canine abscess    Query created by: Virgie Edwards on 3/13/2023 12:59 PM      Electronically signed by:  Margarita Becerra MD 3/13/2023 7:50 PM

## 2023-03-13 NOTE — PROGRESS NOTES
Pt states he cannot return to prior living arrangements. States that he cannot live or stay with family, even though his sister lives in the area. Shelter list provided and reviewed with pt. He is declining to go to a shelter because there isn't one near his job. Patient states that if he can get a bus ticket, he will contact his family and see if they can help him out. Bus ticket will be provided to pt.     [facility] RN CDCES  Case Management

## 2023-03-13 NOTE — PLAN OF CARE
Problem: ABCDS Injury Assessment  Goal: Absence of physical injury  3/13/2023 1155 by Jasmin Jaime RN  Outcome: Adequate for Discharge  3/13/2023 1155 by Jasmin Jaime RN  Outcome: Progressing     Problem: Safety - Adult  Goal: Free from fall injury  3/13/2023 1155 by Jasmin Jaime RN  Outcome: Adequate for Discharge  3/13/2023 1155 by Jasmin Jaime RN  Outcome: Progressing     Problem: Pain  Goal: Verbalizes/displays adequate comfort level or baseline comfort level  3/13/2023 1155 by Jasmin Jaime RN  Outcome: Adequate for Discharge  3/13/2023 1155 by Jasmin Jaime RN  Outcome: Progressing

## 2023-03-13 NOTE — CARE COORDINATION
03/13/23 1146   Service Assessment   Patient Orientation Alert and Oriented   Cognition Alert   History Provided By Patient   Primary Caregiver Manasa Rich Family Members   PCP Verified by CM Yes   Prior Functional Level Independent in ADLs/IADLs   Current Functional Level Independent in ADLs/IADLs   Can patient return to prior living arrangement No   Ability to make needs known: Good   Family able to assist with home care needs: No   Financial Resources Other (Comment)  ([facility])   CM/SW Referral Other (see comment)  (pt states he cannot go back to prior living arrangements and states he cannot stay with family. Shelter list provided and reviewed with pt. He is declining shelter because it is too far from his job.)   Social/Functional History   Type of [address]   Ambulation Assistance Independent   Transfer Assistance Independent   Active  No   Mode of Transportation Bus;Walk   Discharge [facility] Discharge   Mode of Transport at Discharge Other (see comment)  (pt provided bus ticket.)      Case Management Assessment  Initial Evaluation    Date/Time of Evaluation: 3/13/2023 11:53 AM  Assessment Completed by: Liliam Maxwell RN    If patient is discharged prior to next notation, then this note serves as note for discharge by case management. Patient Name: Lazarus Sequin                   YOB: 1970  Diagnosis: Hypokalemia [E87.6]  Lactic acidosis [E87.20]  Hypothermia, initial encounter [T68. XXXA]  Sepsis ([facility] [address] [A41.9]  Chest pain, unspecified type [R07.9]  Sepsis without acute organ dysfunction, due to unspecified organism [facility]) [A41.9]                   Date / Time: 3/10/2023  7:47 AM    Patient Admission Status: Inpatient   Readmission Risk (Low < 19, Mod (19-27), High > 27): Readmission Risk Score: 7.3    Current PCP: Tino Wen MD  PCP verified by CM? Yes    Chart Reviewed: Yes      History Provided by: Patient  Patient Orientation: Alert and Oriented    Patient Cognition: Alert    Hospitalization in the last 30 days (Readmission):  No    If yes, Readmission Assessment in  Navigator will be completed. Advance Directives:      Code Status: Full Code   Patient's Primary Decision Maker is:        Discharge Planning:    Patient lives with: (P) Alone Type of Home: Apartment  Primary Care Giver: Self  Patient Support Systems include: Family Members   Current Financial resources: (P) Other (Comment) ([facility])  Current community resources:    Current services prior to admission: None            Current DME:              Type of Home Care services:  None    ADLS  Prior functional level: (P) Independent in ADLs/IADLs  Current functional level: Independent in ADLs/IADLs    PT AM-PAC:   /24  OT AM-PAC:   /24    Family can provide assistance at DC: No  Would you like Case Management to discuss the discharge plan with any other family members/significant others, and if so, who? Plans to Return to Present Housing: No  Other Identified Issues/Barriers to RETURNING to current housing: yes. Potential Assistance needed at discharge: N/A            Potential DME:    Patient expects to discharge to: [address] [place] [address] for transportation at discharge:      Financial    Payor: [address] [place] [address] / Plan: [address] [place] [address] Road / Product Type: *No Product type* /     Does insurance require precert for SNF: yes     Potential assistance Purchasing Medications:    Meds-to-Beds request:        [address] [facility], [address] [place] [address] Rd [address]  Phone: [phone] Fax: [phone]      Notes:    Factors facilitating achievement of predicted outcomes:  Motivated, Cooperative, and Pleasant    Barriers to discharge: Limited family support and homelessness    Additional Case Management Notes: The Plan for Transition of Care is related to the following treatment goals of Hypokalemia [E87.6]  Lactic acidosis [E87.20]  Hypothermia, initial encounter [T68. XXXA]  Sepsis ([address] [A41.9]  Chest pain, unspecified type [R07.9]  Sepsis without acute organ dysfunction, due to unspecified organism ([address] [O31.9]    IF APPLICABLE: The Patient and/or patient representative Adalberto Paul and his family were provided with a choice of provider and agrees with the discharge plan. Freedom of choice list with basic dialogue that supports the patient's individualized plan of care/goals and shares the quality data associated with the providers was provided to:     Patient Representative Name:       The Patient and/or Patient Representative Agree with the Discharge Plan?       Linda Armijo RN  Case Management Department  Ph: [phone]

## 2023-03-13 NOTE — PROGRESS NOTES
Infectious Disease progress Note        Reason:evaluate for sepsis    Current abx Prior abx   Zosyn, vancomycin since 3/10/23      Lines:       Assessment :  52-year-old man with past medical history significant for alcoholism, marijuana use, recent dental abscess presented to [facility] on 3/10/2023 with altered mental status. Recent visit at [facility] ED on 1/30/23 for left canine abscess- spontaneously drained. S/p PCN V x 10 days. Now with hypothermia, altered mentation, metabolic acidosis, elevated lactate, bradycardia    Clinical presentation likely represents SIRS response to alcoholic ketoacidosis, ?undiagnosed seizures, dental periapical abscesses    Evidence of apical abscesses noted on CT maxillofacial 3/10/2023    Clinically improving. Blood cultures 3/10 no growth. Recommendations:    D/c Zosyn, vancomycin - start po augmentin till 3/19/23  2. Follow-up with dentist as outpatient  3. Discharge planning per primary team      Above plan was discussed in details with patient,  and primary team. Please call me if any further questions or concerns. Will continue to participate in the care of this patient. HPI:    Feels better. States that he does not have a place to go and will need homeless shelter  Past Medical History:   Diagnosis Date    Alcoholism ([facility] [address]     Ill-defined condition     ETOH    Marijuana smoker        No past surgical history on file.     @[email or web address]@    Current Facility-Administered Medications   Medication Dose Route Frequency    amLODIPine (NORVASC) tablet 10 mg  10 mg Oral Daily    amoxicillin-clavulanate (AUGMENTIN) 875-125 MG per tablet 1 tablet  1 tablet Oral 2 times per day    sodium chloride flush 0.9 % injection 5-40 mL  5-40 mL IntraVENous 2 times per day    sodium chloride flush 0.9 % injection 5-40 mL  5-40 mL IntraVENous PRN    0.9 % sodium chloride infusion   IntraVENous PRN    thiamine tablet 100 mg  100 mg Oral Daily    multivitamin 1 tablet  1 tablet Oral Daily LORazepam (ATIVAN) tablet 1 mg  1 mg Oral Q1H PRN    Or    LORazepam (ATIVAN) injection 1 mg  1 mg IntraVENous Q1H PRN    Or    LORazepam (ATIVAN) tablet 2 mg  2 mg Oral Q1H PRN    Or    LORazepam (ATIVAN) injection 2 mg  2 mg IntraVENous Q1H PRN    Or    LORazepam (ATIVAN) tablet 3 mg  3 mg Oral Q1H PRN    Or    LORazepam (ATIVAN) injection 3 mg  3 mg IntraVENous Q1H PRN    Or    LORazepam (ATIVAN) tablet 4 mg  4 mg Oral Q1H PRN    Or    LORazepam (ATIVAN) injection 4 mg  4 mg IntraVENous Q1H PRN    famotidine (PEPCID) tablet 20 mg  20 mg Oral BID    hydrALAZINE (APRESOLINE) tablet 25 mg  25 mg Oral 3 times per day    sodium chloride flush 0.9 % injection 5-40 mL  5-40 mL IntraVENous 2 times per day    sodium chloride flush 0.9 % injection 5-40 mL  5-40 mL IntraVENous PRN    0.9 % sodium chloride infusion   IntraVENous PRN    ondansetron (ZOFRAN-ODT) disintegrating tablet 4 mg  4 mg Oral Q8H PRN    Or    ondansetron (ZOFRAN) injection 4 mg  4 mg IntraVENous Q6H PRN    polyethylene glycol (GLYCOLAX) packet 17 g  17 g Oral Daily PRN    acetaminophen (TYLENOL) tablet 650 mg  650 mg Oral Q6H PRN    Or    acetaminophen (TYLENOL) suppository 650 mg  650 mg Rectal Q6H PRN       Allergies: Patient has no known allergies.     Family History   Problem Relation Age of Onset    Diabetes type 2  Mother      Social History     Socioeconomic History    Marital status: Single     Spouse name: Not on file    Number of children: Not on file    Years of education: Not on file    Highest education level: Not on file   Occupational History    Not on file   Tobacco Use    Smoking status: Former    Smokeless tobacco: Not on file   Substance and Sexual Activity    Alcohol use: Yes    Drug use: Yes     Types: Marijuana Aloma Santi)    Sexual activity: Not on file   Other Topics Concern    Not on file   Social History Narrative    Not on file     Social Determinants of Health     Financial Resource Strain: Not on file   Food Insecurity: Not on file   Transportation Needs: Not on file   Physical Activity: Not on file   Stress: Not on file   Social Connections: Not on file   Intimate Partner Violence: Not on file   Housing Stability: Not on file     Social History     Tobacco Use   Smoking Status Former   Smokeless Tobacco Not on file        Temp (24hrs), Av.5 °F (36.9 °C), Min:98.3 °F (36.8 °C), Max:98.7 °F (37.1 °C)    BP (!) 147/95   Pulse 79   Temp 98.5 °F (36.9 °C) (Oral)   Resp 18   Ht 5' 9\" (1.753 m)   Wt 171 lb (77.6 kg)   SpO2 98%   BMI 25.25 kg/m²     ROS: 12 point ROS obtained in details. Pertinent positives as mentioned in HPI,   otherwise negative    Physical Exam:    General: Well developed, well nourished male laying on the bed AAOx3 in no acute distress. General:   awake alert and oriented   HEENT:  Normocephalic, atraumatic,EOMI, no scleral icterus or pallor; no conjunctival hemmohage;  nasal and oral mucous are moist and without evidence of lesions. No thrush. Dentition poor. Neck supple, no bruits. Lymph Nodes:   no cervical, axillary or inguinal adenopathy   Lungs:   non-labored, bilateral clear to auscultation- no crackles wheezes rales or rhonchi   Heart:  RRR, s1 and s2; no murmurs rubs or gallops, no edema, + pedal pulses   Abdomen:  soft, non-distended, active bowel sounds, no hepatomegaly, no splenomegaly. Non-tender   Genitourinary:  deferred   Extremities:   no clubbing, cyanosis; no joint effusions or swelling; Full ROM of all large joints to the upper and lower extremities; muscle mass appropriate for age   Neurologic:  No gross focal sensory abnormalities; 5/5 muscle strength to upper and lower extremities. Speech appropriate.  Cranial nerves intact                        Skin:  No rash or ulcers noted   Back:  Midthoracic spine tenderness, no  paraspinal muscle tenderness or rigidity, no CVA tenderness     Psychiatric:  No suicidal or homicidal ideations, appropriate mood and affect         Labs: Results: Chemistry Recent Labs     03/10/23  0835 03/11/23 0442 03/11/23  1630   GLUCOSE 118* 73* 79    137 137   K 3.1* 2.9* 3.4*    105 107   CO2 17* 27 24   BUN 13 8 5*   CREATININE 0.94 0.98 0.98   GLOB 4.1*  --   --    ALT 50  --   --    *  --   --         CBC w/Diff Recent Labs     03/10/23  0909 03/11/23  0442 03/12/23  0105   WBC 4.8 6.6 5.9   RBC 3.92* 3.83* 3.60*   HGB 13.5 13.0 12.4*   HCT 41.7 38.2 36.3    225 191        Microbiology Results       Procedure Component Value Units Date/Time    Blood Culture 2 [[id number]] Collected: 03/10/23 0915    Order Status: Completed Specimen: Blood Updated: 03/12/23 0657     Special Requests NO SPECIAL REQUESTS        Culture NO GROWTH 2 DAYS       Blood Culture 1 [[id number]] Collected: 03/10/23 0835    Order Status: Completed Specimen: Blood Updated: 03/12/23 0657     Special Requests NO SPECIAL REQUESTS        Culture NO GROWTH 2 DAYS                   RADIOLOGY:    All available imaging studies/reports in [facility] for this admission were reviewed      Disclaimer: Sections of this note are dictated utilizing voice recognition software, which may have resulted in some phonetic based errors in grammar and contents. Even though attempts were made to correct all the mistakes, some may have been missed, and remained in the body of the document. If questions arise, please contact our department.     Dr. Corado [facility], Infectious Disease Specialist  [phone]  March 13, 2023  8:37 AM

## 2023-03-13 NOTE — PROGRESS NOTES
Bedside and Verbal shift change report given to [address] [place] [address] Avenue (oncoming nurse) by Sarah Lawton (offgoing nurse). Report included the following information Intake/Output, MAR, and Recent Results.

## 2023-03-13 NOTE — DISCHARGE INSTRUCTIONS
DISCHARGE SUMMARY from Nurse    PATIENT INSTRUCTIONS:    After general anesthesia or intravenous sedation, for 24 hours or while taking prescription Narcotics:  Limit your activities  Do not drive and operate hazardous machinery  Do not make important personal or business decisions  Do  not drink alcoholic beverages  If you have not urinated within 8 hours after discharge, please contact your surgeon on call. Report the following to your surgeon:  Excessive pain, swelling, redness or odor of or around the surgical area  Temperature over 100.5  Nausea and vomiting lasting longer than 4 hours or if unable to take medications  Any signs of decreased circulation or nerve impairment to extremity: change in color, persistent  numbness, tingling, coldness or increase pain  Any questions    What to do at Home:  Recommended activity: activity as tolerated, self care     If you experience any of the following symptoms  nausea, vomiting, diarrhea, shortness of breath, fever over 101, dizziness ,fainting, chest pain, change in mentation, or any other issues or  concerns,please follow up with primary care physician or dial 911 for emergencies. *  Please give a list of your current medications to your Primary Care Provider. *  Please update this list whenever your medications are discontinued, doses are      changed, or new medications (including over-the-counter products) are added. *  Please carry medication information at all times in case of emergency situations. These are general instructions for a healthy lifestyle:    No smoking/ No tobacco products/ Avoid exposure to second hand smoke  Surgeon General's Warning:  Quitting smoking now greatly reduces serious risk to your health.     Obesity, smoking, and sedentary lifestyle greatly increases your risk for illness    A healthy diet, regular physical exercise & weight monitoring are important for maintaining a healthy lifestyle    You may be retaining fluid if you have a history of heart failure or if you experience any of the following symptoms:  Weight gain of 3 pounds or more overnight or 5 pounds in a week, increased swelling in our hands or feet or shortness of breath while lying flat in bed. Please call your doctor as soon as you notice any of these symptoms; do not wait until your next office visit. The discharge information has been reviewed with the patient. The patient verbalized understanding. Discharge medications reviewed with the patient and appropriate educational materials and side effects teaching were provided. ___________________________________________________________________________________________________________________________________    Patient armband removed and shredded          Hypoglycemia: Care Instructions  Overview     Hypoglycemia means that your blood sugar is low and your body is not getting enough fuel. Some people get low blood sugar from not eating often enough. Some medicines to treat diabetes can cause low blood sugar. People who have had surgery on their stomachs or intestines may get hypoglycemia. Problems with the pancreas, kidneys, or liver also can cause low blood sugar. A snack or drink with sugar in it will raise your blood sugar and should ease your symptoms right away. Your doctor may recommend that you change or stop your medicines until you can get your blood sugar levels under control. In the long run, you may need to change your diet and eating habits so that you get enough fuel for your body throughout the day. Follow-up care is a key part of your treatment and safety. Be sure to make and go to all appointments, and call your doctor if you are having problems. It's also a good idea to know your test results and keep a list of the medicines you take. How can you care for yourself at home? Learn your signs of low blood sugar. They are different for everyone.  Some common early signs include:  Nausea. Hunger. Feeling nervous, irritable, or shaky. Cold, clammy skin. Sweating (when you're not exercising). Use the \"rule of 15\" to treat low blood sugar. This includes eating 15 grams of carbohydrate from a quick-sugar food, such as 3 or 4 glucose tablets or ½ cup of juice. Wait 15 minutes and check your blood sugar. If it is still below 70 mg/dL, eat another 15 grams of carbohydrate. Repeat this every 15 minutes until your blood sugar is in a safe target range. Once your blood sugar is in a safe range, eat a snack or meal to prevent recurrent low blood sugar. Make sure family, friends, and coworkers know the symptoms of low blood sugar and know how to get your sugar level up. If you were prescribed a glucagon kit, always have it with you. Make sure friends and family know how to use it. When should you call for help? Call 911 anytime you think you may need emergency care. For example, call if:    You passed out (lost consciousness). You are confused or cannot think clearly. Your blood sugar is very high or very low. Watch closely for changes in your health, and be sure to contact your doctor if:    Your blood sugar stays outside the level your doctor set for you. You have any problems. Where can you learn more? Go to http://www.woods.com/ and enter R955 to learn more about \"Hypoglycemia: Care Instructions. \"  Current as of: April 13, 2022               Content Version: 13.5  © 2006-2022 [facility]. Care instructions adapted under license by [facility] ([facility]). If you have questions about a medical condition or this instruction, always ask your healthcare professional. [place] [address] any warranty or liability for your use of this information. Sepsis: Care Instructions  Overview     Sepsis is a serious reaction to an infection. It causes inflammation across large areas of the body and can damage tissue and organs.  It can lead to extremely low blood pressure. Infections that can lead to sepsis include:  A skin infection such as from a cut. A lung infection like pneumonia. A urinary tract infection. A gut infection such as E. coli. Sepsis is treated with antibiotics. Your doctor will try to find the infection that led to sepsis. Karley Chairez also get fluids through a vein (IV). Machines will track your vital signs, including temperature, blood pressure, breathing rate, and pulse rate. The physical and mental effects of sepsis may not be seen for several weeks after treatment. And they may last long after the infection is gone. Physical problems may include:  Feeling weak and tired. Feeling out of breath. Aches and pains. Problems with getting around. Trouble falling asleep or staying asleep. Dry and itchy skin, brittle nails, and hair loss. Some of these effects can lead to problems with your organs or your feet, legs, hands, or arms. Sepsis can also affect your mind and emotions. Problems may include:  Self-doubt. Anxiety. Nightmares. Depression and mood problems. Wanting to avoid other people. Confusion. Flashbacks and bad memories of your illness. It's important to care for yourself and try to avoid infections. This may lower your risk of getting sepsis again. Follow-up care is a key part of your treatment and safety. Be sure to make and go to all appointments, and call your doctor if you are having problems. It's also a good idea to know your test results and keep a list of the medicines you take. How can you care for yourself at home? Be safe with medicines. Take your medicines exactly as prescribed. Call your doctor if you think you are having a problem with your medicine. If your doctor prescribed antibiotics, take them as directed. Do not stop taking them just because you feel better. You need to take the full course of antibiotics. Help prevent future infections.   Avoid infections such as COVID-19, colds, and the flu. Wash your hands often. Stay up to date on your COVID-19 vaccines. And get a flu vaccine every year. Ask your doctor if you need a pneumococcal vaccine (to prevent pneumonia, meningitis, and other infections). If you have had one before, ask your doctor if you need another dose. Clean any wounds or scrapes. Drink plenty of fluids to prevent dehydration. Eat a healthy diet. Include fruits, vegetables, and whole grains in your diet every day. If your doctor recommends it, try doing some physical activity. When should you call for help? Call 911  anytime you think you may need emergency care. For example, call if:    You passed out (lost consciousness). Call your doctor now or seek immediate medical care if:    You have symptoms such as:  Shortness of breath. Feeling very sick. Severe pain. A fast heart rate. Cool, pale, or clammy skin. Feeling confused. Feeling very sleepy, or you are hard to wake up. You are dizzy or lightheaded, or you feel like you may faint. You have a fever or chills. Watch closely for changes in your health, and be sure to contact your doctor if:    You do not get better as expected. Where can you learn more? Go to http://www.woods.com/ and enter T383 to learn more about \"Sepsis: Care Instructions. \"  Current as of: February 9, 2022               Content Version: 13.5  © 1026-9237 [employer]. Care instructions adapted under license by [facility] ([facility]). If you have questions about a medical condition or this instruction, always ask your healthcare professional. [place] [address] any warranty or liability for your use of this information. Alcohol Detoxification and Withdrawal: Care Instructions  Your Care Instructions     If you drink alcohol regularly and then suddenly stop, you may go through some physical and emotional problems while the alcohol clears out of your system.  Clearing the alcohol from your body is called detoxification, or detox. Physical and emotional problems that may happen during detox are called withdrawal.  Symptoms of withdrawal can be scary and dangerous. Mild symptoms include nausea and vomiting, sweating, shakiness, and intense worry. Severe symptoms include being confused and irritable, feeling things on your body that are not there, seeing or hearing things that are not there, and trembling. You may even have seizures. If your symptoms become severe you must see a doctor. People who drink large amounts of alcohol should not try to detox at home. A person can die of severe alcohol withdrawal.  Symptoms of alcohol withdrawal may begin from 4 to 12 hours after you stop drinking. But they may not start for several days after the last drink. They can last a few days. It is hard to stop drinking. But when you have cleared the alcohol from your system, you will be able to start the next part of your life, free from the burden of being dependent. Follow-up care is a key part of your treatment and safety. Be sure to make and go to all appointments, and call your doctor if you are having problems. It's also a good idea to know your test results and keep a list of the medicines you take. How can you care for yourself at home? Before you stop drinking, talk to your doctor about how you plan to stop. Be sure to be completely honest with the doctor about how much you have been drinking. Your doctor will figure out whether you need to detox in a supervised medical center. Take your medicines exactly as prescribed. Call your doctor if you think you are having a problem with your medicine. Make sure someone you trust is with you the whole time. Have friends and family members take turns staying with you until you are done with detox. Put a list of emergency numbers near the phone.  This should include your doctor, the police, the nearest hospital and emergency room, and neighbors who can help if needed. Make sure all alcohol is removed from the house before you start. This includes beverages as well as medicines, rubbing alcohol, and certain flavorings like vanilla extract. Keep \"drinking buddies\" away during the time you are going through detox. Make your surroundings calm. Soft lights, soft music, and a comfortable place to sit or lie down can help make the process easier. Drink lots of fluids and eat snacks such as fruit, cheese and crackers, and pretzels. Foods high in carbohydrate may help reduce the craving for alcohol. Understand that detox is going to be hard. Keep in mind that the people watching over you during detox are there to help. Explain to them before you start that you may not act like yourself until detox is finished. Consider joining a support group such as Alcoholics Anonymous. Sharing your experiences with other people who face similar challenges may help you feel less overwhelmed. Where to get help 24 hours a day, 7 days a week   If you or someone you know talks about suicide, self-harm, a mental health crisis, a substance use crisis, or any other kind of emotional distress, get help right away. You can:  Call the Suicide and Crisis Lifeline at 65. Call 3-063-754-TALK (4-983.162.7058). Text HOME to 063244 to access the Crisis Text Line. Consider saving these numbers in your phone. When should you call for help? Call 123 anytime you think you may need emergency care. For example, call if:    You feel you cannot stop from hurting yourself or someone else. You vomit many times and cannot stop. You vomit blood or what looks like coffee grounds. You have trouble breathing or are breathing very fast.     Your heart beats more than 120 times a minute and will not slow down. You have chest pain. You have a seizure. You see or feel things that are not there (hallucinate).    If you are caring for someone who is going through detox, call if:    The person passes out (loses consciousness). The person sees or feels things that are not there and sees or hears the same things many times. The person is very agitated and will not calm down. The person becomes violent or threatens to be violent. The person has a seizure. Call your doctor now or seek immediate medical care if:    You have a high fever. You have severe belly pain. You are very shaky. Watch closely for changes in your health, and be sure to contact your doctor if:    You do not get better as expected. Where can you learn more? Go to http://www.woods.com/ and enter D051 to learn more about \"Alcohol Detoxification and Withdrawal: Care Instructions. \"  Current as of: August 2, 2022               Content Version: 13.5  © 2006-2022 [employer]. Care instructions adapted under license by [facility] ([facility]). If you have questions about a medical condition or this instruction, always ask your healthcare professional. [place] [address] any warranty or liability for your use of this information. High Blood Pressure: Care Instructions  Overview     It's normal for blood pressure to go up and down throughout the day. But if it stays up, you have high blood pressure. Another name for high blood pressure is hypertension. Despite what a lot of people think, high blood pressure usually doesn't cause headaches or make you feel dizzy or lightheaded. It usually has no symptoms. But it does increase your risk of stroke, heart attack, and other problems. You and your doctor will talk about your risks of these problems based on your blood pressure. Your doctor will give you a goal for your blood pressure. Your goal will be based on your health and your age. Lifestyle changes, such as eating healthy and being active, are always important to help lower blood pressure.  You might also take medicine to reach your blood pressure goal.  Follow-up care is a key part of your treatment and safety. Be sure to make and go to all appointments, and call your doctor if you are having problems. It's also a good idea to know your test results and keep a list of the medicines you take. How can you care for yourself at home? Medical treatment  If you stop taking your medicine, your blood pressure will go back up. You may take one or more types of medicine to lower your blood pressure. Be safe with medicines. Take your medicine exactly as prescribed. Call your doctor if you think you are having a problem with your medicine. Talk to your doctor before you start taking aspirin every day. Aspirin can help certain people lower their risk of a heart attack or stroke. But taking aspirin isn't right for everyone, because it can cause serious bleeding. See your doctor regularly. You may need to see the doctor more often at first or until your blood pressure comes down. If you are taking blood pressure medicine, talk to your doctor before you take decongestants or anti-inflammatory medicine, such as ibuprofen. Some of these medicines can raise blood pressure. Learn how to check your blood pressure at home. Lifestyle changes  Stay at a healthy weight. This is especially important if you put on weight around the waist. Losing even 10 pounds can help you lower your blood pressure. If your doctor recommends it, get more exercise. Walking is a good choice. Bit by bit, increase the amount you walk every day. Try for at least 30 minutes on most days of the week. You also may want to swim, bike, or do other activities. Avoid or limit alcohol. Talk to your doctor about whether you can drink any alcohol. Try to limit how much sodium you eat to less than 2,300 milligrams (mg) a day. Your doctor may ask you to try to eat less than 1,500 mg a day. Eat plenty of fruits (such as bananas and oranges), vegetables, legumes, whole grains, and low-fat dairy products.   Lower the amount of saturated fat in your diet. Saturated fat is found in animal products such as milk, cheese, and meat. Limiting these foods may help you lose weight and also lower your risk for heart disease. Do not smoke. Smoking increases your risk for heart attack and stroke. If you need help quitting, talk to your doctor about stop-smoking programs and medicines. These can increase your chances of quitting for good. When should you call for help? Call 911  anytime you think you may need emergency care. This may mean having symptoms that suggest that your blood pressure is causing a serious heart or blood vessel problem. Your blood pressure may be over 180/120. For example, call 911 if:    You have symptoms of a heart attack. These may include:  Chest pain or pressure, or a strange feeling in the chest.  Sweating. Shortness of breath. Nausea or vomiting. Pain, pressure, or a strange feeling in the back, neck, jaw, or upper belly or in one or both shoulders or arms. Lightheadedness or sudden weakness. A fast or irregular heartbeat. You have symptoms of a stroke. These may include:  Sudden numbness, tingling, weakness, or loss of movement in your face, arm, or leg, especially on only one side of your body. Sudden vision changes. Sudden trouble speaking. Sudden confusion or trouble understanding simple statements. Sudden problems with walking or balance. A sudden, severe headache that is different from past headaches. You have severe back or belly pain. Do not wait until your blood pressure comes down on its own. Get help right away. Call your doctor now or seek immediate care if:    Your blood pressure is much higher than normal (such as 180/120 or higher), but you don't have symptoms. You think high blood pressure is causing symptoms, such as:  Severe headache. Blurry vision.    Watch closely for changes in your health, and be sure to contact your doctor if:    Your blood pressure measures higher than your doctor recommends at least 2 times. That means the top number is higher or the bottom number is higher, or both. You think you may be having side effects from your blood pressure medicine. Where can you learn more? Go to http://www.ford.com/ and enter P872 to learn more about \"High Blood Pressure: Care Instructions. \"  Current as of: October 6, 2021               Content Version: 13.5  © 2006-2022 [employer]. Care instructions adapted under license by [address] [place] [address]. If you have questions about a medical condition or this instruction, always ask your healthcare professional. [place] [address] any warranty or liability for your use of this information. amlodipine  Pronunciation:  aurelia balderas  Brand:  [employer]ur  What is the most important information I should know about amlodipine? Follow all directions on your medicine label and package. Tell each of your healthcare providers about all your medical conditions, allergies, and all medicines you use. What is amlodipine? Amlodipine is a calcium channel blocker that dilates (widens) blood vessels and improves blood flow. Amlodipine is used to treat chest pain (angina) and other conditions caused by coronary artery disease. Amlodipine is also used to treat high blood pressure (hypertension) in adults and children at least 10years old. Lowering blood pressure may lower your risk of a stroke or heart attack. Amlodipine may also be used for purposes not listed in this medication guide. What should I discuss with my healthcare provider before taking amlodipine? You should not take amlodipine if you are allergic to it. Tell your doctor if you have ever had:  liver disease; or  a heart valve problem called aortic stenosis. Tell your doctor if you are pregnant or plan to become pregnant. It is not known whether amlodipine will harm an unborn baby.  However, having high blood pressure during pregnancy may cause complications such as diabetes or eclampsia (dangerously high blood pressure that can lead to medical problems in both mother and baby). The benefit of treating hypertension may outweigh any risks to the baby. Tell your doctor if you are breastfeeding. How should I take amlodipine? Follow all directions on your prescription label and read all medication guides or instruction sheets. Your doctor may occasionally change your dose. Use the medicine exactly as directed. Take the medicine at the same time each day, with or without food. Shake the oral suspension (liquid) before you measure a dose. Use the dosing syringe provided, or use a medicine dose-measuring device (not a kitchen spoon). Your blood pressure will need to be checked often. Your chest pain may become worse when you first start taking amlodipine or when your dose is increased. Call your doctor if your chest pain is severe or ongoing. If you are being treated for high blood pressure, keep using amlodipine even if you feel well. High blood pressure often has no symptoms. You may need to use blood pressure medicine for the rest of your life. Your hypertension or heart condition may be treated with a combination of drugs. Use all medications as directed and read all medication guides you receive. Do not change your doses or stop taking any of your medications without your doctor's advice. This is especially important if you also take nitroglycerin. Amlodipine is only part of a complete program of treatment that may also include diet, exercise, weight control, and other medications. Follow your diet, medication, and exercise routines very closely. Store at room temperature away from moisture, heat, and light. What happens if I miss a dose? Take the medicine as soon as you can, but skip the missed dose if you are more than 12 hours late for the dose. Do not take two doses at one time. What happens if I overdose?   Seek emergency medical attention or call the Poison Help line at 1-819.498.2707. Overdose symptoms may include rapid heartbeats, redness or warmth in your arms or legs, or fainting. What should I avoid while taking amlodipine? Avoid getting up too fast from a sitting or lying position, or you may feel dizzy. What are the possible side effects of amlodipine? Get emergency medical help if you have signs of an allergic reaction:  hives; difficulty breathing; swelling of your face, lips, tongue, or throat. In rare cases, when you first start taking amlodipine, your angina may get worse or you could have a heart attack. Seek emergency medical attention or call your doctor right away if you have symptoms such as: chest pain or pressure, pain spreading to your jaw or shoulder, nausea, sweating. Call your doctor at once if you have:  pounding heartbeats or fluttering in your chest;  worsening chest pain;  swelling in your feet or ankles;  severe drowsiness; or  a light-headed feeling, like you might pass out. Common side effects may include:  dizziness, drowsiness;  feeling tired;  stomach pain, nausea; or  flushing (warmth, redness, or tingly feeling). This is not a complete list of side effects and others may occur. Call your doctor for medical advice about side effects. You may report side effects to FDA at 8-628-YFE-7207. What other drugs will affect amlodipine? Tell your doctor about all your other medicines, especially:  nitroglycerin;  simvastatin (Zocor, Simcor, Vytorin); or  any other heart or blood pressure medications. This list is not complete. Other drugs may affect amlodipine, including prescription and over-the-counter medicines, vitamins, and herbal products. Not all possible drug interactions are listed here. Where can I get more information? Your pharmacist can provide more information about amlodipine.   Remember, keep this and all other medicines out of the reach of children, never share your medicines with others, and use this medication only for the indication prescribed. Every effort has been made to ensure that the information provided by [address] is accurate, up-to-date, and complete, but no guarantee is made to that effect. Drug information contained herein may be time sensitive. [facility] information has been compiled for use by healthcare practitioners and consumers in the [facility] and therefore [facility] does not warrant that uses outside of the [place] are appropriate, unless specifically indicated otherwise. [facility]'s drug information does not endorse drugs, diagnose patients or recommend therapy. [facility]'s drug information is an informational resource designed to assist licensed healthcare practitioners in caring for their patients and/or to serve consumers viewing this service as a supplement to, and not a substitute for, the expertise, skill, knowledge and judgment of healthcare practitioners. The absence of a warning for a given drug or drug combination in no way should be construed to indicate that the drug or drug combination is safe, effective or appropriate for any given patient. [facility] does not assume any responsibility for any aspect of healthcare administered with the aid of information [facility] provides. The information contained herein is not intended to cover all possible uses, directions, precautions, warnings, drug interactions, allergic reactions, or adverse effects. If you have questions about the drugs you are taking, check with your doctor, nurse or pharmacist.  Copyright 9870-3794 [address]: 15.01. Revision date: 10/28/2019. Care instructions adapted under license by [facility] ([facility]). If you have questions about a medical condition or this instruction, always ask your healthcare professional. [place] [address] any warranty or liability for your use of this information.          amoxicillin and clavulanate potassium  Pronunciation: am OK i MARCIANO in [place] [address] MELI ate apple TAS ee um  Brand:  Augmentin  What is the most important information I should know about amoxicillin and clavulanate potassium? You should not use this medicine if you have severe kidney disease, if you have had liver problems or jaundice while taking amoxicillin and clavulanate potassium, or if you are allergic to any penicillin or cephalosporin antibiotic, such as Amoxil, Ceftin, Cefzil, Moxatag, Omnicef, and others. What is amoxicillin and clavulanate potassium? Amoxicillin is a penicillin antibiotic. Clavulanate potassium helps prevent certain bacteria from becoming resistant to amoxicillin. Amoxicillin and clavulanate potassium is a combination medicine used to treat many different infections caused by bacteria, such as sinusitis, pneumonia, ear infections, bronchitis, urinary tract infections, and infections of the skin. Amoxicillin and clavulanate potassium may also be used for purposes not listed in this medication guide. What should I discuss with my healthcare provider before taking amoxicillin and clavulanate potassium? You should not use this medicine if you are allergic to it, or if:  you have severe kidney disease (or if you are on dialysis);  you have had liver problems or jaundice while taking amoxicillin and clavulanate potassium; or  you are allergic to any penicillin or cephalosporin antibiotic, such as Amoxil, Ceftin, Cefzil, Moxatag, Omnicef, and others. Tell your doctor if you have ever had:  liver disease (hepatitis or jaundice);  kidney disease; or  mononucleosis. The liquid or chewable tablet may contain phenylalanine. Tell your doctor if you have phenylketonuria (PKU). Tell your doctor if you are pregnant or breastfeeding. Amoxicillin and clavulanate potassium can make birth control pills less effective. Ask your doctor about using a non-hormonal birth control (condom, diaphragm, cervical cap, or contraceptive sponge) to prevent pregnancy.   Do not give this medicine to a child without medical advice. How should I take amoxicillin and clavulanate potassium? Follow all directions on your prescription label and read all medication guides or instruction sheets. Use the medicine exactly as directed. Amoxicillin and clavulanate potassium may work best if you take it at the start of a meal.  Take the medicine every 12 hours. Do not crush or chew the extended-release tablet. Swallow the pill whole, or break the pill in half and take both halves one at a time. Tell your doctor if you have trouble swallowing a whole or half pill. You must chew the chewable tablet before you swallow it. Shake the oral suspension (liquid) before you measure a dose. Use the dosing syringe provided, or use a medicine dose-measuring device (not a kitchen spoon). This medicine can affect the results of certain medical tests. Tell any doctor who treats you that you are using amoxicillin and clavulanate potassium. Use this medicine for the full prescribed length of time, even if your symptoms quickly improve. Skipping doses can increase your risk of infection that is resistant to medication. Amoxicillin and clavulanate potassium will not treat a viral infection such as the flu or a common cold. Store the tablets at room temperature away from moisture and heat. Store the liquid  in the refrigerator. Throw away any unused liquid after 10 days. What happens if I miss a dose? Take the medicine as soon as you can, but skip the missed dose if it is almost time for your next dose. Do not take two doses at one time. What happens if I overdose? Seek emergency medical attention or call the Poison Help line at 1-519.266.8202. Overdose can cause nausea, vomiting, stomach pain, diarrhea, skin rash, drowsiness, hyperactivity, and decreased urination. What should I avoid while taking amoxicillin and clavulanate potassium?   Avoid taking this medicine together with or just after eating a high-fat meal. This will make it harder for your body to absorb the medication.  Antibiotic medicines can cause diarrhea, which may be a sign of a new infection. If you have diarrhea that is watery or bloody, call your doctor before using anti-diarrhea medicine.  What are the possible side effects of amoxicillin and clavulanate potassium?  Get emergency medical help if you have signs of an allergic reaction (hives, difficult breathing, swelling in your face or throat) or a severe skin reaction (fever, sore throat, burning eyes, skin pain, red or purple skin rash with blistering and peeling).  Call your doctor at once if you have:  severe stomach pain, diarrhea that is watery or bloody (even if it occurs months after your last dose);  pale or yellowed skin, dark colored urine, fever, confusion or weakness;  loss of appetite, upper stomach pain;  little or no urination; or  easy bruising or bleeding.  Common side effects may include:  nausea, vomiting; diarrhea;  rash, itching;  vaginal itching or discharge; or  diaper rash.  This is not a complete list of side effects and others may occur. Call your doctor for medical advice about side effects. You may report side effects to FDA at 5-027-FDA-4929.  What other drugs will affect amoxicillin and clavulanate potassium?  Tell your doctor about all your other medicines, especially:  allopurinol;  probenecid; or  a blood thinner --warfarin, Coumadin, Jantoven.  This list is not complete. Other drugs may affect amoxicillin and clavulanate potassium, including prescription and over-the-counter medicines, vitamins, and herbal products. Not all possible drug interactions are listed here.  Where can I get more information?  Your pharmacist can provide more information about amoxicillin and clavulanate potassium.  Remember, keep this and all other medicines out of the reach of children, never share your medicines with others, and use this medication only for the indication  prescribed. Every effort has been made to ensure that the information provided by [address] is accurate, up-to-date, and complete, but no guarantee is made to that effect. Drug information contained herein may be time sensitive. [facility] information has been compiled for use by healthcare practitioners and consumers in the [facility] and therefore [facility] does not warrant that uses outside of the [facility] are appropriate, unless specifically indicated otherwise. [facility]'s drug information does not endorse drugs, diagnose patients or recommend therapy. [facility] information is an informational resource designed to assist licensed healthcare practitioners in caring for their patients and/or to serve consumers viewing this service as a supplement to, and not a substitute for, the expertise, skill, knowledge and judgment of healthcare practitioners. The absence of a warning for a given drug or drug combination in no way should be construed to indicate that the drug or drug combination is safe, effective or appropriate for any given patient. [facility] does not assume any responsibility for any aspect of healthcare administered with the aid of information [facility] provides. The information contained herein is not intended to cover all possible uses, directions, precautions, warnings, drug interactions, allergic reactions, or adverse effects. If you have questions about the drugs you are taking, check with your doctor, nurse or pharmacist.  Copyright 1997-7822 [address]: 12.01. Revision date: 2/24/2020. Care instructions adapted under license by [facility] [address] . If you have questions about a medical condition or this instruction, always ask your healthcare professional. [place] [address] any warranty or liability for your use of this information.          lisinopril  Pronunciation:  lyse IN oh pril  Brand:  Prinivil, Qbrelis, Zestril  What is the most important information I should know about lisinopril? Do not use if you are pregnant, and tell your doctor right away if you become pregnant. If you have diabetes, do not use lisinopril together with any medication that contains aliskiren (a blood pressure medicine). Do not take lisinopril within 36 hours before or after taking medicine that contains sacubitril (such as Entresto). What is lisinopril? Lisinopril is an ACE inhibitor that is used to treat high blood pressure (hypertension) in adults and children who are at least 10years old. Lisinopril is also used to treat congestive heart failure in adults, or to improve survival after a heart attack. Lisinopril may also be used for purposes not listed in this medication guide. What should I discuss with my healthcare provider before taking lisinopril? You should not use lisinopril if you are allergic to it, or if you:  have a history of angioedema;  recently took a heart medicine called sacubitril; or  are allergic to any other ACE inhibitor, such as benazepril, captopril, enalapril, fosinopril, moexipril, perindopril, quinapril, ramipril, or trandolapril. Do not take lisinopril within 36 hours before or after taking medicine that contains sacubitril (such as Entresto). If you have diabetes, do not use lisinopril together with any medication that contains aliskiren (a blood pressure medicine). You may also need to avoid taking lisinopril with aliskiren if you have kidney disease. Tell your doctor if you have ever had:  kidney disease (or if you are on dialysis);  liver disease; or  high levels of potassium in your blood. Do not use if you are pregnant, and tell your doctor right away if you become pregnant. Lisinopril can cause injury or death to the unborn baby if you take the medicine during your second or third trimester. You should not breastfeed while using this medicine. How should I take lisinopril?   Follow all directions on your prescription label and read all medication guides or instruction sheets. Your doctor may occasionally change your dose. Use the medicine exactly as directed. Drink plenty of water each day while you are taking this medicine. Lisinopril can be taken with or without food. Measure liquid medicine carefully. Use the dosing syringe provided, or use a medicine dose-measuring device (not a kitchen spoon). Your blood pressure will need to be checked often. Your kidney function and electrolytes may also need to be checked. Call your doctor if you are sick with vomiting or diarrhea, or if you are sweating more than usual. You can easily become dehydrated while taking lisinopril. This can lead to very low blood pressure, a serious electrolyte imbalance, or kidney failure. If you need surgery, tell the surgeon ahead of time that you are using lisinopril. If you have high blood pressure, keep using this medicine even if you feel well. High blood pressure often has no symptoms. You may need to use blood pressure medicine for the rest of your life. Store at room temperature away from moisture and heat. Do not freeze the oral liquid. What happens if I miss a dose? Take the medicine as soon as you can, but skip the missed dose if it is almost time for your next dose. Do not take two doses at one time. What happens if I overdose? Seek emergency medical attention or call the Poison Help line at 1-869.747.1311. What should I avoid while taking lisinopril? Drinking alcohol can further lower your blood pressure and may increase certain side effects of lisinopril. Avoid becoming overheated or dehydrated during exercise, in hot weather, or by not drinking enough fluids. Lisinopril can decrease sweating and you may be more prone to heat stroke. Do not use potassium supplements or salt substitutes, unless your doctor has told you to. Avoid getting up too fast from a sitting or lying position, or you may feel dizzy.   What are the possible side effects of lisinopril? Get emergency medical help if you have signs of an allergic reaction: hives; severe stomach pain; difficulty breathing; swelling of your face, lips, tongue, or throat. You may be more likely to have an allergic reaction if you are -American. Call your doctor at once if you have:  a light-headed feeling, like you might pass out;  fever, sore throat;  high potassium --nausea, weakness, tingly feeling, chest pain, irregular heartbeats, loss of movement;  kidney problems --little or no urination, swelling in your feet or ankles, feeling tired or short of breath; or  liver problems --nausea, upper stomach pain, itching, tired feeling, loss of appetite, dark urine, brandon-colored stools, jaundice (yellowing of the skin or eyes). Common side effects may include:  headache, dizziness;  cough; or  chest pain. This is not a complete list of side effects and others may occur. Call your doctor for medical advice about side effects. You may report side effects to FDA at 9-467-FDA-2923. What other drugs will affect lisinopril? Tell your doctor about all your other medicines, especially:  a diuretic or \"water pill\";  lithium;  gold injections to treat arthritis;  insulin or oral diabetes medicine;  a potassium supplement;  medicine to prevent organ transplant rejection --everolimus, sirolimus, tacrolimus, temsirolimus; or  NSAIDs (nonsteroidal anti-inflammatory drugs) --aspirin, ibuprofen (Advil, Motrin), naproxen (Aleve), celecoxib, diclofenac, indomethacin, meloxicam, and others. This list is not complete. Other drugs may affect lisinopril, including prescription and over-the-counter medicines, vitamins, and herbal products. Not all possible drug interactions are listed here. Where can I get more information? Your pharmacist can provide more information about lisinopril.   Remember, keep this and all other medicines out of the reach of children, never share your medicines with others, and use this medication only for the indication prescribed. Every effort has been made to ensure that the information provided by [address] is accurate, up-to-date, and complete, but no guarantee is made to that effect. Drug information contained herein may be time sensitive. [facility] information has been compiled for use by healthcare practitioners and consumers in the [place] and therefore [facility] does not warrant that uses outside of the [place] are appropriate, unless specifically indicated otherwise. [facility]'s drug information does not endorse drugs, diagnose patients or recommend therapy. [facility]'s drug information is an informational resource designed to assist licensed healthcare practitioners in caring for their patients and/or to serve consumers viewing this service as a supplement to, and not a substitute for, the expertise, skill, knowledge and judgment of healthcare practitioners. The absence of a warning for a given drug or drug combination in no way should be construed to indicate that the drug or drug combination is safe, effective or appropriate for any given patient. [facility] does not assume any responsibility for any aspect of healthcare administered with the aid of information [facility] provides. The information contained herein is not intended to cover all possible uses, directions, precautions, warnings, drug interactions, allergic reactions, or adverse effects. If you have questions about the drugs you are taking, check with your doctor, nurse or pharmacist.  Copyright 2666-9947 [address]: 15.03. Revision date: 10/22/2019. Care instructions adapted under license by [facility] ([facility]). If you have questions about a medical condition or this instruction, always ask your healthcare professional. [place] [address] any warranty or liability for your use of this information.          hydralazine  Pronunciation:  willy alcaraz  Brand:  Apresoline  What is the most important information I should know about hydralazine? You should not use this medicine if you have coronary artery disease, or rheumatic heart disease affecting the mitral valve. What is hydralazine? Hydralazine is a vasodilator that works by relaxing the muscles in your blood vessels to help them dilate (widen). This lowers blood pressure and allows blood to flow more easily through your veins and arteries. Hydralazine is used to treat high blood pressure (hypertension). Hydralazine may also be used for purposes not listed in this medication guide. What should I discuss with my healthcare provider before taking hydralazine? You should not use hydralazine if you are allergic to it, or if you have:  coronary artery disease; or  rheumatic heart disease affecting the mitral valve. To make sure hydralazine is safe for you, tell your doctor if you have ever had:  kidney disease;  systemic lupus erythematosus;  angina (chest pain); or  a stroke. It is not known whether this medicine will harm an unborn baby. Tell your doctor if you are pregnant or plan to become pregnant. Hydralazine can pass into breast milk, but effects on the nursing baby are not known. Tell your doctor if you are breast-feeding. Hydralazine is not approved for use by anyone younger than 25years old. How should I take hydralazine? Follow all directions on your prescription label. Do not take this medicine in larger or smaller amounts or for longer than recommended. Your blood pressure will need to be checked often. You may also need frequent blood tests. Keep using this medicine as directed, even if you feel well. High blood pressure often has no symptoms. You may need to use blood pressure medicine for the rest of your life. Store at room temperature away from moisture and heat. What happens if I miss a dose? Take the missed dose as soon as you remember.  Skip the missed dose if it is almost time for your next scheduled dose. Do not take extra medicine to make up the missed dose. What happens if I overdose? Seek emergency medical attention or call the Poison Help line at 1-181.667.1543. Overdose symptoms may include rapid heartbeats, warmth or tingling under your skin, chest pain, or fainting. What should I avoid while taking hydralazine? Avoid getting up too fast from a sitting or lying position, or you may feel dizzy. Get up slowly and steady yourself to prevent a fall. What are the possible side effects of hydralazine? Get emergency medical help if you have signs of an allergic reaction:  hives; difficult breathing; swelling of your face, lips, tongue, or throat. Call your doctor at once if you have:  chest pain or pressure, pain spreading to your jaw or shoulder;  fast or pounding heartbeats;  a light-headed feeling, like you might pass out;  numbness, tingling, or burning pain in your hands or feet;  painful or difficult urination;  little or no urination; or  lupus-like syndrome --joint pain or swelling with fever, swollen glands, muscle aches, chest pain, vomiting, unusual thoughts or behavior, and patchy skin color. Common side effects may include:  chest pain, fast heart rate;  headache; or  nausea, vomiting, diarrhea, loss of appetite. This is not a complete list of side effects and others may occur. Call your doctor for medical advice about side effects. You may report side effects to FDA at 5-062-UNO-2734. What other drugs will affect hydralazine? Tell your doctor about all your current medicines and any you start or stop using, especially:  diazoxide (an injectable blood pressure medication); or  an MAO inhibitor --isocarboxazid, linezolid, methylene blue injection, phenelzine, rasagiline, selegiline, tranylcypromine, and others. This list is not complete. Other drugs may interact with hydralazine, including prescription and over-the-counter medicines, vitamins, and herbal products.  Not all possible interactions are listed in this medication guide. Where can I get more information? Your pharmacist can provide more information about hydralazine. Remember, keep this and all other medicines out of the reach of children, never share your medicines with others, and use this medication only for the indication prescribed. Every effort has been made to ensure that the information provided by [address] is accurate, up-to-date, and complete, but no guarantee is made to that effect. Drug information contained herein may be time sensitive. [facility] information has been compiled for use by healthcare practitioners and consumers in the [place] and therefore [facility] does not warrant that uses outside of the [place] are appropriate, unless specifically indicated otherwise. [facility]'s drug information does not endorse drugs, diagnose patients or recommend therapy. [facility]'s drug information is an informational resource designed to assist licensed healthcare practitioners in caring for their patients and/or to serve consumers viewing this service as a supplement to, and not a substitute for, the expertise, skill, knowledge and judgment of healthcare practitioners. The absence of a warning for a given drug or drug combination in no way should be construed to indicate that the drug or drug combination is safe, effective or appropriate for any given patient. [facility] does not assume any responsibility for any aspect of healthcare administered with the aid of information [facility] provides. The information contained herein is not intended to cover all possible uses, directions, precautions, warnings, drug interactions, allergic reactions, or adverse effects. If you have questions about the drugs you are taking, check with your doctor, nurse or pharmacist.  Copyright 5408-8961 [address]. Version: 5.01. Revision date: 1/10/2018. Care instructions adapted under license by [facility] ([facility]).  If you have questions about a medical condition or this instruction, always ask your healthcare professional. [employer] disclaims any warranty or liability for your use of this information.

## 2023-03-16 LAB
BACTERIA SPEC CULT: NORMAL
BACTERIA SPEC CULT: NORMAL
SERVICE CMNT-IMP: NORMAL
SERVICE CMNT-IMP: NORMAL

## 2023-04-04 LAB
ACETOHEXAMIDE SERPL-MCNC: NEGATIVE UG/ML (ref 20–60)
CHLORPROPAMIDE SERPL-MCNC: NEGATIVE UG/ML (ref 75–250)
GLIMEPIRIDE SERPL-MCNC: NEGATIVE NG/ML (ref 80–250)
GLIPIZIDE SERPL-MCNC: NEGATIVE NG/ML (ref 200–1000)
GLYBURIDE SERPL-MCNC: NEGATIVE NG/ML
NATEGLINIDE SERPL-MCNC: NEGATIVE NG/ML
REPAGLINIDE SERPL-MCNC: NEGATIVE NG/ML
TOLAZAMIDE SERPL-MCNC: NEGATIVE UG/ML
TOLBUTAMIDE SERPL-MCNC: NEGATIVE UG/ML (ref 40–100)

## 2023-05-21 ENCOUNTER — HOSPITAL ENCOUNTER (EMERGENCY)
Facility: HOSPITAL | Age: 53
Discharge: HOME | End: 2023-05-21
Attending: EMERGENCY MEDICINE
Payer: COMMERCIAL

## 2023-05-21 VITALS
TEMPERATURE: 97.4 F | BODY MASS INDEX: 25.18 KG/M2 | WEIGHT: 170 LBS | HEART RATE: 71 BPM | HEIGHT: 69 IN | DIASTOLIC BLOOD PRESSURE: 93 MMHG | OXYGEN SATURATION: 100 % | SYSTOLIC BLOOD PRESSURE: 135 MMHG | RESPIRATION RATE: 16 BRPM

## 2023-05-21 DIAGNOSIS — S01.81XA FACIAL LACERATION, INITIAL ENCOUNTER: Primary | ICD-10-CM

## 2023-05-21 PROCEDURE — 12011 RPR F/E/E/N/L/M 2.5 CM/<: CPT

## 2023-05-21 PROCEDURE — 99283 EMERGENCY DEPT VISIT LOW MDM: CPT

## 2023-05-21 PROCEDURE — 6370000000 HC RX 637 (ALT 250 FOR IP): Performed by: EMERGENCY MEDICINE

## 2023-05-21 RX ORDER — CEPHALEXIN 500 MG/1
500 CAPSULE ORAL 4 TIMES DAILY
Qty: 20 CAPSULE | Refills: 0 | Status: SHIPPED | OUTPATIENT
Start: 2023-05-21

## 2023-05-21 RX ORDER — CEPHALEXIN 250 MG/1
500 CAPSULE ORAL
Status: COMPLETED | OUTPATIENT
Start: 2023-05-21 | End: 2023-05-21

## 2023-05-21 RX ORDER — BACITRACIN ZINC 500 [USP'U]/G
OINTMENT TOPICAL
Status: COMPLETED | OUTPATIENT
Start: 2023-05-21 | End: 2023-05-21

## 2023-05-21 RX ADMIN — CEPHALEXIN 500 MG: 250 CAPSULE ORAL at 01:47

## 2023-05-21 RX ADMIN — BACITRACIN ZINC: 500 OINTMENT TOPICAL at 01:48

## 2023-05-21 ASSESSMENT — ENCOUNTER SYMPTOMS: RESPIRATORY NEGATIVE: 1

## 2023-05-21 ASSESSMENT — PAIN - FUNCTIONAL ASSESSMENT: PAIN_FUNCTIONAL_ASSESSMENT: NONE - DENIES PAIN

## 2023-06-26 ENCOUNTER — OFFICE VISIT (OUTPATIENT)
Age: 53
End: 2023-06-26
Payer: COMMERCIAL

## 2023-06-26 VITALS — HEIGHT: 69 IN | WEIGHT: 153 LBS | TEMPERATURE: 97.7 F | BODY MASS INDEX: 22.66 KG/M2

## 2023-06-26 DIAGNOSIS — G89.29 CHRONIC PAIN OF RIGHT KNEE: Primary | ICD-10-CM

## 2023-06-26 DIAGNOSIS — G89.29 CHRONIC PAIN OF LEFT KNEE: ICD-10-CM

## 2023-06-26 DIAGNOSIS — M17.11 PRIMARY OSTEOARTHRITIS OF RIGHT KNEE: ICD-10-CM

## 2023-06-26 DIAGNOSIS — M25.561 CHRONIC PAIN OF RIGHT KNEE: Primary | ICD-10-CM

## 2023-06-26 DIAGNOSIS — M17.12 PRIMARY OSTEOARTHRITIS OF LEFT KNEE: ICD-10-CM

## 2023-06-26 DIAGNOSIS — M25.562 CHRONIC PAIN OF LEFT KNEE: ICD-10-CM

## 2023-06-26 PROCEDURE — 99203 OFFICE O/P NEW LOW 30 MIN: CPT | Performed by: SPECIALIST

## 2023-06-26 PROCEDURE — 73562 X-RAY EXAM OF KNEE 3: CPT | Performed by: SPECIALIST

## 2023-07-24 ENCOUNTER — HOSPITAL ENCOUNTER (OUTPATIENT)
Facility: HOSPITAL | Age: 53
Setting detail: RECURRING SERIES
Discharge: HOME | End: 2023-07-27
Payer: COMMERCIAL

## 2023-07-24 PROCEDURE — 97161 PT EVAL LOW COMPLEX 20 MIN: CPT

## 2023-07-24 NOTE — PROGRESS NOTES
[address] PHYSICAL THERAPY AT [facility]  [address], [facility], [address] [place] [address]  Phone: [phone] Fax [phone]  Plan of Care / Statement of Necessity for Physical Therapy Services     Patient Name: Lee Jay : 1970   Treatment   Diagnosis: M25.561  RIGHT KNEE PAIN and M25.562  LEFT KNEE PAIN  Medical Diagnosis: Primary osteoarthritis of left knee [M17.12]  Primary osteoarthritis of right knee [M17.11]   Onset Date: 23 (script) Payor Source: Payor: [id number] / Plan: [id number] / Product Type: *No Product type* /    Referral Source: Sally Cuenca MD [place] of Pending sale to [facility]): 2023   Prior Hospitalization: See medical history Provider #: [id number]   Prior Level of Function: Works in warehouse; 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 male who presents with c/o 10 year hx of B knee pain that began after work-related injury that resulted in diskal injuries that progressed to radiating pain into B knees. Pt notes ongoing pain in B knees ever since but denies paresthesias. X-rays revealed mild effusion. Functional deficits include: disrupted sleep, waking up multiple times - tosses and turns a lot due to low back and B knee pain - likes to sleep flat on floor or on cot; difficulty reaching objects overhead; unable to fully straighten knees due to muscle spasms; unable to stoop or kneel; 5-10 minute ambulation tolerance, then knees buckle. Upon exam, Pt exhibited impaired B hip flexor/quad, HS flexibility, reproducing Pt's pain; impaired B hip/knee strength; impaired standing balance and gait deviations.   Pt would benefit from skilled PT to address above deficits to improve Pt's function and ability to

## 2023-07-24 NOTE — PROGRESS NOTES
PT DAILY TREATMENT NOTE/KNEE EVAL       Patient Name: Canelo Pineda    Date: 2023    : 1970  Insurance: Payor: [employer] [place] / Plan: [employer] [facility] / Product Type: *No Product type* /      Patient  verified yes     Visit #   Current / Total 1 24   Time   In / Out 3:30 pm 4:06 pm   Pain   In / Out 10/10 10/10   Subjective Functional Status/Changes: See Subjective Summary     Treatment Area: Primary osteoarthritis of left knee [M17.12]  Primary osteoarthritis of right knee [M17.11]    SUBJECTIVE    Subjective functional status/changes:     Chief Complaint: B knee pain  History/Mechanism of Injury: Pt with > 10 year hx of B knee pain that began after work-related injury that resulted in diskal injuries that resulted in radiating pain from low back to B knees. Pt notes ongoing pain in B knees ever since. Pt denies paresthesias. Current Symptoms/Deficits: disrupted sleep, waking up multiple times - tosses and turns a lot due to low back and B knee pain - likes to sleep flat on floor or cot; difficulty reaching objects overhead; unable to fully straighten knees due to muscle spasms; unable to stoop or kneel; 5-10 minute ambulation tolerance, then knees buckle  Pain-  Current: 10/10     Worst: 10/10   Best: 8/10  Previous Treatment/Compliance: pain medication, hot washcloth, pain cream  Mobility Devices: FWW prn  PMHx/Surgical Hx: Alcohol use; Arthritis; Back pain; Headaches; HTN; Recreational drug use - marijuana    Diagnostic Tests: [] Lab work [x] X-rays    [] CT [] MRI     [] Other:  Results: fluid B knees    Work Hx:  - working 36 hrs per week; lifting - missing more work currently  Living Situation: 1-story home with sister's family  Household Modifications: none  Hobbies: none  PLOF: Works in [employer]; lives in [place] home with sister's family; functionally independent  Pt Goals:  \"To find out what's going on with my

## 2023-08-08 ENCOUNTER — HOSPITAL ENCOUNTER (OUTPATIENT)
Facility: HOSPITAL | Age: 53
Setting detail: RECURRING SERIES
Discharge: HOME | End: 2023-08-11
Payer: COMMERCIAL

## 2023-08-08 PROCEDURE — 97110 THERAPEUTIC EXERCISES: CPT

## 2023-08-08 PROCEDURE — 97530 THERAPEUTIC ACTIVITIES: CPT

## 2023-08-08 PROCEDURE — 97112 NEUROMUSCULAR REEDUCATION: CPT

## 2023-08-08 NOTE — PROGRESS NOTES
PHYSICAL / OCCUPATIONAL THERAPY - DAILY TREATMENT NOTE (updated )    Patient Name: Celina Cardenas    Date: 2023    : 1970  Insurance: Payor: [id number] / Plan: [id number] / Product Type: *No Product type* /      Patient  verified Yes     Visit #   Current / Total 2 24   Time   In / Out 1224 pm 1305 pm   Pain   In / Out 8/10 6/10   Subjective Functional Status/Changes: Pt reports compliance with initial HEP      TREATMENT AREA =  Primary osteoarthritis of left knee [M17.12]  Primary osteoarthritis of right knee [M17.11]    OBJECTIVE         Therapeutic Procedures: Tx Min Billable or 1:1 Min (if diff from Tx Min) Procedure, Rationale, Specifics   18  86800 Therapeutic Exercise (timed):  increase ROM, strength, coordination, balance, and proprioception to improve patient's ability to progress to PLOF and address remaining functional goals. (see flow sheet as applicable)     Details if applicable:       8  65171 Therapeutic Activity (timed):  use of dynamic activities replicating functional movements to increase ROM, strength, coordination, balance, and proprioception in order to improve patient's ability to progress to PLOF and address remaining functional goals. (see flow sheet as applicable)     Details if applicable:     15  07792 Neuromuscular Re-Education (timed):  improve balance, coordination, kinesthetic sense, posture, core stability and proprioception to improve patient's ability to develop conscious control of individual muscles and awareness of position of extremities in order to progress to PLOF and address remaining functional goals.  (see flow sheet as applicable)     Details if applicable:            Details if applicable:            Details if applicable:     39  [facility] Totals Reminder: bill using total billable min of TIMED therapeutic procedures (example: do not include dry needle or estim unattended, both untimed codes, in totals to left)  8

## 2023-08-10 ENCOUNTER — HOSPITAL ENCOUNTER (OUTPATIENT)
Facility: HOSPITAL | Age: 53
Setting detail: RECURRING SERIES
Discharge: HOME | End: 2023-08-13
Payer: COMMERCIAL

## 2023-08-10 PROCEDURE — 97112 NEUROMUSCULAR REEDUCATION: CPT

## 2023-08-10 PROCEDURE — 97530 THERAPEUTIC ACTIVITIES: CPT

## 2023-08-10 PROCEDURE — 97110 THERAPEUTIC EXERCISES: CPT

## 2023-08-10 NOTE — PROGRESS NOTES
left)  8-22 min = 1 unit; 23-37 min = 2 units; 38-52 min = 3 units; 53-67 min = 4 units; 68-82 min = 5 units   Total Total     [x]  Patient Education billed concurrently with other procedures   [x] Review HEP    [] Progressed/Changed HEP, detail:    [] Other detail:       Objective Information/Functional Measures/Assessment  Initiated wall squats; progressed repetitions for TKE at wall, chair standing leg pump, march walking and side stepping, and foam step overs. Skilled verbal cues provided to perform exercises with proper form. Pt challenged with VMO ball squeeze with LAQ. Pt tolerated treatment session with no increased pain and no adverse reactions noted. Patient will continue to benefit from skilled PT / OT services to modify and progress therapeutic interventions, analyze and address functional mobility deficits, analyze and address ROM deficits, analyze and address strength deficits, analyze and address soft tissue restrictions, analyze and cue for proper movement patterns, analyze and modify for postural abnormalities, analyze and address imbalance/dizziness, and instruct in home and community integration to address functional deficits and attain remaining goals. Progress toward goals / Updated goals:  []  See Progress Note/Recertification    Short Term Goals: To be accomplished in 2 weeks:  1- Goal: Pt to be compliant with initial HEP to improve hip/knee mobility, postural positioning, and B knee strength. Status at last note/certification: Established and reviewed with Pt  Current: Pt reports compliance, no questions at this time (8/8/23) met     Long Term Goals: To be accomplished in 24 treatments:  1- Goal: Pt to increase B hip/knee strength to 5/5 grossly to increase ability to perform household chores, job duties safely without falls risk.   Status at last note/certification:     Right (/5) Left (/5)   Hip     Flexion 3+ 3+             Abduction 3+ 3+             Adduction 3+ 3+

## 2023-08-15 ENCOUNTER — APPOINTMENT (OUTPATIENT)
Facility: HOSPITAL | Age: 53
End: 2023-08-15
Payer: COMMERCIAL

## 2023-08-17 ENCOUNTER — APPOINTMENT (OUTPATIENT)
Facility: HOSPITAL | Age: 53
End: 2023-08-17
Payer: COMMERCIAL

## 2023-08-24 ENCOUNTER — HOSPITAL ENCOUNTER (EMERGENCY)
Facility: HOSPITAL | Age: 53
Discharge: HOME | End: 2023-08-24
Attending: EMERGENCY MEDICINE

## 2023-08-24 VITALS
WEIGHT: 155 LBS | HEART RATE: 53 BPM | TEMPERATURE: 98.5 F | DIASTOLIC BLOOD PRESSURE: 85 MMHG | SYSTOLIC BLOOD PRESSURE: 127 MMHG | OXYGEN SATURATION: 98 % | HEIGHT: 71 IN | RESPIRATION RATE: 20 BRPM | BODY MASS INDEX: 21.7 KG/M2

## 2023-08-24 DIAGNOSIS — Z48.02 VISIT FOR SUTURE REMOVAL: Primary | ICD-10-CM

## 2023-08-24 ASSESSMENT — ENCOUNTER SYMPTOMS
SINUS PRESSURE: 0
RESPIRATORY NEGATIVE: 1
EYE PAIN: 0
NAUSEA: 0
COLOR CHANGE: 0

## 2023-08-24 ASSESSMENT — LIFESTYLE VARIABLES
HOW OFTEN DO YOU HAVE A DRINK CONTAINING ALCOHOL: MONTHLY OR LESS
HOW MANY STANDARD DRINKS CONTAINING ALCOHOL DO YOU HAVE ON A TYPICAL DAY: 1 OR 2

## 2023-08-24 ASSESSMENT — PAIN SCALES - GENERAL: PAINLEVEL_OUTOF10: 0

## 2023-08-24 ASSESSMENT — PAIN - FUNCTIONAL ASSESSMENT: PAIN_FUNCTIONAL_ASSESSMENT: 0-10

## 2023-08-24 NOTE — ED PROVIDER NOTES
[facility] EMERGENCY DEPT  EMERGENCY DEPARTMENT ENCOUNTER      Pt Name: Micaela Alford  MRN: [id number]  [address] [facility] 1970  Date of evaluation: 8/24/2023  Provider: Lizzy Cruz       Chief Complaint   Patient presents with    Suture / Staple Removal         HISTORY OF PRESENT ILLNESS   (Location/Symptom, Timing/Onset, Context/Setting, Quality, Duration, Modifying Factors, Severity)  Note limiting factors. Micaela Alford is a 47 y.o. male who presents to the emergency department for suture removal.  Patient states that he admits that he failed to get his sutures removed when recommended. He states that they have been there since May 2023 at right eyebrow area. He states that there were 7 sutures and 3 of them have fallen out on their own over time. No fever, pain, or pus drainage, and no other complaints. HPI    Nursing Notes were reviewed. REVIEW OF SYSTEMS    (2-9 systems for level 4, 10 or more for level 5)     Review of Systems   Constitutional:  Negative for chills, diaphoresis and fever. HENT:  Negative for sinus pressure. Eyes:  Negative for pain and visual disturbance. Respiratory: Negative. Cardiovascular: Negative. Gastrointestinal:  Negative for nausea. Musculoskeletal:  Negative for neck pain. Skin:  Negative for color change and rash. Neurological:  Negative for dizziness. Hematological:  Negative for adenopathy. Psychiatric/Behavioral:  Negative for confusion. All other systems reviewed and are negative. Except as noted above the remainder of the review of systems was reviewed and negative. PAST MEDICAL HISTORY     Past Medical History:   Diagnosis Date    Alcoholism ([address])     Ill-defined condition     ETOH    Marijuana smoker          SURGICAL HISTORY     History reviewed. No pertinent surgical history.       CURRENT MEDICATIONS       Discharge Medication List as of 8/24/2023  8:27 AM        CONTINUE these medications which have NOT

## 2023-08-29 ENCOUNTER — APPOINTMENT (OUTPATIENT)
Facility: HOSPITAL | Age: 53
End: 2023-08-29
Payer: COMMERCIAL

## 2023-08-31 ENCOUNTER — HOSPITAL ENCOUNTER (OUTPATIENT)
Facility: HOSPITAL | Age: 53
Setting detail: RECURRING SERIES
End: 2023-08-31
Payer: COMMERCIAL

## 2023-08-31 PROCEDURE — 97530 THERAPEUTIC ACTIVITIES: CPT

## 2023-08-31 PROCEDURE — 97110 THERAPEUTIC EXERCISES: CPT

## 2023-08-31 PROCEDURE — 97112 NEUROMUSCULAR REEDUCATION: CPT

## 2023-08-31 NOTE — PROGRESS NOTES
PHYSICAL / OCCUPATIONAL THERAPY - DAILY TREATMENT NOTE (updated )    Patient Name: Kaden Orf    Date: 2023    : 1970  Insurance: Payor: [employer] [place] / Plan: [id number] / Product Type: *No Product type* /      Patient  verified Yes     Visit #   Current / Total 1 21   Time   In / Out 2:03 2:53   Pain   In / Out 7 7   Subjective Functional Status/Changes: I walk a lot. I do use a cane occassionally     TREATMENT AREA =  Primary osteoarthritis of left knee [M17.12]  Primary osteoarthritis of right knee [M17.11]    OBJECTIVE      Therapeutic Procedures: Tx Min Billable or 1:1 Min (if diff from Tx Min) Procedure, Rationale, Specifics   12  08131 Therapeutic Activity (timed):  use of dynamic activities replicating functional movements to increase ROM, strength, coordination, balance, and proprioception in order to improve patient's ability to progress to PLOF and address remaining functional goals. (see flow sheet as applicable)     Details if applicable:       18  64615 Neuromuscular Re-Education (timed):  improve balance, coordination, kinesthetic sense, posture, core stability and proprioception to improve patient's ability to develop conscious control of individual muscles and awareness of position of extremities in order to progress to PLOF and address remaining functional goals. (see flow sheet as applicable)     Details if applicable:     20  28393 Therapeutic Exercise (timed):  increase ROM, strength, coordination, balance, and proprioception to improve patient's ability to progress to PLOF and address remaining functional goals.  (see flow sheet as applicable)     Details if applicable:            Details if applicable:            Details if applicable:     48  [address] Reminder: bill using total billable min of TIMED therapeutic procedures (example: do not include dry needle or estim unattended, both untimed codes, in totals to left)  8-22 min = 1

## 2023-08-31 NOTE — PROGRESS NOTES
[address] [facility] PHYSICAL THERAPY AT [facility]   [address] , [facility], [address]   Phone: [phone] Fax: [phone]  PROGRESS NOTE  Patient Name: Ilana Crawford : 1970   Treatment/Medical Diagnosis: Primary osteoarthritis of left knee [M17.12]  Primary osteoarthritis of right knee [M17.11]   Referral Source: Bhavna Walker MD     Payor: Payor: [id number] / Plan: [id number] / Product Type: *No Product type* /            Date of Initial Visit: 2023 Attended Visits: 4 Missed Visits: 1       CURRENT GOAL STATUS  Short Term Goals: To be accomplished in 2 weeks:  1- Goal: Pt to be compliant with initial HEP to improve hip/knee mobility, postural positioning, and B knee strength. Status at last note/certification: Established and reviewed with Pt  Current: met - Pt reports compliance, no questions at this time (23)      Long Term Goals: To be accomplished in 24 treatments:  1- Goal: Pt to increase B hip/knee strength to 5/5 grossly to increase ability to perform household chores, job duties safely without falls risk. Status at last note/certification:     Right (/5) Left (/5)   Hip     Flexion 3+ 3+             Abduction 3+ 3+             Adduction 3+ 3+             Extension 3+ 3+             ER 3+ 3+             IR 5 5   Knee   Extension 5 4+    current: progressing - see below (23)    Right (/5) Left (/5)   Hip     Flexion 4+ 3+             Abduction 4 4-             Adduction 3+ 3+             Extension 3+ 3+             ER 4 4             IR 4+ 4+   Knee   Extension 5 5         2- Goal: Pt to increase standing/amb tolerance to 45 minutes without increased pain or fatigue to perform full work shifts without difficulty. Status at last note/certification: 2-21 minute tolerance then pain, knee buckling, needing to sit  Current: Progressin minutes to 1 hours, but with intermittent us of SPC for support.

## 2023-09-01 ENCOUNTER — APPOINTMENT (OUTPATIENT)
Facility: HOSPITAL | Age: 53
End: 2023-09-01
Payer: COMMERCIAL

## 2023-09-02 ENCOUNTER — HOSPITAL ENCOUNTER (EMERGENCY)
Facility: HOSPITAL | Age: 53
Discharge: HOME | End: 2023-09-02
Attending: EMERGENCY MEDICINE
Payer: COMMERCIAL

## 2023-09-02 VITALS
SYSTOLIC BLOOD PRESSURE: 116 MMHG | HEIGHT: 71 IN | OXYGEN SATURATION: 100 % | RESPIRATION RATE: 18 BRPM | DIASTOLIC BLOOD PRESSURE: 68 MMHG | TEMPERATURE: 98.7 F | HEART RATE: 83 BPM | BODY MASS INDEX: 22.4 KG/M2 | WEIGHT: 160 LBS

## 2023-09-02 DIAGNOSIS — L84 CALLUS OF HAND: Primary | ICD-10-CM

## 2023-09-02 PROCEDURE — 99283 EMERGENCY DEPT VISIT LOW MDM: CPT

## 2023-09-02 RX ORDER — CEPHALEXIN 500 MG/1
500 CAPSULE ORAL 4 TIMES DAILY
Qty: 28 CAPSULE | Refills: 0 | Status: SHIPPED | OUTPATIENT
Start: 2023-09-02

## 2023-09-02 RX ORDER — IBUPROFEN 600 MG/1
600 TABLET ORAL 3 TIMES DAILY PRN
Qty: 30 TABLET | Refills: 0 | Status: SHIPPED | OUTPATIENT
Start: 2023-09-02

## 2023-09-02 ASSESSMENT — PAIN SCALES - GENERAL: PAINLEVEL_OUTOF10: 8

## 2023-09-02 ASSESSMENT — PAIN DESCRIPTION - ORIENTATION: ORIENTATION: RIGHT

## 2023-09-02 ASSESSMENT — ENCOUNTER SYMPTOMS: RESPIRATORY NEGATIVE: 1

## 2023-09-02 ASSESSMENT — PAIN DESCRIPTION - LOCATION: LOCATION: FINGER (COMMENT WHICH ONE)

## 2023-09-02 ASSESSMENT — PAIN DESCRIPTION - PAIN TYPE: TYPE: ACUTE PAIN

## 2023-09-02 ASSESSMENT — LIFESTYLE VARIABLES
HOW MANY STANDARD DRINKS CONTAINING ALCOHOL DO YOU HAVE ON A TYPICAL DAY: PATIENT DOES NOT DRINK
HOW OFTEN DO YOU HAVE A DRINK CONTAINING ALCOHOL: NEVER

## 2023-09-02 ASSESSMENT — PAIN - FUNCTIONAL ASSESSMENT
PAIN_FUNCTIONAL_ASSESSMENT: 0-10
PAIN_FUNCTIONAL_ASSESSMENT: ACTIVITIES ARE NOT PREVENTED

## 2023-09-02 ASSESSMENT — PAIN DESCRIPTION - DESCRIPTORS: DESCRIPTORS: ACHING

## 2023-09-02 ASSESSMENT — PAIN DESCRIPTION - FREQUENCY: FREQUENCY: CONTINUOUS

## 2023-09-03 NOTE — ED TRIAGE NOTES
Pt c/o right index finger pain and discoloration since 1 month. Pt stated \"that he notice the discoloration and pain 1 month ago, thought it was due to a splinter\".     Past Medical History:   Diagnosis Date    Alcoholism ([address])     Ill-defined condition     ETOH    Marijuana smoker

## 2023-09-03 NOTE — ED PROVIDER NOTES
`[facility] EMERGENCY DEPT  eMERGENCY dEPARTMENT eNCOUnter      Pt Name: Celina Cardenas  MRN: [id number]  [address] [facility] [place] 1970 of evaluation: 9/2/2023  Provider:Devyn READ [employer], [address] [place] [address] COMPLAINT         HPI  Celina Cardenas is a 47 y.o. male  c/o having pain in hs right index finger x 1 month. He stated he got something in his right index finger and his ringer got infected. The area of the infection in his finger has became hard. He C/O having mild pain in his finger. ROS    Review of Systems   Constitutional:  Negative for activity change. Respiratory: Negative. Cardiovascular: Negative. Musculoskeletal:  Positive for myalgias (right index finger). Except as noted above the remainder of the review of systems was reviewed and negative. PAST MEDICAL HISTORY     Past Medical History:   Diagnosis Date    Alcoholism ([address])     Ill-defined condition     ETOH    Marijuana smoker          SURGICAL HISTORY     History reviewed. No pertinent surgical history. CURRENTMEDICATIONS       Previous Medications    ACETAMINOPHEN (TYLENOL) 325 MG TABLET    Take 650 mg by mouth every 6 hours as needed for Pain or Fever    AMLODIPINE (NORVASC) 10 MG TABLET    Take 1 tablet by mouth nightly    CEPHALEXIN (KEFLEX) 500 MG CAPSULE    Take 1 capsule by mouth 4 times daily    HYDRALAZINE (APRESOLINE) 50 MG TABLET    Take 1 tablet by mouth every 8 hours    IBUPROFEN (ADVIL;MOTRIN) 200 MG TABLET    Take 200 mg by mouth 2 times daily as needed for Pain    LISINOPRIL (PRINIVIL;ZESTRIL) 10 MG TABLET    Take 1 tablet by mouth daily       ALLERGIES     Patient has no known allergies.     FAMILY HISTORY       Family History   Problem Relation Age of Onset    Diabetes type 2  Mother           SOCIAL HISTORY       Social History     Socioeconomic History    Marital status: Single     Spouse name: None    Number of children: None    Years of education: None    Highest education level: None   Tobacco Use    Smoking status:

## 2023-09-03 NOTE — ED NOTES
Patient discharged at this time. This RN reviewed discharge instructions at this time with the patient. patient verbalized understanding and does not have any questions. Pt ambulatory upon discharge, & in stable condition. Pt armband removed & shredded.         Rashad Simons RN  09/02/23 7296

## 2023-09-05 ENCOUNTER — HOSPITAL ENCOUNTER (OUTPATIENT)
Facility: HOSPITAL | Age: 53
Setting detail: RECURRING SERIES
Discharge: HOME | End: 2023-09-08
Payer: COMMERCIAL

## 2023-09-05 PROCEDURE — 97112 NEUROMUSCULAR REEDUCATION: CPT

## 2023-09-05 PROCEDURE — 97110 THERAPEUTIC EXERCISES: CPT

## 2023-09-05 PROCEDURE — 97530 THERAPEUTIC ACTIVITIES: CPT

## 2023-09-05 NOTE — PROGRESS NOTES
= 2 units; 38-52 min = 3 units; 53-67 min = 4 units; 68-82 min = 5 units   Total Total     [x]  Patient Education billed concurrently with other procedures   [x] Review HEP    [] Progressed/Changed HEP, detail:    [] Other detail:       Objective Information/Functional Measures/Assessment  Patient seen today for   Primary osteoarthritis of left knee [M17.12], Primary osteoarthritis of right knee [M17.11]. Progressed program for LE strengthening with addition of  2# weights for hip x 3, HS curls and march. All ex's performed with correct technique with exception of wall squats. Cues to correct posture and widen MATI for improved stability. Patient will continue to benefit from skilled PT / OT services to modify and progress therapeutic interventions, analyze and address functional mobility deficits, analyze and address ROM deficits, analyze and address strength deficits, analyze and address soft tissue restrictions, analyze and cue for proper movement patterns, analyze and modify for postural abnormalities, analyze and address imbalance/dizziness, and instruct in home and community integration to address functional deficits and attain remaining goals. Progress toward goals / Updated goals:  []  See Progress Note/Recertification    1- Goal: Pt to increase B hip/knee strength to 5/5 grossly to increase ability to perform household chores, job duties safely without falls risk. Status at last note/certification: progressing - see below (8/31/23)    Right (/5) Left (/5)   Hip     Flexion 4+ 3+             Abduction 4 4-             Adduction 3+ 3+             Extension 3+ 3+             ER 4 4             IR 4+ 4+   Knee   Extension 5 5      current:      2- Goal: Pt to increase standing/amb tolerance to 45 minutes without increased pain or fatigue to perform full work shifts without difficulty. Status at last note/certification: 30 minutes to 1 hours, but with intermittent us of SPC for support.

## 2023-09-07 ENCOUNTER — APPOINTMENT (OUTPATIENT)
Facility: HOSPITAL | Age: 53
End: 2023-09-07
Payer: COMMERCIAL

## 2023-09-12 ENCOUNTER — HOSPITAL ENCOUNTER (OUTPATIENT)
Facility: HOSPITAL | Age: 53
Setting detail: RECURRING SERIES
Discharge: HOME | End: 2023-09-15
Payer: COMMERCIAL

## 2023-09-12 PROCEDURE — 97530 THERAPEUTIC ACTIVITIES: CPT

## 2023-09-12 PROCEDURE — 97112 NEUROMUSCULAR REEDUCATION: CPT

## 2023-09-12 PROCEDURE — 97110 THERAPEUTIC EXERCISES: CPT

## 2023-09-12 NOTE — PROGRESS NOTES
PHYSICAL / OCCUPATIONAL THERAPY - DAILY TREATMENT NOTE (updated )    Patient Name: David Cerna    Date: 2023    : 1970  Insurance: Payor: [employer] [place] / Plan: [id number] / Product Type: *No Product type* /      Patient  verified Yes     Visit #   Current / Total 3 24   Time   In / Out 1403 pm 1444 pm   Pain   In / Out 0/10 610   Subjective Functional Status/Changes: Pt reports occasional use of cane, denies pain pre PT session today      TREATMENT AREA =    Primary osteoarthritis of left knee [M17.12]  Primary osteoarthritis of right knee [M17.11]    OBJECTIVE    Therapeutic Procedures: Tx Min Billable or 1:1 Min (if diff from Tx Min) Procedure, Rationale, Specifics   16  57084 Therapeutic Exercise (timed):  increase ROM, strength, coordination, balance, and proprioception to improve patient's ability to progress to PLOF and address remaining functional goals. (see flow sheet as applicable)     Details if applicable:       10  71868 Therapeutic Activity (timed):  use of dynamic activities replicating functional movements to increase ROM, strength, coordination, balance, and proprioception in order to improve patient's ability to progress to PLOF and address remaining functional goals. (see flow sheet as applicable)     Details if applicable:     15  77068 Neuromuscular Re-Education (timed):  improve balance, coordination, kinesthetic sense, posture, core stability and proprioception to improve patient's ability to develop conscious control of individual muscles and awareness of position of extremities in order to progress to PLOF and address remaining functional goals.  (see flow sheet as applicable)     Details if applicable:            Details if applicable:            Details if applicable:     39  [facility] Totals Reminder: bill using total billable min of TIMED therapeutic procedures (example: do not include dry needle or estim unattended, both untimed

## 2023-09-14 ENCOUNTER — HOSPITAL ENCOUNTER (OUTPATIENT)
Facility: HOSPITAL | Age: 53
Setting detail: RECURRING SERIES
Discharge: HOME | End: 2023-09-17
Payer: COMMERCIAL

## 2023-09-14 PROCEDURE — 97112 NEUROMUSCULAR REEDUCATION: CPT

## 2023-09-14 PROCEDURE — 97110 THERAPEUTIC EXERCISES: CPT

## 2023-09-14 PROCEDURE — 97530 THERAPEUTIC ACTIVITIES: CPT

## 2023-09-14 NOTE — PROGRESS NOTES
PHYSICAL / OCCUPATIONAL THERAPY - DAILY TREATMENT NOTE (updated )    Patient Name: Bobbi Cardenas    Date: 2023    : 1970  Insurance: Payor: [employer] [place] / Plan: [id number] / Product Type: *No Product type* /      Patient  verified Yes     Visit #   Current / Total 4 21   Time   In / Out 2:44 3:21   Pain   In / Out 5 4   Subjective Functional Status/Changes: Pt reports increased pain, states that he has been walking more. TREATMENT AREA =  Primary osteoarthritis of left knee [M17.12]  Primary osteoarthritis of right knee [M17.11]    OBJECTIVE    Therapeutic Procedures: Tx Min Billable or 1:1 Min (if diff from Tx Min) Procedure, Rationale, Specifics   8 8 38033 Therapeutic Exercise (timed):  increase ROM, strength, coordination, balance, and proprioception to improve patient's ability to progress to PLOF and address remaining functional goals. (see flow sheet as applicable)     Details if applicable:       13 13 85490 Neuromuscular Re-Education (timed):  improve balance, coordination, kinesthetic sense, posture, core stability and proprioception to improve patient's ability to develop conscious control of individual muscles and awareness of position of extremities in order to progress to PLOF and address remaining functional goals. (see flow sheet as applicable)     Details if applicable:     16 16 90948 Therapeutic Activity (timed):  use of dynamic activities replicating functional movements to increase ROM, strength, coordination, balance, and proprioception in order to improve patient's ability to progress to PLOF and address remaining functional goals.   (see flow sheet as applicable)     Details if applicable:     37 40 [facility] Totals Reminder: bill using total billable min of TIMED therapeutic procedures (example: do not include dry needle or estim unattended, both untimed codes, in totals to left)  8-22 min = 1 unit; 23-37 min = 2 units; 38-52 min = 3

## 2023-09-26 ENCOUNTER — HOSPITAL ENCOUNTER (OUTPATIENT)
Facility: HOSPITAL | Age: 53
Setting detail: RECURRING SERIES
Discharge: HOME | End: 2023-09-29
Payer: COMMERCIAL

## 2023-09-26 PROCEDURE — 97112 NEUROMUSCULAR REEDUCATION: CPT

## 2023-09-26 PROCEDURE — 97110 THERAPEUTIC EXERCISES: CPT

## 2023-09-26 PROCEDURE — 97530 THERAPEUTIC ACTIVITIES: CPT

## 2023-09-26 NOTE — PROGRESS NOTES
PHYSICAL / OCCUPATIONAL THERAPY - DAILY TREATMENT NOTE (updated )    Patient Name: Gurpreet Pastor    Date: 2023    : 1970  Insurance: Payor: [id number] [email or web address] / Plan: [id number]  [employer] 1 / Product Type: *No Product type* /      Patient  verified Yes     Visit #   Current / Total 5 21   Time   In / Out 2:02 2:38   Pain   In / Out 5 2   Subjective Functional Status/Changes: Pt reports some increased muscle spasms in both thighs that leave some soreness. TREATMENT AREA =  Primary osteoarthritis of left knee [M17.12]  Primary osteoarthritis of right knee [M17.11]    OBJECTIVE    Therapeutic Procedures: Tx Min Billable or 1:1 Min (if diff from Tx Min) Procedure, Rationale, Specifics   9 9 [id number] Therapeutic Exercise (timed):  increase ROM, strength, coordination, balance, and proprioception to improve patient's ability to progress to PLOF and address remaining functional goals. (see flow sheet as applicable)     Details if applicable:       12 112 Neuromuscular Re-Education (timed):  improve balance, coordination, kinesthetic sense, posture, core stability and proprioception to improve patient's ability to develop conscious control of individual muscles and awareness of position of extremities in order to progress to PLOF and address remaining functional goals. (see flow sheet as applicable)     Details if applicable:     15 15 94759 Therapeutic Activity (timed):  use of dynamic activities replicating functional movements to increase ROM, strength, coordination, balance, and proprioception in order to improve patient's ability to progress to PLOF and address remaining functional goals.   (see flow sheet as applicable)     Details if applicable:     36 39 [facility] Totals Reminder: bill using total billable min of TIMED therapeutic procedures (example: do not include dry needle or estim unattended, both untimed codes, in totals to left)  8-22 min = 1 unit; 23-37 min = 2 units;

## 2023-09-28 ENCOUNTER — HOSPITAL ENCOUNTER (OUTPATIENT)
Facility: HOSPITAL | Age: 53
Setting detail: RECURRING SERIES
End: 2023-09-28
Payer: COMMERCIAL

## 2023-09-28 PROCEDURE — 97530 THERAPEUTIC ACTIVITIES: CPT

## 2023-09-28 PROCEDURE — 97112 NEUROMUSCULAR REEDUCATION: CPT

## 2023-09-28 PROCEDURE — 97110 THERAPEUTIC EXERCISES: CPT

## 2023-09-28 NOTE — PROGRESS NOTES
PHYSICAL THERAPY - DISCHARGE DAILY NOTE AND SUMMARY (updated )    Patient Name: Raul Cortez : 1970   Treatment/Medical Diagnosis: Primary osteoarthritis of left knee [M17.12]  Primary osteoarthritis of right knee [M17.11]   Referral Source: Zulay Barrios MD     Payor: Payor: [id number] [employer] / Plan: [id number] [employer] / Product Type: *No Product type* /            Reporting Period : 23 to 23    Date: 2023  Patient  verified Yes     Visit #   Current / Total 6 21   Time   In / Out 2:47 3:25   Pain   In / Out 3 - backs of legs 1-2   Subjective Functional Status/Changes: Pt reports some increased stiffness and pain in the backs of both legs today. TREATMENT AREA =  Primary osteoarthritis of left knee [M17.12]  Primary osteoarthritis of right knee [M17.11]    OBJECTIVE    Therapeutic Procedures: Tx Min Billable or 1:1 Min (if diff from Tx Min) Procedure, Rationale, Specifics   8  71211 Therapeutic Exercise (timed):  increase ROM, strength, coordination, balance, and proprioception to improve patient's ability to progress to PLOF and address remaining functional goals. (see flow sheet as applicable)      Details if applicable:     10  52116 Neuromuscular Re-Education (timed):  improve balance, coordination, kinesthetic sense, posture, core stability and proprioception to improve patient's ability to develop conscious control of individual muscles and awareness of position of extremities in order to progress to PLOF and address remaining functional goals. (see flow sheet as applicable)     Details if applicable:     16  03925 Therapeutic Activity (timed):  use of dynamic activities replicating functional movements to increase ROM, strength, coordination, balance, and proprioception in order to improve patient's ability to progress to PLOF and address remaining functional goals.   (see flow sheet as applicable)     Details if applicable:  includes

## 2023-09-28 NOTE — PROGRESS NOTES
Physical Therapy Discharge Instructions      [facility] - [place]  [address]   [phone] fax      Patient: Reshma Adams  : 1970      Continue Home Exercise Program 1 times per day for 4 weeks, then decrease to 4-5 times per week      Continue with    [] Ice  as needed 1-2 times per day     [x] Heat           Follow up with MD:     [] Upon completion of therapy     [x] As needed      Recommendations:     [x]   Return to activity with home program    []   Return to activity with the following modifications:       []Post Rehab Program    []Join Independent aquatic program     []Return to/join local gym        Additional Comments: it was a pleasure working with you, good luck!           [address] [place] [address] 2023 3:22 PM

## 2024-01-06 ENCOUNTER — APPOINTMENT (OUTPATIENT)
Facility: HOSPITAL | Age: 54
End: 2024-01-06
Payer: COMMERCIAL

## 2024-01-06 ENCOUNTER — HOSPITAL ENCOUNTER (EMERGENCY)
Facility: HOSPITAL | Age: 54
Discharge: HOME | End: 2024-01-06
Payer: COMMERCIAL

## 2024-01-06 VITALS
HEIGHT: 70 IN | WEIGHT: 170 LBS | HEART RATE: 60 BPM | RESPIRATION RATE: 16 BRPM | BODY MASS INDEX: 24.34 KG/M2 | OXYGEN SATURATION: 100 % | SYSTOLIC BLOOD PRESSURE: 120 MMHG | TEMPERATURE: 97.1 F | DIASTOLIC BLOOD PRESSURE: 90 MMHG

## 2024-01-06 DIAGNOSIS — S01.81XA FACIAL LACERATION, INITIAL ENCOUNTER: Primary | ICD-10-CM

## 2024-01-06 DIAGNOSIS — S09.90XA CLOSED HEAD INJURY, INITIAL ENCOUNTER: ICD-10-CM

## 2024-01-06 PROCEDURE — 12051 INTMD RPR FACE/MM 2.5 CM/<: CPT

## 2024-01-06 PROCEDURE — 72125 CT NECK SPINE W/O DYE: CPT

## 2024-01-06 PROCEDURE — 70450 CT HEAD/BRAIN W/O DYE: CPT

## 2024-01-06 PROCEDURE — 94761 N-INVAS EAR/PLS OXIMETRY MLT: CPT

## 2024-01-06 PROCEDURE — 99284 EMERGENCY DEPT VISIT MOD MDM: CPT

## 2024-01-06 ASSESSMENT — PAIN - FUNCTIONAL ASSESSMENT: PAIN_FUNCTIONAL_ASSESSMENT: NONE - DENIES PAIN

## 2024-01-06 ASSESSMENT — ENCOUNTER SYMPTOMS
ABDOMINAL PAIN: 0
SHORTNESS OF BREATH: 0
COUGH: 0
DIARRHEA: 0
EYE DISCHARGE: 0
NAUSEA: 0
SORE THROAT: 0

## 2024-01-06 NOTE — ED NOTES
D/c paperwork reviewed with patient, patient verbalized understanding. Pt left ED ambulatory and in stable condition.

## 2024-01-06 NOTE — ED PROVIDER NOTES
EMERGENCY DEPARTMENT HISTORY AND PHYSICAL EXAM    Date: 1/6/2024  Patient Name: Arnold Keys    History of Presenting Illness     Chief Complaint   Patient presents with    Alcohol Intoxication    Facial Laceration         History Provided By: Patient      Additional History (Context): Arnold Keys is a 53 y.o. male with a history of bradycardia presenting today for a fall that occurred prior to arrival.  Patient reports he had been drinking and was trying to get a ride home by his friend pork chop when he fell and struck his head.  Reports tetanus up-to-date.  Denies being on blood thinners or LOC.  Has no other complaints or concerns at this time      PCP: Kenney Hill MD    No current facility-administered medications for this encounter.     Current Outpatient Medications   Medication Sig Dispense Refill    Multiple Vitamins-Minerals (MULTIVITAMIN GUMMIES ADULT) CHEW Take 1 tablet by mouth daily 60 tablet 0    cephALEXin (KEFLEX) 500 MG capsule Take 1 capsule by mouth 4 times daily 28 capsule 0    ibuprofen (ADVIL;MOTRIN) 600 MG tablet Take 1 tablet by mouth 3 times daily as needed for Pain 30 tablet 0    hydrALAZINE (APRESOLINE) 50 MG tablet Take 1 tablet by mouth every 8 hours (Patient not taking: Reported on 5/21/2023) 90 tablet 0    amLODIPine (NORVASC) 10 MG tablet Take 1 tablet by mouth nightly (Patient not taking: Reported on 5/21/2023) 30 tablet 3    lisinopril (PRINIVIL;ZESTRIL) 10 MG tablet Take 1 tablet by mouth daily (Patient not taking: Reported on 5/21/2023) 90 tablet 1    acetaminophen (TYLENOL) 325 MG tablet Take 650 mg by mouth every 6 hours as needed for Pain or Fever (Patient not taking: Reported on 5/21/2023)         Past History     Past Medical History:  Past Medical History:   Diagnosis Date    Alcoholism (HCC)     Ill-defined condition     ETOH    Marijuana smoker        Past Surgical History:  No past surgical history on file.    Family History:  Family History   Problem  Relation Age of Onset    Diabetes type 2  Mother        Social History:  Social History     Tobacco Use    Smoking status: Former     Passive exposure: Never   Substance Use Topics    Alcohol use: Yes    Drug use: Yes     Types: Marijuana (Weed)       Allergies:  No Known Allergies      Review of Systems   Review of Systems   Constitutional:  Negative for chills and fever.   HENT:  Negative for congestion, sneezing and sore throat.    Eyes:  Negative for discharge.   Respiratory:  Negative for cough and shortness of breath.    Cardiovascular:  Negative for chest pain.   Gastrointestinal:  Negative for abdominal pain, diarrhea and nausea.   Genitourinary:  Negative for dysuria, frequency and urgency.   Musculoskeletal:  Negative for arthralgias.   Skin:  Positive for wound. Negative for rash.   Neurological:  Negative for syncope.   Psychiatric/Behavioral:  The patient is not nervous/anxious.    All other systems reviewed and are negative.    All Other Systems Negative  Physical Exam     Vitals:    01/06/24 1458   BP: 129/87   Pulse: 65   Resp: 16   Temp: 97.1 °F (36.2 °C)   TempSrc: Oral   SpO2: 100%   Weight: 77.1 kg (170 lb)   Height: 1.778 m (5' 10\")     Physical Exam  Vitals and nursing note reviewed.   Constitutional:       General: He is not in acute distress.     Appearance: He is not toxic-appearing.   HENT:      Head: Atraumatic.      Nose: Nose normal.      Mouth/Throat:      Mouth: Mucous membranes are moist.   Eyes:      Extraocular Movements: Extraocular movements intact.      Pupils: Pupils are equal, round, and reactive to light.   Cardiovascular:      Rate and Rhythm: Normal rate.   Pulmonary:      Effort: Pulmonary effort is normal.      Breath sounds: Normal breath sounds.   Abdominal:      General: There is no distension.      Tenderness: There is no abdominal tenderness.   Musculoskeletal:      Cervical back: Normal range of motion.      Right lower leg: No edema.      Left lower leg: No edema.

## 2024-01-06 NOTE — ED NOTES
Lac Repair    Date/Time: 1/6/2024 3:43 PM    Performed by: Kayy Cosby PA-C  Authorized by: Kayy Cosby PA-C    Consent:     Consent obtained:  Verbal    Consent given by:  Patient    Risks, benefits, and alternatives were discussed: yes      Risks discussed:  Pain and poor cosmetic result  Universal protocol:     Procedure explained and questions answered to patient or proxy's satisfaction: yes      Relevant documents present and verified: yes      Test results available: no      Imaging studies available: no      Required blood products, implants, devices, and special equipment available: yes      Immediately prior to procedure, a time out was called: yes      Patient identity confirmed:  Verbally with patient  Anesthesia:     Anesthesia method:  Local infiltration    Local anesthetic:  Lidocaine 1% w/o epi  Laceration details:     Location: forehead.    Length (cm):  2  Exploration:     Imaging outcome: foreign body not noted      Wound exploration: wound explored through full range of motion and entire depth of wound visualized      Wound extent: no fascia violation noted, no foreign bodies/material noted and no vascular damage noted      Contaminated: no    Treatment:     Area cleansed with:  Saline and chlorhexidine    Amount of cleaning:  Standard    Irrigation solution:  Sterile saline    Visualized foreign bodies/material removed: no      Debridement:  None    Undermining:  None    Scar revision: no    Skin repair:     Repair method:  Sutures    Suture size:  5-0    Suture material:  Nylon    Suture technique:  Simple interrupted    Number of sutures:  7  Approximation:     Approximation:  Close  Repair type:     Repair type:  Intermediate  Post-procedure details:     Dressing:  Adhesive bandage and antibiotic ointment    Procedure completion:  Tolerated well, no immediate complications    Kayy Cosby PA-C 3:44 PM      Kayy Cosby PA-C  01/06/24 1544

## 2024-01-06 NOTE — ED TRIAGE NOTES
Pt presets via EMS  after  falling [employer] outside and hit the curb. Pt reports he had a half pint of liquor today and smoke marijuana. EMS report no LOC. Pt arrives with laceration over left eyebrow

## 2024-01-12 ENCOUNTER — HOSPITAL ENCOUNTER (EMERGENCY)
Facility: HOSPITAL | Age: 54
Discharge: HOME | End: 2024-01-12

## 2024-01-12 VITALS
HEART RATE: 71 BPM | WEIGHT: 160 LBS | SYSTOLIC BLOOD PRESSURE: 115 MMHG | BODY MASS INDEX: 22.9 KG/M2 | DIASTOLIC BLOOD PRESSURE: 69 MMHG | TEMPERATURE: 98.7 F | HEIGHT: 70 IN | OXYGEN SATURATION: 99 % | RESPIRATION RATE: 18 BRPM

## 2024-01-12 DIAGNOSIS — Z48.02 VISIT FOR SUTURE REMOVAL: Primary | ICD-10-CM

## 2024-01-12 ASSESSMENT — ENCOUNTER SYMPTOMS
SHORTNESS OF BREATH: 0
COLOR CHANGE: 0
DIARRHEA: 0
ABDOMINAL PAIN: 0
RHINORRHEA: 0
VOMITING: 0

## 2024-01-12 ASSESSMENT — PAIN - FUNCTIONAL ASSESSMENT: PAIN_FUNCTIONAL_ASSESSMENT: NONE - DENIES PAIN

## 2024-01-12 NOTE — ED TRIAGE NOTES
Pt here for suture removal to left upper eyebrow, states they where placed 5 days ago. Crusty dried scab over site. Otherwise c.d,I.

## 2024-01-12 NOTE — ED PROVIDER NOTES
EMERGENCY DEPARTMENT HISTORY AND PHYSICAL EXAM      Date: 1/12/2024  Patient Name: Arnold Keys    History of Presenting Illness     Chief Complaint   Patient presents with    Suture / Staple Removal       History (Context): Arnold Keys is a 53 y.o. male with significant past medical history of etoh use presents ambulatory to the ED today.  Patient presents for suture removal of wound above left eyebrow.  Sutures were placed on 1/6.  Denies fever, chills, drainage.  He reports intermittent pain.  Patient has been applying Neosporin.      PCP: Kenney Hill MD    No current facility-administered medications for this encounter.     Current Outpatient Medications   Medication Sig Dispense Refill    Multiple Vitamins-Minerals (MULTIVITAMIN GUMMIES ADULT) CHEW Take 1 tablet by mouth daily 60 tablet 0    cephALEXin (KEFLEX) 500 MG capsule Take 1 capsule by mouth 4 times daily 28 capsule 0    ibuprofen (ADVIL;MOTRIN) 600 MG tablet Take 1 tablet by mouth 3 times daily as needed for Pain 30 tablet 0    hydrALAZINE (APRESOLINE) 50 MG tablet Take 1 tablet by mouth every 8 hours (Patient not taking: Reported on 5/21/2023) 90 tablet 0    amLODIPine (NORVASC) 10 MG tablet Take 1 tablet by mouth nightly (Patient not taking: Reported on 5/21/2023) 30 tablet 3    lisinopril (PRINIVIL;ZESTRIL) 10 MG tablet Take 1 tablet by mouth daily (Patient not taking: Reported on 5/21/2023) 90 tablet 1    acetaminophen (TYLENOL) 325 MG tablet Take 650 mg by mouth every 6 hours as needed for Pain or Fever (Patient not taking: Reported on 5/21/2023)         Past History     Past Medical History:   Past Medical History:   Diagnosis Date    Alcoholism (HCC)     Ill-defined condition     ETOH    Marijuana smoker        Past Surgical History:  No past surgical history on file.    Family History:  Family History   Problem Relation Age of Onset    Diabetes type 2  Mother        Social History:   Social History     Tobacco Use    Smoking

## 2024-01-27 ENCOUNTER — APPOINTMENT (OUTPATIENT)
Facility: HOSPITAL | Age: 54
End: 2024-01-27
Payer: COMMERCIAL

## 2024-01-27 ENCOUNTER — HOSPITAL ENCOUNTER (EMERGENCY)
Facility: HOSPITAL | Age: 54
Discharge: HOME | End: 2024-01-28
Attending: EMERGENCY MEDICINE
Payer: COMMERCIAL

## 2024-01-27 DIAGNOSIS — S01.01XA LACERATION OF SCALP, INITIAL ENCOUNTER: ICD-10-CM

## 2024-01-27 DIAGNOSIS — S00.03XA CONTUSION OF SCALP, INITIAL ENCOUNTER: Primary | ICD-10-CM

## 2024-01-27 PROCEDURE — 12002 RPR S/N/AX/GEN/TRNK2.6-7.5CM: CPT

## 2024-01-27 PROCEDURE — 99284 EMERGENCY DEPT VISIT MOD MDM: CPT

## 2024-01-27 PROCEDURE — 70450 CT HEAD/BRAIN W/O DYE: CPT

## 2024-01-27 PROCEDURE — 72125 CT NECK SPINE W/O DYE: CPT

## 2024-01-27 ASSESSMENT — PAIN SCALES - GENERAL: PAINLEVEL_OUTOF10: 7

## 2024-01-27 ASSESSMENT — PAIN - FUNCTIONAL ASSESSMENT: PAIN_FUNCTIONAL_ASSESSMENT: 0-10

## 2024-01-28 VITALS
HEIGHT: 70 IN | HEART RATE: 69 BPM | OXYGEN SATURATION: 99 % | DIASTOLIC BLOOD PRESSURE: 95 MMHG | TEMPERATURE: 97.9 F | WEIGHT: 167 LBS | BODY MASS INDEX: 23.91 KG/M2 | SYSTOLIC BLOOD PRESSURE: 146 MMHG | RESPIRATION RATE: 17 BRPM

## 2024-01-28 PROCEDURE — 6370000000 HC RX 637 (ALT 250 FOR IP): Performed by: EMERGENCY MEDICINE

## 2024-01-28 RX ORDER — BACITRACIN ZINC 500 [USP'U]/G
OINTMENT TOPICAL
Status: COMPLETED | OUTPATIENT
Start: 2024-01-28 | End: 2024-01-28

## 2024-01-28 RX ADMIN — Medication: at 02:44

## 2024-01-28 NOTE — ED NOTES
Laceration to back of head irrigated with wound cleanser, 50 ml of sterile water. Patient tolerated well.

## 2024-01-28 NOTE — ED NOTES
Patient received discharge instructions, left ED in stable condition. Bacitracin with non adhesive dressing applied to sutures on back of head; tolerated well. Patient left ED in stable condition. Ambulatory, steady gait noted.

## 2024-01-28 NOTE — ED PROVIDER NOTES
education: None    Highest education level: None   Tobacco Use    Smoking status: Former     Passive exposure: Never   Substance and Sexual Activity    Alcohol use: Yes    Drug use: Yes     Types: Marijuana (Weed)         PHYSICAL EXAM       ED Triage Vitals [01/27/24 2034]   BP Temp Temp Source Pulse Respirations SpO2 Height Weight - Scale   (!) 138/90 97.9 °F (36.6 °C) Oral 69 17 100 % 1.778 m (5' 10\") 75.8 kg (167 lb)       Physical Exam  HENT:      Head:      Comments: (+) 4 mc horizontal laceration over mid occipital area  Neurological:      Mental Status: He is alert.       No results found for this or any previous visit (from the past 24 hour(s)).          PROCEDURES:    E  EMERGENCY DEPARTMENT COURSE and DIFFERENTIALDIAGNOSIS/ MDM:   Vitals:    Vitals:    01/27/24 2034   BP: (!) 138/90   Pulse: 69   Resp: 17   Temp: 97.9 °F (36.6 °C)   TempSrc: Oral   SpO2: 100%   Weight: 75.8 kg (167 lb)   Height: 1.778 m (5' 10\")           CT HEAD WO CONTRAST   Final Result   1.  No acute intracranial process identified.      2.  No acute cervical spine fracture      3.  Multilevel degenerative changes of the cervical spine most pronounced at   C5-C6.      CT CERVICAL SPINE WO CONTRAST   Final Result   1.  No acute intracranial process identified.      2.  No acute cervical spine fracture      3.  Multilevel degenerative changes of the cervical spine most pronounced at   C5-C6.              11:22 PM Upon re-evaluation the patient's symptoms have improved. Pt has non-toxic appearance and condition is stable for discharge. Patient was informed of tests & results, instructed to f/u with PCP and return to the ED upon worsening of symptoms. All questions and concerns were addressed.       Lac Repair    Date/Time: 1/27/2024 11:28 PM    Performed by: Devyn Baldwin MD  Authorized by: Devyn Baldwin MD    Consent:     Consent obtained:  Verbal    Consent given by:  Patient    Risks, benefits, and alternatives were discussed:

## 2024-01-28 NOTE — DISCHARGE INSTRUCTIONS
Staples removal in 8 days.  eeeeeeeeeeeeeeeeeeeeeeeeeeeeeeeeeeeeeeeeeeeeeeeeeeeeeeeeeeeeeeeeeeeeeeeeeeeeeeeeeeeeeeeeeeeeeeeeeeeeeeeeeeeeeeeeeeeeeeeeeeeeeeeeeeeeeeeeeeeeeeeeeeeeeeeeeeeeeeeeeeeeeeeeeeeeeeeeeeeeeeeeeeeeeeeeeeeeeeeeeeeeeeeeeeeeeeeeeeeeeeeeeeeeeeee

## 2024-01-28 NOTE — ED NOTES
Patient continue to rest comfortably on stretcher, symmetrical chest rise and fall noted. Patient connected to B/P, O2 saturation machine. VSS. Patient to be remain in ED until morning for observation. No acute distress noted.

## 2024-01-28 NOTE — ED NOTES
Staples applied to back of patient's head by provider, patient tolerated procedure well. As per provider, will allow patient to rest, connected to B/P/ O2 saturation machine. No acute distress noted.

## 2024-01-28 NOTE — ED TRIAGE NOTES
Patient reports he tripped and fell. States he hit his head on a glass table and broke it 30 minutes ago. Arrives with 6-7cm laceration to the back of his head. Denies LOC. +ETOH

## 2024-02-05 ENCOUNTER — HOSPITAL ENCOUNTER (EMERGENCY)
Facility: HOSPITAL | Age: 54
Discharge: HOME | End: 2024-02-05
Attending: EMERGENCY MEDICINE

## 2024-02-05 VITALS
RESPIRATION RATE: 20 BRPM | HEART RATE: 55 BPM | OXYGEN SATURATION: 99 % | HEIGHT: 70 IN | DIASTOLIC BLOOD PRESSURE: 76 MMHG | BODY MASS INDEX: 23.62 KG/M2 | TEMPERATURE: 98.1 F | SYSTOLIC BLOOD PRESSURE: 121 MMHG | WEIGHT: 165 LBS

## 2024-02-05 DIAGNOSIS — Z48.02 ENCOUNTER FOR STAPLE REMOVAL: Primary | ICD-10-CM

## 2024-02-05 ASSESSMENT — PAIN SCALES - GENERAL
PAINLEVEL_OUTOF10: 0
PAINLEVEL_OUTOF10: 0

## 2024-02-05 ASSESSMENT — PAIN - FUNCTIONAL ASSESSMENT
PAIN_FUNCTIONAL_ASSESSMENT: 0-10
PAIN_FUNCTIONAL_ASSESSMENT: 0-10

## 2024-02-05 NOTE — ED PROVIDER NOTES
Clinician of Record.    FINAL IMPRESSION      1. Encounter for staple removal        DISPOSITION/PLAN   DISPOSITION Decision To Discharge 02/05/2024 10:07:08 AM      PATIENT REFERRED TO:  Kenney Hill MD  [address] [place] [id number]  [phone]    Schedule an appointment as soon as possible for a visit   As needed      DISCHARGE MEDICATIONS:  New Prescriptions    No medications on file       DISCONTINUED MEDICATIONS:  Discontinued Medications    CEPHALEXIN (KEFLEX) 500 MG CAPSULE    Take 1 capsule by mouth 4 times daily              (Please note that portions of this note were completed with a voice recognition program.  Efforts were made to edit the dictations but occasionally words aremis-transcribed.)    Eric Milan MD (electronically signed)           Eric Milan MD  02/05/24 1001

## 2024-02-20 ENCOUNTER — HOSPITAL ENCOUNTER (EMERGENCY)
Facility: HOSPITAL | Age: 54
Discharge: HOME | End: 2024-02-20
Attending: STUDENT IN AN ORGANIZED HEALTH CARE EDUCATION/TRAINING PROGRAM
Payer: COMMERCIAL

## 2024-02-20 VITALS
TEMPERATURE: 98.1 F | HEART RATE: 56 BPM | DIASTOLIC BLOOD PRESSURE: 93 MMHG | RESPIRATION RATE: 15 BRPM | OXYGEN SATURATION: 100 % | SYSTOLIC BLOOD PRESSURE: 135 MMHG

## 2024-02-20 DIAGNOSIS — R00.1 BRADYCARDIA: ICD-10-CM

## 2024-02-20 DIAGNOSIS — I10 ESSENTIAL HYPERTENSION: ICD-10-CM

## 2024-02-20 DIAGNOSIS — T68.XXXA HYPOTHERMIA DUE TO EXPOSURE: Primary | ICD-10-CM

## 2024-02-20 LAB
ANION GAP SERPL CALC-SCNC: 15 MMOL/L (ref 3–18)
BASOPHILS # BLD: 0 K/UL (ref 0–0.1)
BASOPHILS NFR BLD: 1 % (ref 0–2)
BUN SERPL-MCNC: 13 MG/DL (ref 7–18)
BUN/CREAT SERPL: 15 (ref 12–20)
CALCIUM SERPL-MCNC: 9.3 MG/DL (ref 8.5–10.1)
CHLORIDE SERPL-SCNC: 106 MMOL/L (ref 100–111)
CO2 SERPL-SCNC: 19 MMOL/L (ref 21–32)
CREAT SERPL-MCNC: 0.86 MG/DL (ref 0.6–1.3)
DIFFERENTIAL METHOD BLD: ABNORMAL
EKG ATRIAL RATE: 42 BPM
EKG DIAGNOSIS: NORMAL
EKG P AXIS: 65 DEGREES
EKG P-R INTERVAL: 152 MS
EKG Q-T INTERVAL: 528 MS
EKG QRS DURATION: 108 MS
EKG QTC CALCULATION (BAZETT): 440 MS
EKG R AXIS: 67 DEGREES
EKG T AXIS: 65 DEGREES
EKG VENTRICULAR RATE: 42 BPM
EOSINOPHIL # BLD: 0.3 K/UL (ref 0–0.4)
EOSINOPHIL NFR BLD: 4 % (ref 0–5)
ERYTHROCYTE [DISTWIDTH] IN BLOOD BY AUTOMATED COUNT: 13.8 % (ref 11.6–14.5)
GLUCOSE BLD STRIP.AUTO-MCNC: 160 MG/DL (ref 70–110)
GLUCOSE BLD STRIP.AUTO-MCNC: 64 MG/DL (ref 70–110)
GLUCOSE SERPL-MCNC: 55 MG/DL (ref 74–99)
HCT VFR BLD AUTO: 42.5 % (ref 36–48)
HGB BLD-MCNC: 13.6 G/DL (ref 13–16)
IMM GRANULOCYTES # BLD AUTO: 0 K/UL (ref 0–0.04)
IMM GRANULOCYTES NFR BLD AUTO: 0 % (ref 0–0.5)
LYMPHOCYTES # BLD: 2.3 K/UL (ref 0.9–3.6)
LYMPHOCYTES NFR BLD: 28 % (ref 21–52)
MCH RBC QN AUTO: 33.5 PG (ref 24–34)
MCHC RBC AUTO-ENTMCNC: 32 G/DL (ref 31–37)
MCV RBC AUTO: 104.7 FL (ref 78–100)
MONOCYTES # BLD: 0.4 K/UL (ref 0.05–1.2)
MONOCYTES NFR BLD: 5 % (ref 3–10)
NEUTS SEG # BLD: 5 K/UL (ref 1.8–8)
NEUTS SEG NFR BLD: 62 % (ref 40–73)
NRBC # BLD: 0 K/UL (ref 0–0.01)
NRBC BLD-RTO: 0 PER 100 WBC
PLATELET # BLD AUTO: 164 K/UL (ref 135–420)
PMV BLD AUTO: 12 FL (ref 9.2–11.8)
POTASSIUM SERPL-SCNC: 4.3 MMOL/L (ref 3.5–5.5)
RBC # BLD AUTO: 4.06 M/UL (ref 4.35–5.65)
SODIUM SERPL-SCNC: 140 MMOL/L (ref 136–145)
WBC # BLD AUTO: 8 K/UL (ref 4.6–13.2)

## 2024-02-20 PROCEDURE — 85025 COMPLETE CBC W/AUTO DIFF WBC: CPT

## 2024-02-20 PROCEDURE — 80048 BASIC METABOLIC PNL TOTAL CA: CPT

## 2024-02-20 PROCEDURE — 94761 N-INVAS EAR/PLS OXIMETRY MLT: CPT

## 2024-02-20 PROCEDURE — 82962 GLUCOSE BLOOD TEST: CPT

## 2024-02-20 PROCEDURE — 99284 EMERGENCY DEPT VISIT MOD MDM: CPT

## 2024-02-20 PROCEDURE — 93010 ELECTROCARDIOGRAM REPORT: CPT | Performed by: INTERNAL MEDICINE

## 2024-02-20 PROCEDURE — 93005 ELECTROCARDIOGRAM TRACING: CPT | Performed by: STUDENT IN AN ORGANIZED HEALTH CARE EDUCATION/TRAINING PROGRAM

## 2024-02-20 ASSESSMENT — PAIN - FUNCTIONAL ASSESSMENT: PAIN_FUNCTIONAL_ASSESSMENT: NONE - DENIES PAIN

## 2024-02-20 NOTE — ED PROVIDER NOTES
EMERGENCY DEPARTMENT HISTORY AND PHYSICAL EXAM    6:39 AM EST        Date: 2/20/2024  Patient Name: Arnold Keys    History of Presenting Illness     Chief Complaint   Patient presents with   • Dizziness         History Provided By: patient    Additional History (Context): Arnold Keys is a 53 y.o. male presents with lightheadedness.  Patient is homeless and sleeps outside.  He states that EMS found him and brought him here.  Yesterday he felt normal with no cough, fever, chills, nausea, vomiting.  He does endorse drinking alcohol, gin, most days.  Last drink was approximately 4 to 5 hours prior to arrival.  Patient denies cardiac or respiratory problems.    PCP: Kenney Hill MD        No current facility-administered medications for this encounter.     Current Outpatient Medications   Medication Sig Dispense Refill   • Multiple Vitamins-Minerals (MULTIVITAMIN GUMMIES ADULT) CHEW Take 1 tablet by mouth daily 60 tablet 0   • ibuprofen (ADVIL;MOTRIN) 600 MG tablet Take 1 tablet by mouth 3 times daily as needed for Pain 30 tablet 0   • hydrALAZINE (APRESOLINE) 50 MG tablet Take 1 tablet by mouth every 8 hours (Patient not taking: Reported on 5/21/2023) 90 tablet 0   • amLODIPine (NORVASC) 10 MG tablet Take 1 tablet by mouth nightly (Patient not taking: Reported on 5/21/2023) 30 tablet 3   • lisinopril (PRINIVIL;ZESTRIL) 10 MG tablet Take 1 tablet by mouth daily (Patient not taking: Reported on 5/21/2023) 90 tablet 1   • acetaminophen (TYLENOL) 325 MG tablet Take 650 mg by mouth every 6 hours as needed for Pain or Fever (Patient not taking: Reported on 5/21/2023)         Past History     Past Medical History:  Past Medical History:   Diagnosis Date   • Alcoholism (HCC)    • Ill-defined condition     ETOH   • Marijuana smoker        Past Surgical History:  No past surgical history on file.    Family History:  Family History   Problem Relation Age of Onset   • Diabetes type 2  Mother        Social

## 2024-02-20 NOTE — DISCHARGE INSTRUCTIONS
You were brought to the emergency department due to concerns of being lightheaded and dizzy.  This is likely due to to the fact that your temperature was very low possibly due to consuming alcohol and being stuck outside for prolonged period of time.  When you drink alcohol it prevents your body from being able to warm itself appropriately.  If you feel like your symptoms are returning or you notice any new symptoms that you find concerning please come back to the emergency department right away.

## 2024-02-20 NOTE — PROGRESS NOTES
completed the initial Spiritual Assessment of the patient, and offered Pastoral Care, support to the patient in bed 16 of the emergency room. There is no advance directive on file. Patient does not have any Rastafarian/cultural needs that will affect patient’s preferences in health care. Chaplains will continue to follow and will provide pastoral care on an as needed/requested basis.    Chaplain Anthony Rose  Board Certified   Spiritual Care Department  [phone]

## 2024-02-20 NOTE — ED NOTES
Last temp was 98.9 F. Pt is warm. BGL went up to 160 from 64 after pt ate breakfast. Pt states he feels great and has transportation arranged. Two Ivs removed and discharge instructions given.

## 2024-02-20 NOTE — ED TRIAGE NOTES
Patient c/o dizziness x 2hrs, states he was lightheaded and fell, denies LOC, denies blood thinners  Patient called ems from hotel parking lot, states he had been outside all day  Hx - htn, drinks 1 pint alcohol daily  Patient diaphoretic, HR 41, unable to obtain oral temp - Dr Sharif notified and at bedside

## 2024-02-20 NOTE — ED NOTES
Pt is lying under bear hugger. Temperature increasing steadily. Pt AxOx4 with good mentation. No delays.

## 2024-03-11 ENCOUNTER — HOSPITAL ENCOUNTER (EMERGENCY)
Facility: HOSPITAL | Age: 54
Discharge: HOME | End: 2024-03-12
Attending: EMERGENCY MEDICINE
Payer: COMMERCIAL

## 2024-03-11 DIAGNOSIS — S82.402A CLOSED FRACTURE OF LEFT TIBIA AND FIBULA, INITIAL ENCOUNTER: Primary | ICD-10-CM

## 2024-03-11 DIAGNOSIS — S82.202A CLOSED FRACTURE OF LEFT TIBIA AND FIBULA, INITIAL ENCOUNTER: Primary | ICD-10-CM

## 2024-03-11 PROCEDURE — 99283 EMERGENCY DEPT VISIT LOW MDM: CPT

## 2024-03-12 ENCOUNTER — APPOINTMENT (OUTPATIENT)
Facility: HOSPITAL | Age: 54
End: 2024-03-12
Payer: COMMERCIAL

## 2024-03-12 VITALS
TEMPERATURE: 98.2 F | DIASTOLIC BLOOD PRESSURE: 91 MMHG | HEIGHT: 70 IN | WEIGHT: 168 LBS | HEART RATE: 55 BPM | BODY MASS INDEX: 24.05 KG/M2 | RESPIRATION RATE: 16 BRPM | SYSTOLIC BLOOD PRESSURE: 158 MMHG | OXYGEN SATURATION: 99 %

## 2024-03-12 PROCEDURE — 73562 X-RAY EXAM OF KNEE 3: CPT

## 2024-03-12 PROCEDURE — 29515 APPLICATION SHORT LEG SPLINT: CPT

## 2024-03-12 PROCEDURE — 73610 X-RAY EXAM OF ANKLE: CPT

## 2024-03-12 RX ORDER — NAPROXEN 250 MG/1
250 TABLET ORAL 2 TIMES DAILY WITH MEALS
Qty: 180 TABLET | Refills: 1 | Status: SHIPPED | OUTPATIENT
Start: 2024-03-12

## 2024-03-12 ASSESSMENT — ENCOUNTER SYMPTOMS
SHORTNESS OF BREATH: 0
ABDOMINAL PAIN: 0

## 2024-03-12 NOTE — ED PROVIDER NOTES
EMERGENCY DEPARTMENT HISTORY AND PHYSICAL EXAM    2:13 AM      Date: 3/11/2024  Patient Name: Arnold Keys    History of Presenting Illness     Chief Complaint   Patient presents with    Leg Pain         History Provided By: the patient.     Additional History (Context): Arnold Keys is a 53 y.o. male with   Past Medical History:   Diagnosis Date    Alcoholism (HCC)     Ill-defined condition     ETOH    Marijuana smoker     who presents with left knee and ankle pain.  Patient states he was drinking alcohol with some friends yesterday around 1100 when he lost balance and fell on his left side.  Patient was able to control the fall.  No LOC.  No head injury.  Today, patient states his ankle hurting worse.  Patient did not try anything to relieve the symptoms.  Clarify triage note, patient states that nurse did not bring him a urinal and was not able to walk to the bathroom, therefore he urinated on the floor.  Patient reports last drink to be yesterday at 1100.  Denies any other symptoms such as chest pain, shortness of breath, hallucinations, tremors, nausea, vomiting, or any other symptoms.    PCP: Kenney Hill MD    No current facility-administered medications for this encounter.     Current Outpatient Medications   Medication Sig Dispense Refill    Multiple Vitamins-Minerals (MULTIVITAMIN GUMMIES ADULT) CHEW Take 1 tablet by mouth daily 60 tablet 0    ibuprofen (ADVIL;MOTRIN) 600 MG tablet Take 1 tablet by mouth 3 times daily as needed for Pain 30 tablet 0    hydrALAZINE (APRESOLINE) 50 MG tablet Take 1 tablet by mouth every 8 hours (Patient not taking: Reported on 5/21/2023) 90 tablet 0    amLODIPine (NORVASC) 10 MG tablet Take 1 tablet by mouth nightly (Patient not taking: Reported on 5/21/2023) 30 tablet 3    lisinopril (PRINIVIL;ZESTRIL) 10 MG tablet Take 1 tablet by mouth daily (Patient not taking: Reported on 5/21/2023) 90 tablet 1    acetaminophen (TYLENOL) 325 MG tablet Take 650 mg by mouth

## 2024-03-12 NOTE — ED TRIAGE NOTES
Patient complains of left leg pain secondary to a fall on Sunday. Patient laid himself on the floor and urinated on the floor.

## 2024-03-14 NOTE — PROGRESS NOTES
Patient: Arnold Keys                MRN: [id number]       SSN: [id number]  YOB: 1970        AGE: 53 y.o.        SEX: male      PCP: Kenney Hill MD  03/18/24    Chief Complaint   Patient presents with    Ankle Pain     left    Knee Pain     left     HISTORY:  Arnold Keys is a 53 y.o. male who is seen for left knee and ankle injuries. He fell into a barely noticeable hole on public property at the corner of [facility] in [address] across from the [address] on 3/10/2024. He was seen at [facility] ED on 3/11/24 where he was told by Dr. Knight and Dr. Elias he had a lateral malleolus fracture.   He was placed in a short leg splint and provided crutches.     He was previously seen for bilateral knee injuries.  Fell at 2:30 AM  at home.  He states that his legs just gave way on him.  He struck his right knee on the edge of a table.  He feels cramping in his knees when he wakes in the morning.  He feels pain and instability while standing, walking and stair climbing.  He experiences startup pain after sitting.      He has a history of chronic lower back pain.     He has a h/o gout.      Occupation, etc: Mr. Keys is currently unemployed. He is applying for social security disability benefits for his knees and back. He has been working under the table for his previous job about 30 hours a week. He previously worked as a  for [employer].  His job required a lot of heavy pushing pulling and carrying.  He previously lived with his sister and brother in law in [place] but he now resides in the [facility] homeless shelter in [place]. He has no children or pets.   Wt Readings from Last 3 Encounters:   03/18/24 76.2 kg (168 lb)   03/12/24 76.2 kg (168 lb)   02/05/24 74.8 kg (165 lb)      Body mass index is 24.11 kg/m².    Patient Active Problem List   Diagnosis    Sepsis (HCC)    Hypothermia    Bradycardia    Other chest pain    Lactic acidosis    Hypoglycemia

## 2024-03-18 ENCOUNTER — OFFICE VISIT (OUTPATIENT)
Age: 54
End: 2024-03-18
Payer: COMMERCIAL

## 2024-03-18 VITALS — TEMPERATURE: 97.1 F | BODY MASS INDEX: 24.05 KG/M2 | WEIGHT: 168 LBS | HEIGHT: 70 IN

## 2024-03-18 DIAGNOSIS — S82.832A CLOSED AVULSION FRACTURE OF DISTAL END OF LEFT FIBULA, INITIAL ENCOUNTER: Primary | ICD-10-CM

## 2024-03-18 PROCEDURE — 27786 TREATMENT OF ANKLE FRACTURE: CPT | Performed by: SPECIALIST

## 2024-03-18 PROCEDURE — 99213 OFFICE O/P EST LOW 20 MIN: CPT | Performed by: SPECIALIST

## 2024-03-20 ENCOUNTER — HOSPITAL ENCOUNTER (EMERGENCY)
Facility: HOSPITAL | Age: 54
Discharge: HOME | End: 2024-03-20
Payer: COMMERCIAL

## 2024-03-20 VITALS
OXYGEN SATURATION: 97 % | HEIGHT: 70 IN | WEIGHT: 168 LBS | DIASTOLIC BLOOD PRESSURE: 82 MMHG | BODY MASS INDEX: 24.05 KG/M2 | SYSTOLIC BLOOD PRESSURE: 128 MMHG | HEART RATE: 77 BPM | TEMPERATURE: 97.3 F | RESPIRATION RATE: 16 BRPM

## 2024-03-20 DIAGNOSIS — Z87.81 HISTORY OF ANKLE FRACTURE: ICD-10-CM

## 2024-03-20 DIAGNOSIS — Z99.89 CRUTCHES AS AMBULATION AID: Primary | ICD-10-CM

## 2024-03-20 PROCEDURE — 99283 EMERGENCY DEPT VISIT LOW MDM: CPT

## 2024-03-20 ASSESSMENT — ENCOUNTER SYMPTOMS
EYE REDNESS: 0
NAUSEA: 0
COUGH: 0
CONSTIPATION: 0
VOMITING: 0
BACK PAIN: 0
EYE DISCHARGE: 0
WHEEZING: 0
RHINORRHEA: 0
SORE THROAT: 0
DIARRHEA: 0
ABDOMINAL PAIN: 0
STRIDOR: 0

## 2024-03-20 ASSESSMENT — PAIN SCALES - GENERAL: PAINLEVEL_OUTOF10: 0

## 2024-03-20 ASSESSMENT — PAIN - FUNCTIONAL ASSESSMENT: PAIN_FUNCTIONAL_ASSESSMENT: 0-10
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
## 2024-03-20 NOTE — ED NOTES
Pt is being D/C. D/C instructions gone over with pt, pt verbalized understanding. No further questions or concerns.     Pt ambulatory with crutches, A&Ox4. NAD voiced or noted.

## 2024-04-01 ENCOUNTER — OFFICE VISIT (OUTPATIENT)
Age: 54
End: 2024-04-01
Payer: COMMERCIAL

## 2024-04-01 DIAGNOSIS — S82.832A CLOSED AVULSION FRACTURE OF DISTAL END OF LEFT FIBULA, INITIAL ENCOUNTER: Primary | ICD-10-CM

## 2024-04-01 PROCEDURE — 99024 POSTOP FOLLOW-UP VISIT: CPT | Performed by: PHYSICIAN ASSISTANT

## 2024-04-01 PROCEDURE — 73610 X-RAY EXAM OF ANKLE: CPT | Performed by: PHYSICIAN ASSISTANT

## 2024-04-01 NOTE — PROGRESS NOTES
Patient: Arnold Keys                MRN: [id number]       SSN: [id number]  YOB: 1970        AGE: 53 y.o.        SEX: male      PCP: Kenney Hill MD  04/01/24 4/1/2024: Patient returns the office for cast removal and reimaging secondary to a fall which resulted in the role avulsion fracture of the left ankle.  Patient using crutches and was observed for weightbearing left lower extremity.    HISTORY:  Arnold Keys is a 53 y.o. male who is seen for left knee and ankle injuries. He fell into a barely noticeable hole on public property at the corner of [facility] in [address] across from the [address] on 3/10/2024. He was seen at [facility] ED on 3/11/24 where he was told by Dr. Knight and Dr. Elias he had a lateral malleolus fracture.   He was placed in a short leg splint and provided crutches.     He was previously seen for bilateral knee injuries.  Fell at 2:30 AM  at home.  He states that his legs just gave way on him.  He struck his right knee on the edge of a table.  He feels cramping in his knees when he wakes in the morning.  He feels pain and instability while standing, walking and stair climbing.  He experiences startup pain after sitting.      He has a history of chronic lower back pain.     He has a h/o gout.      Occupation, etc: Mr. Keys is currently unemployed. He is applying for social security disability benefits for his knees and back. He has been working under the table for his previous job about 30 hours a week. He previously worked as a  for [employer].  His job required a lot of heavy pushing pulling and carrying.  He previously lived with his sister and brother in law in [place] but he now resides in the [facility] homeless shelter in [place]. He has no children or pets.   Wt Readings from Last 3 Encounters:   03/20/24 76.2 kg (168 lb)   03/18/24 76.2 kg (168 lb)   03/12/24 76.2 kg (168 lb)      There is no height or weight

## 2024-04-11 ENCOUNTER — HOSPITAL ENCOUNTER (EMERGENCY)
Facility: HOSPITAL | Age: 54
Discharge: HOME | End: 2024-04-12
Payer: COMMERCIAL

## 2024-04-11 ENCOUNTER — APPOINTMENT (OUTPATIENT)
Facility: HOSPITAL | Age: 54
End: 2024-04-11
Payer: COMMERCIAL

## 2024-04-11 DIAGNOSIS — K02.9 PAIN DUE TO DENTAL CARIES: Primary | ICD-10-CM

## 2024-04-11 PROCEDURE — 99283 EMERGENCY DEPT VISIT LOW MDM: CPT

## 2024-04-12 VITALS
TEMPERATURE: 97.7 F | WEIGHT: 168 LBS | DIASTOLIC BLOOD PRESSURE: 70 MMHG | OXYGEN SATURATION: 98 % | HEART RATE: 68 BPM | HEIGHT: 70 IN | SYSTOLIC BLOOD PRESSURE: 164 MMHG | BODY MASS INDEX: 24.05 KG/M2 | RESPIRATION RATE: 18 BRPM

## 2024-04-12 RX ORDER — AMOXICILLIN AND CLAVULANATE POTASSIUM 875; 125 MG/1; MG/1
1 TABLET, FILM COATED ORAL 2 TIMES DAILY
Qty: 20 TABLET | Refills: 0 | Status: SHIPPED | OUTPATIENT
Start: 2024-04-12 | End: 2024-04-22

## 2024-04-12 NOTE — ED NOTES
Pt states he is here for Lt upper dental pain.  
(Preliminary)        Procedures     Procedures      ED Course     12:12 AM EDT I (Baetriz Corey DNP, ENP-C, FNP-C) am the first provider for this patient. Initial assessment performed. I reviewed the vital signs, available nursing notes, past medical history, past surgical history, family history and social history. The patients presenting problems have been discussed, and they are in agreement with the care plan formulated and outlined with them.  I have encouraged them to ask questions as they arise throughout their visit.    Records Reviewed: Nursing Notes    Is this patient to be included in the SEP-1 core measure? No Exclusion criteria - the patient is NOT to be included for SEP-1 Core Measure due to: Infection is not suspected    MEDICATIONS ADMINISTERED IN THE ED:  Medications - No data to display         Medical Decision Making     MDM  Number of Diagnoses or Management Options  Diagnosis management comments: Patient seen after he finally presented back from sleeping in the waiting room.  Patient will be treated with Augmentin for tooth ache referred to dental.  Patient denied any problems with his fracture of his left ankle.  Patient states he goes to his orthopedic for that.    Risk of Complications, Morbidity, and/or Mortality  Presenting problems: low  Diagnostic procedures: low  Management options: low    Patient Progress  Patient progress: stable                Diagnosis and Disposition     DIAGNOSIS:   1. Pain due to dental caries        DISPOSITION Decision To Discharge 04/12/2024 12:13:26 AM      PATIENT REFERRED TO:  No follow-up provider specified.    DISCHARGE MEDICATIONS:  New Prescriptions    AMOXICILLIN-CLAVULANATE (AUGMENTIN) 875-125 MG PER TABLET    Take 1 tablet by mouth 2 times daily for 10 days       DISCONTINUED MEDICATIONS:  Discontinued Medications    No medications on file              (Please note that portions of this note were completed with a voice recognition program.  Efforts

## 2024-04-15 ENCOUNTER — OFFICE VISIT (OUTPATIENT)
Age: 54
End: 2024-04-15
Payer: COMMERCIAL

## 2024-04-15 VITALS — WEIGHT: 158 LBS | BODY MASS INDEX: 22.62 KG/M2 | TEMPERATURE: 97.5 F | HEIGHT: 70 IN

## 2024-04-15 DIAGNOSIS — S82.832A CLOSED AVULSION FRACTURE OF DISTAL END OF LEFT FIBULA, INITIAL ENCOUNTER: Primary | ICD-10-CM

## 2024-04-15 PROCEDURE — 73610 X-RAY EXAM OF ANKLE: CPT | Performed by: PHYSICIAN ASSISTANT

## 2024-04-15 PROCEDURE — 99024 POSTOP FOLLOW-UP VISIT: CPT | Performed by: PHYSICIAN ASSISTANT

## 2024-04-15 NOTE — PROGRESS NOTES
Patient: Arnold Keys                MRN: [id number]       SSN: [id number]  YOB: 1970        AGE: 53 y.o.        SEX: male      PCP: Kenney Hill MD  04/15/24    4/15/2024: Patient returns the office for cast removal and reimaging of his left ankle nonsurgical avulsion fracture.  He is using 2 crutches for ambulation assistance nonweightbearing to left lower extremity.    4/1/2024: Patient returns the office for cast removal and reimaging secondary to a fall which resulted in the role avulsion fracture of the left ankle.  Patient using crutches and was observed for weightbearing left lower extremity.    HISTORY:  Arnold Keys is a 53 y.o. male who is seen for left knee and ankle injuries. He fell into a barely noticeable hole on public property at the corner of [facility] in [address] across from the [address] on 3/10/2024. He was seen at [facility] ED on 3/11/24 where he was told by Dr. Knight and Dr. Elias he had a lateral malleolus fracture.   He was placed in a short leg splint and provided crutches.     He was previously seen for bilateral knee injuries.  Fell at 2:30 AM  at home.  He states that his legs just gave way on him.  He struck his right knee on the edge of a table.  He feels cramping in his knees when he wakes in the morning.  He feels pain and instability while standing, walking and stair climbing.  He experiences startup pain after sitting.      He has a history of chronic lower back pain.     He has a h/o gout.      Occupation, etc: Mr. Keys is currently unemployed. He is applying for social security disability benefits for his knees and back. He has been working under the table for his previous job about 30 hours a week. He previously worked as a  for [employer].  His job required a lot of heavy pushing pulling and carrying.  He previously lived with his sister and brother in law in [place] but he now resides in the [place]

## 2024-05-17 ENCOUNTER — APPOINTMENT (OUTPATIENT)
Facility: HOSPITAL | Age: 54
End: 2024-05-17
Payer: COMMERCIAL

## 2024-05-17 ENCOUNTER — HOSPITAL ENCOUNTER (EMERGENCY)
Facility: HOSPITAL | Age: 54
Discharge: HOME | End: 2024-05-18
Attending: STUDENT IN AN ORGANIZED HEALTH CARE EDUCATION/TRAINING PROGRAM
Payer: COMMERCIAL

## 2024-05-17 VITALS
WEIGHT: 150 LBS | TEMPERATURE: 97.4 F | OXYGEN SATURATION: 97 % | SYSTOLIC BLOOD PRESSURE: 105 MMHG | HEART RATE: 57 BPM | HEIGHT: 70 IN | BODY MASS INDEX: 21.47 KG/M2 | RESPIRATION RATE: 22 BRPM | DIASTOLIC BLOOD PRESSURE: 60 MMHG
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
## 2024-05-17 DIAGNOSIS — F10.920 ACUTE ALCOHOLIC INTOXICATION WITHOUT COMPLICATION (HCC): Primary | ICD-10-CM

## 2024-05-17 LAB
ALBUMIN SERPL-MCNC: 3.6 G/DL (ref 3.4–5)
ALBUMIN/GLOB SERPL: 0.9 (ref 0.8–1.7)
ALP SERPL-CCNC: 70 U/L (ref 45–117)
ALT SERPL-CCNC: 24 U/L (ref 16–61)
ANION GAP SERPL CALC-SCNC: 7 MMOL/L (ref 3–18)
AST SERPL-CCNC: 25 U/L (ref 10–38)
BASOPHILS # BLD: 0 K/UL (ref 0–0.1)
BASOPHILS NFR BLD: 1 % (ref 0–2)
BILIRUB SERPL-MCNC: 0.4 MG/DL (ref 0.2–1)
BUN SERPL-MCNC: 15 MG/DL (ref 7–18)
BUN/CREAT SERPL: 16 (ref 12–20)
CALCIUM SERPL-MCNC: 9 MG/DL (ref 8.5–10.1)
CHLORIDE SERPL-SCNC: 111 MMOL/L (ref 100–111)
CO2 SERPL-SCNC: 25 MMOL/L (ref 21–32)
CREAT SERPL-MCNC: 0.93 MG/DL (ref 0.6–1.3)
DIFFERENTIAL METHOD BLD: ABNORMAL
EOSINOPHIL # BLD: 0.3 K/UL (ref 0–0.4)
EOSINOPHIL NFR BLD: 5 % (ref 0–5)
ERYTHROCYTE [DISTWIDTH] IN BLOOD BY AUTOMATED COUNT: 13.6 % (ref 11.6–14.5)
ETHANOL SERPL-MCNC: 299 MG/DL (ref 0–3)
GLOBULIN SER CALC-MCNC: 3.8 G/DL (ref 2–4)
GLUCOSE SERPL-MCNC: 85 MG/DL (ref 74–99)
HCT VFR BLD AUTO: 32.3 % (ref 36–48)
HGB BLD-MCNC: 10.9 G/DL (ref 13–16)
IMM GRANULOCYTES # BLD AUTO: 0 K/UL (ref 0–0.04)
IMM GRANULOCYTES NFR BLD AUTO: 0 % (ref 0–0.5)
LYMPHOCYTES # BLD: 1.8 K/UL (ref 0.9–3.6)
LYMPHOCYTES NFR BLD: 28 % (ref 21–52)
MAGNESIUM SERPL-MCNC: 2.1 MG/DL (ref 1.6–2.6)
MCH RBC QN AUTO: 33.3 PG (ref 24–34)
MCHC RBC AUTO-ENTMCNC: 33.7 G/DL (ref 31–37)
MCV RBC AUTO: 98.8 FL (ref 78–100)
MONOCYTES # BLD: 0.5 K/UL (ref 0.05–1.2)
MONOCYTES NFR BLD: 7 % (ref 3–10)
NEUTS SEG # BLD: 4 K/UL (ref 1.8–8)
NEUTS SEG NFR BLD: 60 % (ref 40–73)
NRBC # BLD: 0 K/UL (ref 0–0.01)
NRBC BLD-RTO: 0 PER 100 WBC
PLATELET # BLD AUTO: 221 K/UL (ref 135–420)
PMV BLD AUTO: 10 FL (ref 9.2–11.8)
POTASSIUM SERPL-SCNC: 3.2 MMOL/L (ref 3.5–5.5)
PROT SERPL-MCNC: 7.4 G/DL (ref 6.4–8.2)
RBC # BLD AUTO: 3.27 M/UL (ref 4.35–5.65)
SODIUM SERPL-SCNC: 143 MMOL/L (ref 136–145)
TROPONIN I SERPL HS-MCNC: 8 NG/L (ref 0–78)
WBC # BLD AUTO: 6.7 K/UL (ref 4.6–13.2)

## 2024-05-17 PROCEDURE — 83735 ASSAY OF MAGNESIUM: CPT

## 2024-05-17 PROCEDURE — 82077 ASSAY SPEC XCP UR&BREATH IA: CPT

## 2024-05-17 PROCEDURE — 84484 ASSAY OF TROPONIN QUANT: CPT

## 2024-05-17 PROCEDURE — 96360 HYDRATION IV INFUSION INIT: CPT

## 2024-05-17 PROCEDURE — 71045 X-RAY EXAM CHEST 1 VIEW: CPT

## 2024-05-17 PROCEDURE — 80053 COMPREHEN METABOLIC PANEL: CPT

## 2024-05-17 PROCEDURE — 93005 ELECTROCARDIOGRAM TRACING: CPT | Performed by: STUDENT IN AN ORGANIZED HEALTH CARE EDUCATION/TRAINING PROGRAM

## 2024-05-17 PROCEDURE — 99285 EMERGENCY DEPT VISIT HI MDM: CPT

## 2024-05-17 PROCEDURE — 6370000000 HC RX 637 (ALT 250 FOR IP): Performed by: STUDENT IN AN ORGANIZED HEALTH CARE EDUCATION/TRAINING PROGRAM

## 2024-05-17 PROCEDURE — 85025 COMPLETE CBC W/AUTO DIFF WBC: CPT

## 2024-05-17 PROCEDURE — 2580000003 HC RX 258: Performed by: STUDENT IN AN ORGANIZED HEALTH CARE EDUCATION/TRAINING PROGRAM

## 2024-05-17 RX ORDER — SODIUM CHLORIDE 0.9 % (FLUSH) 0.9 %
5-40 SYRINGE (ML) INJECTION EVERY 12 HOURS SCHEDULED
Status: DISCONTINUED | OUTPATIENT
Start: 2024-05-17 | End: 2024-05-18 | Stop reason: HOSPADM

## 2024-05-17 RX ORDER — 0.9 % SODIUM CHLORIDE 0.9 %
1000 INTRAVENOUS SOLUTION INTRAVENOUS ONCE
Status: COMPLETED | OUTPATIENT
Start: 2024-05-17 | End: 2024-05-17

## 2024-05-17 RX ORDER — SODIUM CHLORIDE 9 MG/ML
INJECTION, SOLUTION INTRAVENOUS PRN
Status: DISCONTINUED | OUTPATIENT
Start: 2024-05-17 | End: 2024-05-18 | Stop reason: HOSPADM

## 2024-05-17 RX ORDER — SODIUM CHLORIDE 0.9 % (FLUSH) 0.9 %
5-40 SYRINGE (ML) INJECTION PRN
Status: DISCONTINUED | OUTPATIENT
Start: 2024-05-17 | End: 2024-05-18 | Stop reason: HOSPADM

## 2024-05-17 RX ORDER — POTASSIUM CHLORIDE 20 MEQ/1
40 TABLET, EXTENDED RELEASE ORAL ONCE
Status: COMPLETED | OUTPATIENT
Start: 2024-05-17 | End: 2024-05-17

## 2024-05-17 RX ADMIN — POTASSIUM CHLORIDE 40 MEQ: 1500 TABLET, EXTENDED RELEASE ORAL at 21:47

## 2024-05-17 RX ADMIN — SODIUM CHLORIDE 1000 ML: 9 INJECTION, SOLUTION INTRAVENOUS at 18:34

## 2024-05-17 NOTE — ED TRIAGE NOTES
Patient brought in by EMS for alcohol intoxication. Pt reports that he drank a pint of gin. Pt unsteady standing from chair to stretcher.
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
## 2024-05-18 LAB
EKG ATRIAL RATE: 44 BPM
EKG DIAGNOSIS: NORMAL
EKG P AXIS: 74 DEGREES
EKG P-R INTERVAL: 184 MS
EKG Q-T INTERVAL: 452 MS
EKG QRS DURATION: 112 MS
EKG QTC CALCULATION (BAZETT): 386 MS
EKG R AXIS: 75 DEGREES
EKG T AXIS: 44 DEGREES
EKG VENTRICULAR RATE: 44 BPM

## 2024-05-18 PROCEDURE — 93010 ELECTROCARDIOGRAM REPORT: CPT | Performed by: INTERNAL MEDICINE

## 2024-05-18 NOTE — DISCHARGE INSTRUCTIONS
You were evaluated for intoxication and fall .  Based on your work-up it was deemed that she was stable for discharge.    Please follow-up with your primary care physician if you have any further concerns and go over your work-up.  If you experience any chest pain, shortness of breath, worsening abdominal pain, vomiting blood, worsening headache, seizures, or any worsening of your symptoms please return to the emergency department immediately.  If you have any pending results or any further questions please contact the emergency department at [phone].

## 2024-05-18 NOTE — ED NOTES
Pt rolled to side of bed,  and urinated in floor.  When questioned pt stated I did not want to piss on myself

## 2024-05-24 ENCOUNTER — OFFICE VISIT (OUTPATIENT)
Age: 54
End: 2024-05-24

## 2024-05-24 VITALS — WEIGHT: 163 LBS | HEIGHT: 70 IN | TEMPERATURE: 97.7 F | BODY MASS INDEX: 23.34 KG/M2

## 2024-05-24 DIAGNOSIS — S82.832A CLOSED AVULSION FRACTURE OF DISTAL END OF LEFT FIBULA, INITIAL ENCOUNTER: Primary | ICD-10-CM

## 2024-05-24 NOTE — PROGRESS NOTES
is currently unemployed. He is applying for social security disability benefits for his knees and back. He has been working under the table for his previous job about 30 hours a week. He previously worked as a  for [employer].  His job required a lot of heavy pushing pulling and carrying.  He previously lived with his sister and brother in law in [place] but he now resides in the [facility] in [place]. He has no children or pets.   Wt Readings from Last 3 Encounters:   05/24/24 73.9 kg (163 lb)   05/17/24 68 kg (150 lb)   04/15/24 71.7 kg (158 lb)      Body mass index is 23.39 kg/m².    Patient Active Problem List   Diagnosis    Sepsis (HCC)    Hypothermia    Bradycardia    Other chest pain    Lactic acidosis    Hypoglycemia    Encephalopathy acute       Social History     Tobacco Use    Smoking status: Former     Passive exposure: Never   Substance Use Topics    Alcohol use: Yes     Comment: one pint a day    Drug use: Yes     Types: Marijuana (Weed)     Comment: daily        No Known Allergies     Current Outpatient Medications   Medication Sig    naproxen (NAPROSYN) 250 MG tablet Take 1 tablet by mouth 2 times daily (with meals)    Multiple Vitamins-Minerals (MULTIVITAMIN GUMMIES ADULT) CHEW Take 1 tablet by mouth daily    ibuprofen (ADVIL;MOTRIN) 600 MG tablet Take 1 tablet by mouth 3 times daily as needed for Pain    hydrALAZINE (APRESOLINE) 50 MG tablet Take 1 tablet by mouth every 8 hours (Patient not taking: Reported on 5/21/2023)    amLODIPine (NORVASC) 10 MG tablet Take 1 tablet by mouth nightly (Patient not taking: Reported on 5/21/2023)    lisinopril (PRINIVIL;ZESTRIL) 10 MG tablet Take 1 tablet by mouth daily (Patient not taking: Reported on 5/21/2023)    acetaminophen (TYLENOL) 325 MG tablet Take 650 mg by mouth every 6 hours as needed for Pain or Fever (Patient not taking: Reported on 5/21/2023)     No current facility-administered medications for this visit.

## 2024-05-28 ENCOUNTER — APPOINTMENT (OUTPATIENT)
Facility: HOSPITAL | Age: 54
End: 2024-05-28
Payer: COMMERCIAL

## 2024-05-28 ENCOUNTER — HOSPITAL ENCOUNTER (EMERGENCY)
Facility: HOSPITAL | Age: 54
Discharge: HOME | End: 2024-05-29
Attending: STUDENT IN AN ORGANIZED HEALTH CARE EDUCATION/TRAINING PROGRAM
Payer: COMMERCIAL

## 2024-05-28 DIAGNOSIS — E16.2 HYPOGLYCEMIA: Primary | ICD-10-CM

## 2024-05-28 DIAGNOSIS — T68.XXXA HYPOTHERMIA, INITIAL ENCOUNTER: ICD-10-CM

## 2024-05-28 DIAGNOSIS — F10.920 ACUTE ALCOHOLIC INTOXICATION WITHOUT COMPLICATION (HCC): ICD-10-CM

## 2024-05-28 LAB
ALBUMIN SERPL-MCNC: 4.5 G/DL (ref 3.4–5)
ALBUMIN/GLOB SERPL: 1.1 (ref 0.8–1.7)
ALP SERPL-CCNC: 85 U/L (ref 45–117)
ALT SERPL-CCNC: 33 U/L (ref 16–61)
AMPHET UR QL SCN: NEGATIVE
ANION GAP SERPL CALC-SCNC: 13 MMOL/L (ref 3–18)
APPEARANCE UR: CLEAR
AST SERPL-CCNC: 54 U/L (ref 10–38)
BACTERIA URNS QL MICRO: NEGATIVE /HPF
BARBITURATES UR QL SCN: NEGATIVE
BASOPHILS # BLD: 0.1 K/UL (ref 0–0.1)
BASOPHILS NFR BLD: 1 % (ref 0–2)
BENZODIAZ UR QL: NEGATIVE
BILIRUB DIRECT SERPL-MCNC: 0.3 MG/DL (ref 0–0.2)
BILIRUB SERPL-MCNC: 1.2 MG/DL (ref 0.2–1)
BILIRUB UR QL: NEGATIVE
BUN SERPL-MCNC: 21 MG/DL (ref 7–18)
BUN/CREAT SERPL: 16 (ref 12–20)
CALCIUM SERPL-MCNC: 9.9 MG/DL (ref 8.5–10.1)
CANNABINOIDS UR QL SCN: POSITIVE
CHLORIDE SERPL-SCNC: 104 MMOL/L (ref 100–111)
CO2 SERPL-SCNC: 22 MMOL/L (ref 21–32)
COCAINE UR QL SCN: NEGATIVE
COLOR UR: YELLOW
CREAT SERPL-MCNC: 1.31 MG/DL (ref 0.6–1.3)
DIFFERENTIAL METHOD BLD: ABNORMAL
EOSINOPHIL # BLD: 0.2 K/UL (ref 0–0.4)
EOSINOPHIL NFR BLD: 2 % (ref 0–5)
EPITH CASTS URNS QL MICRO: NORMAL /LPF (ref 0–5)
ERYTHROCYTE [DISTWIDTH] IN BLOOD BY AUTOMATED COUNT: 13.8 % (ref 11.6–14.5)
ETHANOL SERPL-MCNC: 144 MG/DL (ref 0–3)
GLOBULIN SER CALC-MCNC: 4 G/DL (ref 2–4)
GLUCOSE BLD STRIP.AUTO-MCNC: 141 MG/DL (ref 70–110)
GLUCOSE BLD STRIP.AUTO-MCNC: 189 MG/DL (ref 70–110)
GLUCOSE BLD STRIP.AUTO-MCNC: 225 MG/DL (ref 70–110)
GLUCOSE BLD STRIP.AUTO-MCNC: 35 MG/DL (ref 70–110)
GLUCOSE BLD STRIP.AUTO-MCNC: 36 MG/DL (ref 70–110)
GLUCOSE BLD STRIP.AUTO-MCNC: 43 MG/DL (ref 70–110)
GLUCOSE BLD STRIP.AUTO-MCNC: 45 MG/DL (ref 70–110)
GLUCOSE BLD STRIP.AUTO-MCNC: 46 MG/DL (ref 70–110)
GLUCOSE BLD STRIP.AUTO-MCNC: 46 MG/DL (ref 70–110)
GLUCOSE BLD STRIP.AUTO-MCNC: 84 MG/DL (ref 70–110)
GLUCOSE SERPL-MCNC: 43 MG/DL (ref 74–99)
GLUCOSE UR STRIP.AUTO-MCNC: 500 MG/DL
HCT VFR BLD AUTO: 38.7 % (ref 36–48)
HGB BLD-MCNC: 12.6 G/DL (ref 13–16)
HGB UR QL STRIP: NEGATIVE
HYALINE CASTS URNS QL MICRO: NORMAL /LPF (ref 0–2)
IMM GRANULOCYTES # BLD AUTO: 0 K/UL (ref 0–0.04)
IMM GRANULOCYTES NFR BLD AUTO: 0 % (ref 0–0.5)
KETONES UR QL STRIP.AUTO: 40 MG/DL
LEUKOCYTE ESTERASE UR QL STRIP.AUTO: NEGATIVE
LYMPHOCYTES # BLD: 2.5 K/UL (ref 0.9–3.6)
LYMPHOCYTES NFR BLD: 29 % (ref 21–52)
Lab: ABNORMAL
MCH RBC QN AUTO: 33.5 PG (ref 24–34)
MCHC RBC AUTO-ENTMCNC: 32.6 G/DL (ref 31–37)
MCV RBC AUTO: 102.9 FL (ref 78–100)
METHADONE UR QL: NEGATIVE
MONOCYTES # BLD: 0.4 K/UL (ref 0.05–1.2)
MONOCYTES NFR BLD: 5 % (ref 3–10)
MUCOUS THREADS URNS QL MICRO: NEGATIVE /LPF
NEUTS SEG # BLD: 5.3 K/UL (ref 1.8–8)
NEUTS SEG NFR BLD: 63 % (ref 40–73)
NITRITE UR QL STRIP.AUTO: NEGATIVE
NRBC # BLD: 0 K/UL (ref 0–0.01)
NRBC BLD-RTO: 0 PER 100 WBC
OPIATES UR QL: NEGATIVE
PCP UR QL: NEGATIVE
PH UR STRIP: 5.5 (ref 5–8)
PLATELET # BLD AUTO: 233 K/UL (ref 135–420)
PLATELET COMMENT: ABNORMAL
PMV BLD AUTO: 10.3 FL (ref 9.2–11.8)
POTASSIUM SERPL-SCNC: 3.7 MMOL/L (ref 3.5–5.5)
PROT SERPL-MCNC: 8.5 G/DL (ref 6.4–8.2)
PROT UR STRIP-MCNC: 30 MG/DL
RBC # BLD AUTO: 3.76 M/UL (ref 4.35–5.65)
RBC #/AREA URNS HPF: NORMAL /HPF (ref 0–5)
RBC MORPH BLD: ABNORMAL
RBC MORPH BLD: ABNORMAL
SODIUM SERPL-SCNC: 139 MMOL/L (ref 136–145)
SP GR UR REFRACTOMETRY: 1.02 (ref 1–1.03)
T4 FREE SERPL-MCNC: 1.1 NG/DL (ref 0.7–1.5)
TSH SERPL DL<=0.05 MIU/L-ACNC: 0.72 UIU/ML (ref 0.36–3.74)
UROBILINOGEN UR QL STRIP.AUTO: 0.2 EU/DL (ref 0.2–1)
WBC # BLD AUTO: 8.5 K/UL (ref 4.6–13.2)
WBC URNS QL MICRO: NORMAL /HPF (ref 0–4)

## 2024-05-28 PROCEDURE — 87186 SC STD MICRODIL/AGAR DIL: CPT

## 2024-05-28 PROCEDURE — 80076 HEPATIC FUNCTION PANEL: CPT

## 2024-05-28 PROCEDURE — 80307 DRUG TEST PRSMV CHEM ANLYZR: CPT

## 2024-05-28 PROCEDURE — 80048 BASIC METABOLIC PNL TOTAL CA: CPT

## 2024-05-28 PROCEDURE — 87086 URINE CULTURE/COLONY COUNT: CPT

## 2024-05-28 PROCEDURE — 99284 EMERGENCY DEPT VISIT MOD MDM: CPT

## 2024-05-28 PROCEDURE — 70450 CT HEAD/BRAIN W/O DYE: CPT

## 2024-05-28 PROCEDURE — 82077 ASSAY SPEC XCP UR&BREATH IA: CPT

## 2024-05-28 PROCEDURE — 84439 ASSAY OF FREE THYROXINE: CPT

## 2024-05-28 PROCEDURE — 87088 URINE BACTERIA CULTURE: CPT

## 2024-05-28 PROCEDURE — 81001 URINALYSIS AUTO W/SCOPE: CPT

## 2024-05-28 PROCEDURE — 96361 HYDRATE IV INFUSION ADD-ON: CPT

## 2024-05-28 PROCEDURE — 96360 HYDRATION IV INFUSION INIT: CPT

## 2024-05-28 PROCEDURE — 84443 ASSAY THYROID STIM HORMONE: CPT

## 2024-05-28 PROCEDURE — 2580000003 HC RX 258: Performed by: STUDENT IN AN ORGANIZED HEALTH CARE EDUCATION/TRAINING PROGRAM

## 2024-05-28 PROCEDURE — 85025 COMPLETE CBC W/AUTO DIFF WBC: CPT

## 2024-05-28 PROCEDURE — 82962 GLUCOSE BLOOD TEST: CPT

## 2024-05-28 RX ORDER — SODIUM CHLORIDE, SODIUM LACTATE, POTASSIUM CHLORIDE, AND CALCIUM CHLORIDE .6; .31; .03; .02 G/100ML; G/100ML; G/100ML; G/100ML
1000 INJECTION, SOLUTION INTRAVENOUS ONCE
Status: COMPLETED | OUTPATIENT
Start: 2024-05-28 | End: 2024-05-28

## 2024-05-28 RX ADMIN — SODIUM CHLORIDE, POTASSIUM CHLORIDE, SODIUM LACTATE AND CALCIUM CHLORIDE 1000 ML: 600; 310; 30; 20 INJECTION, SOLUTION INTRAVENOUS at 19:59

## 2024-05-28 RX ADMIN — DEXTROSE MONOHYDRATE 250 ML: 10 INJECTION, SOLUTION INTRAVENOUS at 20:58

## 2024-05-28 ASSESSMENT — PAIN - FUNCTIONAL ASSESSMENT: PAIN_FUNCTIONAL_ASSESSMENT: 0-10

## 2024-05-28 ASSESSMENT — PAIN SCALES - GENERAL
PAINLEVEL_OUTOF10: 0
PAINLEVEL_OUTOF10: 0

## 2024-05-28 NOTE — ED TRIAGE NOTES
Pt arrived via EMS for reported alcohol intoxication.  Per EMS patient states he has been outside all day drinking and need somewhere cool to be.  Pt reports falling fatigue and states he was hot and tired of sitting out side.  Pt denies any pain and repeatedly ask for orange juice during triage.

## 2024-05-28 NOTE — PROGRESS NOTES
Blood glucose low at bedside turnover given 3 juices  1945 rechecked still low, given apples, per hold off on D10. Attempted to get him to drink apple juice and starry also tried to get him to eat, he was alert and awake c/o being cold. He was diaphoretic    Patient moved by this RN to bed 10.  Blood sugar check was 36 and 35 and D10 was initiated. RN Tete given SBAR< MAR, ED summary and a chance to ask questions.

## 2024-05-29 VITALS
WEIGHT: 160 LBS | TEMPERATURE: 97.6 F | HEIGHT: 71 IN | HEART RATE: 73 BPM | SYSTOLIC BLOOD PRESSURE: 135 MMHG | BODY MASS INDEX: 22.4 KG/M2 | DIASTOLIC BLOOD PRESSURE: 76 MMHG | RESPIRATION RATE: 16 BRPM | OXYGEN SATURATION: 100 %

## 2024-05-29 LAB — GLUCOSE BLD STRIP.AUTO-MCNC: 148 MG/DL (ref 70–110)

## 2024-05-29 PROCEDURE — 82962 GLUCOSE BLOOD TEST: CPT

## 2024-05-29 NOTE — FLOWSHEET NOTE
05/28/24 2100   Vital Signs   Temp (!) 90.4 °F (32.4 °C)   Temp Source Rectal   Pulse (!) 43   Respirations 23   BP (!) 141/80   MAP (Calculated) 100   MAP (mmHg) 95   Oxygen Therapy   SpO2 100 %   Pulse via Oximetry 46 beats per minute     Placed on [facility]

## 2024-05-29 NOTE — ED NOTES
Pt A+Ox4. Pt was hypoglycemic. Given D10% bolus. Tempt found to be 90.4 rectally. Placed on reina hugger.  Placed on full monitor.

## 2024-05-29 NOTE — ED PROVIDER NOTES
Benzodiazepines, Urine Negative NEG      Barbiturates, Urine Negative NEG      THC, TH-Cannabinol, Urine Positive (A) NEG      Opiates, Urine Negative NEG      Phencyclidine, Urine Negative NEG      Cocaine, Urine Negative NEG      Amphetamine, Urine Negative NEG      Methadone, Urine Negative NEG      Comments: (NOTE)    Urinalysis, Micro    Collection Time: 05/28/24 11:05 PM   Result Value Ref Range    WBC, UA 0 to 1 0 - 4 /hpf    RBC, UA 0 to 1 0 - 5 /hpf    Epithelial Cells, UA FEW 0 - 5 /lpf    BACTERIA, URINE Negative NEG /hpf    Mucus, UA Negative NEG /lpf    Hyaline Casts, UA 10 to 15 0 - 2 /lpf   POCT Glucose    Collection Time: 05/29/24 12:59 AM   Result Value Ref Range    POC Glucose 148 (H) 70 - 110 mg/dL      Labs Reviewed   CBC WITH AUTO DIFFERENTIAL - Abnormal; Notable for the following components:       Result Value    RBC 3.76 (*)     Hemoglobin 12.6 (*)     .9 (*)     All other components within normal limits   BASIC METABOLIC PANEL - Abnormal; Notable for the following components:    Glucose 43 (*)     BUN 21 (*)     Creatinine 1.31 (*)     All other components within normal limits   HEPATIC FUNCTION PANEL - Abnormal; Notable for the following components:    Total Protein 8.5 (*)     Total Bilirubin 1.2 (*)     Bilirubin, Direct 0.3 (*)     AST 54 (*)     All other components within normal limits   ETHANOL - Abnormal; Notable for the following components:    Ethanol Lvl 144 (*)     All other components within normal limits   URINALYSIS - Abnormal; Notable for the following components:    Protein, UA 30 (*)     Glucose, Ur 500 (*)     Ketones, Urine 40 (*)     All other components within normal limits   URINE DRUG SCREEN - Abnormal; Notable for the following components:    THC, TH-Cannabinol, Urine Positive (*)     All other components within normal limits   POCT GLUCOSE - Abnormal; Notable for the following components:    POC Glucose 46 (*)     All other components within normal limits

## 2024-05-31 LAB
BACTERIA SPEC CULT: ABNORMAL
CC UR VC: ABNORMAL
SERVICE CMNT-IMP: ABNORMAL

## 2024-06-15 ENCOUNTER — HOSPITAL ENCOUNTER (EMERGENCY)
Facility: HOSPITAL | Age: 54
Discharge: HOME | End: 2024-06-15
Attending: EMERGENCY MEDICINE
Payer: COMMERCIAL

## 2024-06-15 VITALS
OXYGEN SATURATION: 100 % | SYSTOLIC BLOOD PRESSURE: 168 MMHG | HEART RATE: 60 BPM | TEMPERATURE: 97.8 F | BODY MASS INDEX: 22.4 KG/M2 | DIASTOLIC BLOOD PRESSURE: 93 MMHG | RESPIRATION RATE: 15 BRPM | WEIGHT: 160 LBS | HEIGHT: 71 IN

## 2024-06-15 DIAGNOSIS — F10.20 ALCOHOLISM (HCC): ICD-10-CM

## 2024-06-15 DIAGNOSIS — R82.4 KETONURIA: ICD-10-CM

## 2024-06-15 DIAGNOSIS — E16.2 HYPOGLYCEMIA: Primary | ICD-10-CM

## 2024-06-15 DIAGNOSIS — E86.0 DEHYDRATION: ICD-10-CM

## 2024-06-15 LAB
ALBUMIN SERPL-MCNC: 4.8 G/DL (ref 3.4–5)
ALBUMIN/GLOB SERPL: 1.1 (ref 0.8–1.7)
ALP SERPL-CCNC: 110 U/L (ref 45–117)
ALT SERPL-CCNC: 49 U/L (ref 16–61)
ANION GAP SERPL CALC-SCNC: 18 MMOL/L (ref 3–18)
APPEARANCE UR: CLEAR
AST SERPL-CCNC: 119 U/L (ref 10–38)
BACTERIA URNS QL MICRO: NEGATIVE /HPF
BASOPHILS # BLD: 0 K/UL (ref 0–0.1)
BASOPHILS NFR BLD: 0 % (ref 0–2)
BILIRUB SERPL-MCNC: 1.3 MG/DL (ref 0.2–1)
BILIRUB UR QL: NEGATIVE
BUN SERPL-MCNC: 23 MG/DL (ref 7–18)
BUN/CREAT SERPL: 21 (ref 12–20)
CALCIUM SERPL-MCNC: 9.9 MG/DL (ref 8.5–10.1)
CHLORIDE SERPL-SCNC: 99 MMOL/L (ref 100–111)
CO2 SERPL-SCNC: 19 MMOL/L (ref 21–32)
COLOR UR: YELLOW
CREAT SERPL-MCNC: 1.07 MG/DL (ref 0.6–1.3)
DIFFERENTIAL METHOD BLD: ABNORMAL
EKG ATRIAL RATE: 49 BPM
EKG DIAGNOSIS: NORMAL
EKG P AXIS: 29 DEGREES
EKG P-R INTERVAL: 138 MS
EKG Q-T INTERVAL: 492 MS
EKG QRS DURATION: 102 MS
EKG QTC CALCULATION (BAZETT): 444 MS
EKG R AXIS: 70 DEGREES
EKG T AXIS: 61 DEGREES
EKG VENTRICULAR RATE: 49 BPM
EOSINOPHIL # BLD: 0 K/UL (ref 0–0.4)
EOSINOPHIL NFR BLD: 0 % (ref 0–5)
EPITH CASTS URNS QL MICRO: NEGATIVE /LPF (ref 0–5)
ERYTHROCYTE [DISTWIDTH] IN BLOOD BY AUTOMATED COUNT: 13.7 % (ref 11.6–14.5)
EST. AVERAGE GLUCOSE BLD GHB EST-MCNC: 91 MG/DL
GLOBULIN SER CALC-MCNC: 4.2 G/DL (ref 2–4)
GLUCOSE BLD STRIP.AUTO-MCNC: 229 MG/DL (ref 70–110)
GLUCOSE BLD STRIP.AUTO-MCNC: 44 MG/DL (ref 70–110)
GLUCOSE BLD STRIP.AUTO-MCNC: 54 MG/DL (ref 70–110)
GLUCOSE BLD STRIP.AUTO-MCNC: 69 MG/DL (ref 70–110)
GLUCOSE BLD STRIP.AUTO-MCNC: 70 MG/DL (ref 70–110)
GLUCOSE SERPL-MCNC: 43 MG/DL (ref 74–99)
GLUCOSE UR STRIP.AUTO-MCNC: NEGATIVE MG/DL
HBA1C MFR BLD: 4.8 % (ref 4.2–5.6)
HCT VFR BLD AUTO: 41.7 % (ref 36–48)
HGB BLD-MCNC: 13.6 G/DL (ref 13–16)
HGB UR QL STRIP: NEGATIVE
IMM GRANULOCYTES # BLD AUTO: 0 K/UL (ref 0–0.04)
IMM GRANULOCYTES NFR BLD AUTO: 0 % (ref 0–0.5)
KETONES UR QL STRIP.AUTO: 40 MG/DL
LEUKOCYTE ESTERASE UR QL STRIP.AUTO: NEGATIVE
LIPASE SERPL-CCNC: 28 U/L (ref 13–75)
LYMPHOCYTES # BLD: 0.8 K/UL (ref 0.9–3.6)
LYMPHOCYTES NFR BLD: 12 % (ref 21–52)
MAGNESIUM SERPL-MCNC: 1.8 MG/DL (ref 1.6–2.6)
MCH RBC QN AUTO: 33.5 PG (ref 24–34)
MCHC RBC AUTO-ENTMCNC: 32.6 G/DL (ref 31–37)
MCV RBC AUTO: 102.7 FL (ref 78–100)
MONOCYTES # BLD: 0.2 K/UL (ref 0.05–1.2)
MONOCYTES NFR BLD: 3 % (ref 3–10)
NEUTS SEG # BLD: 6 K/UL (ref 1.8–8)
NEUTS SEG NFR BLD: 85 % (ref 40–73)
NITRITE UR QL STRIP.AUTO: NEGATIVE
NRBC # BLD: 0 K/UL (ref 0–0.01)
NRBC BLD-RTO: 0 PER 100 WBC
PH UR STRIP: 5.5 (ref 5–8)
PLATELET # BLD AUTO: 216 K/UL (ref 135–420)
PMV BLD AUTO: 9.6 FL (ref 9.2–11.8)
POTASSIUM SERPL-SCNC: 4.1 MMOL/L (ref 3.5–5.5)
PROT SERPL-MCNC: 9 G/DL (ref 6.4–8.2)
PROT UR STRIP-MCNC: ABNORMAL MG/DL
RBC # BLD AUTO: 4.06 M/UL (ref 4.35–5.65)
RBC #/AREA URNS HPF: NEGATIVE /HPF (ref 0–5)
SODIUM SERPL-SCNC: 136 MMOL/L (ref 136–145)
SP GR UR REFRACTOMETRY: 1.02 (ref 1–1.03)
TROPONIN I SERPL HS-MCNC: 8 NG/L (ref 0–78)
TROPONIN I SERPL HS-MCNC: 8 NG/L (ref 0–78)
UROBILINOGEN UR QL STRIP.AUTO: 0.2 EU/DL (ref 0.2–1)
WBC # BLD AUTO: 7.1 K/UL (ref 4.6–13.2)
WBC URNS QL MICRO: NEGATIVE /HPF (ref 0–4)

## 2024-06-15 PROCEDURE — 99284 EMERGENCY DEPT VISIT MOD MDM: CPT

## 2024-06-15 PROCEDURE — 83735 ASSAY OF MAGNESIUM: CPT

## 2024-06-15 PROCEDURE — 2500000003 HC RX 250 WO HCPCS: Performed by: EMERGENCY MEDICINE

## 2024-06-15 PROCEDURE — 83690 ASSAY OF LIPASE: CPT

## 2024-06-15 PROCEDURE — 93010 ELECTROCARDIOGRAM REPORT: CPT | Performed by: INTERNAL MEDICINE

## 2024-06-15 PROCEDURE — 96365 THER/PROPH/DIAG IV INF INIT: CPT

## 2024-06-15 PROCEDURE — 84484 ASSAY OF TROPONIN QUANT: CPT

## 2024-06-15 PROCEDURE — 96366 THER/PROPH/DIAG IV INF ADDON: CPT

## 2024-06-15 PROCEDURE — 80053 COMPREHEN METABOLIC PANEL: CPT

## 2024-06-15 PROCEDURE — 96375 TX/PRO/DX INJ NEW DRUG ADDON: CPT

## 2024-06-15 PROCEDURE — 83036 HEMOGLOBIN GLYCOSYLATED A1C: CPT

## 2024-06-15 PROCEDURE — 96361 HYDRATE IV INFUSION ADD-ON: CPT

## 2024-06-15 PROCEDURE — 82962 GLUCOSE BLOOD TEST: CPT

## 2024-06-15 PROCEDURE — 85025 COMPLETE CBC W/AUTO DIFF WBC: CPT

## 2024-06-15 PROCEDURE — 93005 ELECTROCARDIOGRAM TRACING: CPT | Performed by: EMERGENCY MEDICINE

## 2024-06-15 PROCEDURE — 94761 N-INVAS EAR/PLS OXIMETRY MLT: CPT

## 2024-06-15 PROCEDURE — 2580000003 HC RX 258: Performed by: EMERGENCY MEDICINE

## 2024-06-15 PROCEDURE — 96368 THER/DIAG CONCURRENT INF: CPT

## 2024-06-15 PROCEDURE — 81001 URINALYSIS AUTO W/SCOPE: CPT

## 2024-06-15 PROCEDURE — 6360000002 HC RX W HCPCS: Performed by: EMERGENCY MEDICINE

## 2024-06-15 RX ORDER — FOLIC ACID 5 MG/ML
1 INJECTION, SOLUTION INTRAMUSCULAR; INTRAVENOUS; SUBCUTANEOUS
Status: DISCONTINUED | OUTPATIENT
Start: 2024-06-15 | End: 2024-06-15 | Stop reason: CLARIF

## 2024-06-15 RX ORDER — GLUCOSAMINE HCL/CHONDROITIN SU 500-400 MG
CAPSULE ORAL
Qty: 50 STRIP | Refills: 0 | Status: SHIPPED | OUTPATIENT
Start: 2024-06-15

## 2024-06-15 RX ORDER — BLOOD-GLUCOSE METER
1 KIT MISCELLANEOUS DAILY
Qty: 1 KIT | Refills: 0 | Status: SHIPPED | OUTPATIENT
Start: 2024-06-15

## 2024-06-15 RX ORDER — LANCETS 30 GAUGE
1 EACH MISCELLANEOUS DAILY
Qty: 100 EACH | Refills: 0 | Status: SHIPPED | OUTPATIENT
Start: 2024-06-15

## 2024-06-15 RX ORDER — SODIUM CHLORIDE, SODIUM LACTATE, POTASSIUM CHLORIDE, AND CALCIUM CHLORIDE .6; .31; .03; .02 G/100ML; G/100ML; G/100ML; G/100ML
1000 INJECTION, SOLUTION INTRAVENOUS ONCE
Status: COMPLETED | OUTPATIENT
Start: 2024-06-15 | End: 2024-06-15

## 2024-06-15 RX ORDER — SODIUM CHLORIDE, SODIUM LACTATE, POTASSIUM CHLORIDE, CALCIUM CHLORIDE 600; 310; 30; 20 MG/100ML; MG/100ML; MG/100ML; MG/100ML
INJECTION, SOLUTION INTRAVENOUS CONTINUOUS
Status: DISCONTINUED | OUTPATIENT
Start: 2024-06-15 | End: 2024-06-15 | Stop reason: HOSPADM

## 2024-06-15 RX ORDER — THIAMINE MONONITRATE (VIT B1) 100 MG
100 TABLET ORAL DAILY
Qty: 30 TABLET | Refills: 3 | Status: SHIPPED | OUTPATIENT
Start: 2024-06-15

## 2024-06-15 RX ORDER — DEXTROSE, SODIUM CHLORIDE, SODIUM LACTATE, POTASSIUM CHLORIDE, AND CALCIUM CHLORIDE 5; .6; .31; .03; .02 G/100ML; G/100ML; G/100ML; G/100ML; G/100ML
INJECTION, SOLUTION INTRAVENOUS CONTINUOUS
Status: DISCONTINUED | OUTPATIENT
Start: 2024-06-15 | End: 2024-06-15 | Stop reason: HOSPADM

## 2024-06-15 RX ORDER — THIAMINE HYDROCHLORIDE 100 MG/ML
100 INJECTION, SOLUTION INTRAMUSCULAR; INTRAVENOUS ONCE
Status: COMPLETED | OUTPATIENT
Start: 2024-06-15 | End: 2024-06-15

## 2024-06-15 RX ORDER — FOLIC ACID 1 MG/1
1 TABLET ORAL DAILY
Qty: 30 TABLET | Refills: 5 | Status: SHIPPED | OUTPATIENT
Start: 2024-06-15

## 2024-06-15 RX ADMIN — SODIUM CHLORIDE, SODIUM LACTATE, POTASSIUM CHLORIDE, AND CALCIUM CHLORIDE 1000 ML: 600; 310; 30; 20 INJECTION, SOLUTION INTRAVENOUS at 08:12

## 2024-06-15 RX ADMIN — FOLIC ACID 1 MG: 5 INJECTION, SOLUTION INTRAMUSCULAR; INTRAVENOUS; SUBCUTANEOUS at 11:06

## 2024-06-15 RX ADMIN — SODIUM CHLORIDE, SODIUM LACTATE, POTASSIUM CHLORIDE, CALCIUM CHLORIDE AND DEXTROSE MONOHYDRATE: 5; 600; 310; 30; 20 INJECTION, SOLUTION INTRAVENOUS at 11:06

## 2024-06-15 RX ADMIN — THIAMINE HYDROCHLORIDE 100 MG: 100 INJECTION, SOLUTION INTRAMUSCULAR; INTRAVENOUS at 09:37

## 2024-06-15 ASSESSMENT — ENCOUNTER SYMPTOMS
CHEST TIGHTNESS: 0
EYES NEGATIVE: 1
SHORTNESS OF BREATH: 1
ABDOMINAL PAIN: 0

## 2024-06-15 ASSESSMENT — PAIN - FUNCTIONAL ASSESSMENT: PAIN_FUNCTIONAL_ASSESSMENT: 0-10

## 2024-06-15 ASSESSMENT — PAIN SCALES - GENERAL: PAINLEVEL_OUTOF10: 6

## 2024-06-15 ASSESSMENT — LIFESTYLE VARIABLES
HOW MANY STANDARD DRINKS CONTAINING ALCOHOL DO YOU HAVE ON A TYPICAL DAY: 1 OR 2
HOW OFTEN DO YOU HAVE A DRINK CONTAINING ALCOHOL: 4 OR MORE TIMES A WEEK

## 2024-06-15 NOTE — ED TRIAGE NOTES
PT presents to the emergency department via EMS c/o generalized fatigue. PT states he was here a few weeks ago for the same issue and was told that he is dehydrated. PT states he has been drinking plenty of water and has not been drinking as much alcohol. PT reports living in a \"new build\" type of building but is technically homeless so he doesn't drink at much anymore. PT states that last night he \"just couldn't find the energy\" and started sweating. PT awake, alert, and oriented x4 on arrival to ED. Provider at bedside. VSS. PT requesting pain medication and orange juice at this time.

## 2024-06-15 NOTE — ED NOTES
Repeat glucose - 229. MD aware.      Patient pain on discharge 0.  Discharge instructions Transition record discussed with patient/caregiver and provided this record in hard copy or electronically .  [facility]  Discharge Method ambulatory.  Discharged with patient.

## 2024-06-15 NOTE — ED PROVIDER NOTES
EMERGENCY DEPARTMENT HISTORY AND PHYSICAL EXAM    7:47 AM      Date: 6/15/2024  Patient Name: Arnold Keys    History of Presenting Illness     Chief Complaint   Patient presents with    Shortness of Breath    Fatigue       History From: Patient    Arnold Keys is a 53 y.o. male   The patient is a 53-year-old male with a history of alcoholism, occasional marijuana use, hypertension, recent noncompliance, homelessness, who presents emergency department with some lightheadedness and near syncope this morning.  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  Patient felt some palpitations and said he did not feel right and said a similar episode happened last year and said he had a problem with his blood but not sure what it is.  Patient said he has not his blood pressure medicine in about 2 weeks because the [facility] has not had your pharmacy open.  Patient denies being a cigarette smoker but does smoke marijuana, is did not have a heavy night of alcohol but says he drinks regularly and has a reason why he is somewhat estranged from his family.  They do not like the fact that he drinks heavily.  Patient denies any withdrawal at this time.  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           Nursing Notes were all reviewed and agreed with or any disagreements were addressed in the HPI.    PCP: Kenney Hill MD    Current Facility-Administered Medications   Medication Dose Route Frequency Provider Last Rate Last  Applicable\"}      Diagnosis     Clinical Impression: No diagnosis found.    Disposition: ***    No follow-up provider specified.     Disclaimer: Sections of this note are dictated using utilizing voice recognition software.  Minor typographical errors may be present. If questions arise, please do not hesitate to contact me or call our department.

## 2024-06-15 NOTE — DISCHARGE INSTRUCTIONS
Your blood sugar was low and it is because you not eating enough and drinking alcohol.  Need to make sure you are well-hydrated, decrease your alcohol intake slowly and responsibly and take a multivitamin with thiamine and folate acid every day and eat 3 times a day.  I have given you a blood sugar monitor so he can check your blood sugar when you start feeling lightheaded to see your blood sugar is and if it is starting to run low meaning it is 65 or below eat something substantial.  Make sure to discuss your blood sugar with your primary provider and please return if you are at all worsened or concerned.

## 2024-06-15 NOTE — ED NOTES
POC glucose performed - 44. MD aware. PT provided with 6 4oz cups of orange juice. PT awake, alert, oriented and able to handle secretions.

## 2024-07-21 ENCOUNTER — HOSPITAL ENCOUNTER (EMERGENCY)
Facility: HOSPITAL | Age: 54
Discharge: HOME | End: 2024-07-22
Attending: EMERGENCY MEDICINE
Payer: COMMERCIAL

## 2024-07-21 ENCOUNTER — APPOINTMENT (OUTPATIENT)
Facility: HOSPITAL | Age: 54
End: 2024-07-21
Payer: COMMERCIAL

## 2024-07-21 DIAGNOSIS — E16.2 HYPOGLYCEMIA: Primary | ICD-10-CM

## 2024-07-21 DIAGNOSIS — T68.XXXA HYPOTHERMIA, INITIAL ENCOUNTER: ICD-10-CM

## 2024-07-21 LAB
ALBUMIN SERPL-MCNC: 4.3 G/DL (ref 3.4–5)
ALBUMIN/GLOB SERPL: 1.3 (ref 0.8–1.7)
ALP SERPL-CCNC: 89 U/L (ref 45–117)
ALT SERPL-CCNC: 34 U/L (ref 16–61)
AMPHET UR QL SCN: NEGATIVE
ANION GAP SERPL CALC-SCNC: 12 MMOL/L (ref 3–18)
APPEARANCE UR: CLEAR
ARTERIAL PATENCY WRIST A: POSITIVE
AST SERPL-CCNC: 49 U/L (ref 10–38)
BARBITURATES UR QL SCN: NEGATIVE
BASE DEFICIT BLD-SCNC: 7.3 MMOL/L
BASOPHILS # BLD: 0 K/UL (ref 0–0.1)
BASOPHILS NFR BLD: 1 % (ref 0–2)
BDY SITE: ABNORMAL
BENZODIAZ UR QL: NEGATIVE
BILIRUB SERPL-MCNC: 0.8 MG/DL (ref 0.2–1)
BILIRUB UR QL: NEGATIVE
BUN SERPL-MCNC: 12 MG/DL (ref 7–18)
BUN/CREAT SERPL: 12 (ref 12–20)
CALCIUM SERPL-MCNC: 9.4 MG/DL (ref 8.5–10.1)
CANNABINOIDS UR QL SCN: POSITIVE
CHLORIDE SERPL-SCNC: 108 MMOL/L (ref 100–111)
CK SERPL-CCNC: 647 U/L (ref 39–308)
CO2 SERPL-SCNC: 24 MMOL/L (ref 21–32)
COCAINE UR QL SCN: NEGATIVE
COLOR UR: YELLOW
CREAT SERPL-MCNC: 1 MG/DL (ref 0.6–1.3)
DIFFERENTIAL METHOD BLD: ABNORMAL
EOSINOPHIL # BLD: 0.3 K/UL (ref 0–0.4)
EOSINOPHIL NFR BLD: 3 % (ref 0–5)
ERYTHROCYTE [DISTWIDTH] IN BLOOD BY AUTOMATED COUNT: 14.1 % (ref 11.6–14.5)
ETHANOL SERPL-MCNC: 199 MG/DL (ref 0–3)
GAS FLOW.O2 O2 DELIVERY SYS: ABNORMAL
GLOBULIN SER CALC-MCNC: 3.3 G/DL (ref 2–4)
GLUCOSE BLD STRIP.AUTO-MCNC: 145 MG/DL (ref 70–110)
GLUCOSE BLD STRIP.AUTO-MCNC: 42 MG/DL (ref 70–110)
GLUCOSE BLD STRIP.AUTO-MCNC: 85 MG/DL (ref 70–110)
GLUCOSE SERPL-MCNC: 41 MG/DL (ref 74–99)
GLUCOSE UR STRIP.AUTO-MCNC: NEGATIVE MG/DL
HCO3 BLD-SCNC: 18.9 MMOL/L (ref 22–26)
HCT VFR BLD AUTO: 36.1 % (ref 36–48)
HGB BLD-MCNC: 12 G/DL (ref 13–16)
HGB UR QL STRIP: NEGATIVE
IMM GRANULOCYTES # BLD AUTO: 0 K/UL (ref 0–0.04)
IMM GRANULOCYTES NFR BLD AUTO: 0 % (ref 0–0.5)
KETONES UR QL STRIP.AUTO: 15 MG/DL
LACTATE SERPL-SCNC: 4.1 MMOL/L (ref 0.4–2)
LACTATE SERPL-SCNC: 4.5 MMOL/L (ref 0.4–2)
LEUKOCYTE ESTERASE UR QL STRIP.AUTO: NEGATIVE
LYMPHOCYTES # BLD: 3.2 K/UL (ref 0.9–3.6)
LYMPHOCYTES NFR BLD: 42 % (ref 21–52)
Lab: ABNORMAL
MCH RBC QN AUTO: 34.2 PG (ref 24–34)
MCHC RBC AUTO-ENTMCNC: 33.2 G/DL (ref 31–37)
MCV RBC AUTO: 102.8 FL (ref 78–100)
METHADONE UR QL: NEGATIVE
MONOCYTES # BLD: 0.6 K/UL (ref 0.05–1.2)
MONOCYTES NFR BLD: 7 % (ref 3–10)
NEUTS SEG # BLD: 3.6 K/UL (ref 1.8–8)
NEUTS SEG NFR BLD: 46 % (ref 40–73)
NITRITE UR QL STRIP.AUTO: NEGATIVE
NRBC # BLD: 0 K/UL (ref 0–0.01)
NRBC BLD-RTO: 0 PER 100 WBC
O2/TOTAL GAS SETTING VFR VENT: 21 %
OPIATES UR QL: NEGATIVE
PCO2 BLD: 40.3 MMHG (ref 35–45)
PCP UR QL: NEGATIVE
PH BLD: 7.28 (ref 7.35–7.45)
PH UR STRIP: 5.5 (ref 5–8)
PLATELET # BLD AUTO: 256 K/UL (ref 135–420)
PMV BLD AUTO: 9.9 FL (ref 9.2–11.8)
PO2 BLD: 91 MMHG (ref 80–100)
POTASSIUM SERPL-SCNC: 3 MMOL/L (ref 3.5–5.5)
PROT SERPL-MCNC: 7.6 G/DL (ref 6.4–8.2)
PROT UR STRIP-MCNC: NEGATIVE MG/DL
RBC # BLD AUTO: 3.51 M/UL (ref 4.35–5.65)
RESPIRATORY RATE, POC: 16 (ref 5–40)
SAO2 % BLD: 95.9 % (ref 92–97)
SERVICE CMNT-IMP: ABNORMAL
SODIUM SERPL-SCNC: 144 MMOL/L (ref 136–145)
SP GR UR REFRACTOMETRY: 1.01 (ref 1–1.03)
SPECIMEN TYPE: ABNORMAL
TROPONIN I SERPL HS-MCNC: 8 NG/L (ref 0–78)
UROBILINOGEN UR QL STRIP.AUTO: 0.2 EU/DL (ref 0.2–1)
WBC # BLD AUTO: 7.7 K/UL (ref 4.6–13.2)

## 2024-07-21 PROCEDURE — 84484 ASSAY OF TROPONIN QUANT: CPT

## 2024-07-21 PROCEDURE — 82077 ASSAY SPEC XCP UR&BREATH IA: CPT

## 2024-07-21 PROCEDURE — 83605 ASSAY OF LACTIC ACID: CPT

## 2024-07-21 PROCEDURE — 99285 EMERGENCY DEPT VISIT HI MDM: CPT

## 2024-07-21 PROCEDURE — 96361 HYDRATE IV INFUSION ADD-ON: CPT

## 2024-07-21 PROCEDURE — 71045 X-RAY EXAM CHEST 1 VIEW: CPT

## 2024-07-21 PROCEDURE — 70450 CT HEAD/BRAIN W/O DYE: CPT

## 2024-07-21 PROCEDURE — 82962 GLUCOSE BLOOD TEST: CPT

## 2024-07-21 PROCEDURE — 6360000002 HC RX W HCPCS: Performed by: EMERGENCY MEDICINE

## 2024-07-21 PROCEDURE — 36600 WITHDRAWAL OF ARTERIAL BLOOD: CPT

## 2024-07-21 PROCEDURE — 96365 THER/PROPH/DIAG IV INF INIT: CPT

## 2024-07-21 PROCEDURE — 96366 THER/PROPH/DIAG IV INF ADDON: CPT

## 2024-07-21 PROCEDURE — 81003 URINALYSIS AUTO W/O SCOPE: CPT

## 2024-07-21 PROCEDURE — 80307 DRUG TEST PRSMV CHEM ANLYZR: CPT

## 2024-07-21 PROCEDURE — 96367 TX/PROPH/DG ADDL SEQ IV INF: CPT

## 2024-07-21 PROCEDURE — 85025 COMPLETE CBC W/AUTO DIFF WBC: CPT

## 2024-07-21 PROCEDURE — 2580000003 HC RX 258: Performed by: EMERGENCY MEDICINE

## 2024-07-21 PROCEDURE — 72125 CT NECK SPINE W/O DYE: CPT

## 2024-07-21 PROCEDURE — 87040 BLOOD CULTURE FOR BACTERIA: CPT

## 2024-07-21 PROCEDURE — 82550 ASSAY OF CK (CPK): CPT

## 2024-07-21 PROCEDURE — 93005 ELECTROCARDIOGRAM TRACING: CPT | Performed by: EMERGENCY MEDICINE

## 2024-07-21 PROCEDURE — 80053 COMPREHEN METABOLIC PANEL: CPT

## 2024-07-21 PROCEDURE — 82803 BLOOD GASES ANY COMBINATION: CPT

## 2024-07-21 RX ORDER — 0.9 % SODIUM CHLORIDE 0.9 %
1000 INTRAVENOUS SOLUTION INTRAVENOUS ONCE
Status: COMPLETED | OUTPATIENT
Start: 2024-07-21 | End: 2024-07-21

## 2024-07-21 RX ADMIN — PIPERACILLIN AND TAZOBACTAM 4500 MG: 4; .5 INJECTION, POWDER, FOR SOLUTION INTRAVENOUS at 21:46

## 2024-07-21 RX ADMIN — DEXTROSE MONOHYDRATE 250 ML: 100 INJECTION, SOLUTION INTRAVENOUS at 20:05

## 2024-07-21 RX ADMIN — VANCOMYCIN HYDROCHLORIDE 1500 MG: 10 INJECTION, POWDER, LYOPHILIZED, FOR SOLUTION INTRAVENOUS at 21:48

## 2024-07-21 RX ADMIN — SODIUM CHLORIDE 1000 ML: 9 INJECTION, SOLUTION INTRAVENOUS at 20:32

## 2024-07-21 ASSESSMENT — PAIN - FUNCTIONAL ASSESSMENT: PAIN_FUNCTIONAL_ASSESSMENT: NONE - DENIES PAIN

## 2024-07-22 VITALS
HEART RATE: 52 BPM | OXYGEN SATURATION: 99 % | BODY MASS INDEX: 22.4 KG/M2 | DIASTOLIC BLOOD PRESSURE: 90 MMHG | SYSTOLIC BLOOD PRESSURE: 149 MMHG | WEIGHT: 160 LBS | RESPIRATION RATE: 19 BRPM | TEMPERATURE: 99 F | HEIGHT: 71 IN

## 2024-07-22 LAB
EKG ATRIAL RATE: 48 BPM
EKG DIAGNOSIS: NORMAL
EKG P AXIS: 80 DEGREES
EKG P-R INTERVAL: 168 MS
EKG Q-T INTERVAL: 560 MS
EKG QRS DURATION: 130 MS
EKG QTC CALCULATION (BAZETT): 500 MS
EKG R AXIS: 77 DEGREES
EKG T AXIS: 69 DEGREES
EKG VENTRICULAR RATE: 48 BPM
GLUCOSE BLD STRIP.AUTO-MCNC: 145 MG/DL (ref 70–110)
GLUCOSE BLD STRIP.AUTO-MCNC: 70 MG/DL (ref 70–110)
GLUCOSE BLD STRIP.AUTO-MCNC: 77 MG/DL (ref 70–110)
LACTATE SERPL-SCNC: 1.5 MMOL/L (ref 0.4–2)
TSH SERPL DL<=0.05 MIU/L-ACNC: 0.94 UIU/ML (ref 0.36–3.74)
VANCOMYCIN SERPL-MCNC: 22.3 UG/ML (ref 5–40)

## 2024-07-22 PROCEDURE — 83605 ASSAY OF LACTIC ACID: CPT

## 2024-07-22 PROCEDURE — 84443 ASSAY THYROID STIM HORMONE: CPT

## 2024-07-22 PROCEDURE — 2580000003 HC RX 258: Performed by: EMERGENCY MEDICINE

## 2024-07-22 PROCEDURE — 96367 TX/PROPH/DG ADDL SEQ IV INF: CPT

## 2024-07-22 PROCEDURE — 82962 GLUCOSE BLOOD TEST: CPT

## 2024-07-22 PROCEDURE — 80202 ASSAY OF VANCOMYCIN: CPT

## 2024-07-22 PROCEDURE — 93010 ELECTROCARDIOGRAM REPORT: CPT | Performed by: INTERNAL MEDICINE

## 2024-07-22 RX ADMIN — DEXTROSE MONOHYDRATE 250 ML: 100 INJECTION, SOLUTION INTRAVENOUS at 03:41

## 2024-07-22 NOTE — ED NOTES
Gave more orange juice and some peanut butter sandwiches to pt. Pt still lethargic. Pt BG is 70. As per provider give 250 ml D10 over 15 min. Pt responds well and temp is rising.

## 2024-07-22 NOTE — ED PROVIDER NOTES
1.3 0.8 - 1.7     ETOH    Collection Time: 07/21/24  7:55 PM   Result Value Ref Range    Ethanol Lvl 199 (H) 0 - 3 MG/DL   Troponin    Collection Time: 07/21/24  7:55 PM   Result Value Ref Range    Troponin, High Sensitivity 8 0 - 78 ng/L   CK    Collection Time: 07/21/24  7:55 PM   Result Value Ref Range    Total  (H) 39 - 308 U/L   POCT Glucose    Collection Time: 07/21/24  8:08 PM   Result Value Ref Range    POC Glucose 85 70 - 110 mg/dL   Lactic Acid    Collection Time: 07/21/24  8:15 PM   Result Value Ref Range    Lactic Acid, Plasma 4.5 (HH) 0.4 - 2.0 MMOL/L   POCT Glucose    Collection Time: 07/21/24  8:31 PM   Result Value Ref Range    POC Glucose 145 (H) 70 - 110 mg/dL   Urinalysis    Collection Time: 07/21/24  9:21 PM   Result Value Ref Range    Color, UA YELLOW      Appearance CLEAR      Specific Gravity, UA 1.011 1.005 - 1.030      pH, Urine 5.5 5.0 - 8.0      Protein, UA Negative NEG mg/dL    Glucose, Ur Negative NEG mg/dL    Ketones, Urine 15 (A) NEG mg/dL    Bilirubin, Urine Negative NEG      Blood, Urine Negative NEG      Urobilinogen, Urine 0.2 0.2 - 1.0 EU/dL    Nitrite, Urine Negative NEG      Leukocyte Esterase, Urine Negative NEG     Urine Drug Screen    Collection Time: 07/21/24  9:21 PM   Result Value Ref Range    Benzodiazepines, Urine Negative NEG      Barbiturates, Urine Negative NEG      THC, TH-Cannabinol, Urine Positive (A) NEG      Opiates, Urine Negative NEG      Phencyclidine, Urine Negative NEG      Cocaine, Urine Negative NEG      Amphetamine, Urine Negative NEG      Methadone, Urine Negative NEG      Comments: (NOTE)    Lactic Acid    Collection Time: 07/21/24  9:30 PM   Result Value Ref Range    Lactic Acid, Plasma 4.1 (HH) 0.4 - 2.0 MMOL/L       Radiologic Studies:   CT Head W/O Contrast   Final Result   1. No acute intracranial abnormalities      Electronically signed by Paco TRIMBLE CSpine W/O Contrast   Final Result      1. No acute cervical spine injuries     Range    Benzodiazepines, Urine Negative NEG      Barbiturates, Urine Negative NEG      THC, TH-Cannabinol, Urine Positive (A) NEG      Opiates, Urine Negative NEG      Phencyclidine, Urine Negative NEG      Cocaine, Urine Negative NEG      Amphetamine, Urine Negative NEG      Methadone, Urine Negative NEG      Comments: (NOTE)    Lactic Acid    Collection Time: 07/21/24  9:30 PM   Result Value Ref Range    Lactic Acid, Plasma 4.1 (HH) 0.4 - 2.0 MMOL/L       Radiologic Studies:   XR CHEST PORTABLE    (Results Pending)   CT Head W/O Contrast    (Results Pending)   CT CSpine W/O Contrast    (Results Pending)       EKG interpretation: (Preliminary)  EKG read by Dr. Danielle Elias at ***     Procedures     Procedures    ED Course     10:18 PM EDT I (Danielle Elias MD) am the first provider for this patient. Initial assessment performed. I reviewed the vital signs, available nursing notes, past medical history, past surgical history, family history and social history. The patients presenting problems have been discussed, and they are in agreement with the care plan formulated and outlined with them.  I have encouraged them to ask questions as they arise throughout their visit.    Records Reviewed: {Records Reviewed:38446::\"Nursing Notes\"}    Is this patient to be included in the SEP-1 core measure? {Sep-1 Core Yes/No:855293}    MEDICATIONS ADMINISTERED IN THE ED:  Medications   vancomycin (VANCOCIN) 1500 mg in sodium chloride 0.9% 500 mL IVPB (1,500 mg IntraVENous New Bag 7/21/24 2148)   vancomycin (VANCOCIN) 1,000 mg in sodium chloride 0.9 % 250 mL IVPB ([id number]) (has no administration in time range)   dextrose bolus 10% 250 mL (0 mLs IntraVENous Stopped 7/21/24 2045)   sodium chloride 0.9 % bolus 1,000 mL (0 mLs IntraVENous Stopped 7/21/24 2133)   piperacillin-tazobactam (ZOSYN) 4,500 mg in sodium chloride 0.9 % 100 mL IVPB (mini-bag) (0 mg IntraVENous Stopped 7/21/24 2218)            Medical Decision Making     CC/HPI

## 2024-07-22 NOTE — ED NOTES
..Bedside and Verbal shift change report received from SantanaRN (off going nurse)  given to CHLOÉ Keller  (Oncoming nurse). Report included the following information ED SBAR.

## 2024-07-22 NOTE — ED NOTES
As per provider to give bolus of 2100 ml NS through rapid infuser with warmer. Pt still on bear hugger

## 2024-07-22 NOTE — DISCHARGE INSTRUCTIONS
Substance Abuse Programs & Outpatient Services  [facility]  [address] [place]  [place], [place] [address] [phone]   [facility] & [facility]  [address] [place], [place] [address] [phone]  *Must be a [place] Resident or have homeless status in [facility] Abuse Services  [phone] intake  [email or web address]    Second Chances  [facility] Bld[facility]  [address]  [place], [place] [address] [phone]    [facility]  [address]  [place], [place] [address] [phone]   $20/wk for 8 wk course.  Certificate of completion    [facility]  [phone]  Intake to initiate services:  [phone]    [facility]  [address], [place]  [phone]   If not a  resident, must have referral from [facility] for detox     [facility]  [address]  [place], [place]  [phone], intake:  [phone]   Private pay    [facility]  [address]  [place] [address] [phone]    [facility]  [address] [place]  [address] [phone]   Intensive Outpatient Substance Abuse Program (3 x wk, 3 hrs per day)  [email or web address]    [facility]  [address]  [place], [place]  [phone]   18 wk program/groups, Tues & Thurs Evenings)  [email or web address]    [facility]  [address] [place] [address] [phone]  [email or web address]    [place]    Methadone Maintenance Clinic  [phone]    [facility]  [address],  [place], [place]  [phone]    [place] at [address] [address] [phone]    [place] / [place]    H/NN [facility]  and Medicaid only. Day treatment on case by case basis. Average length of stay pregnancy period + 60 days. NO DETOX.   [facility]  [phone]  [address] [place] [address]. Males only ages 13-17. $1950/month. Program length 12 months. No insurances accepted.   [facility]   [phone] [address] [place] [address].  Medical detox  (10 days).  Cost: FREE. Length: 7-9 months with 3 month transition period.  Men Only.   [facility]  [phone]  [address] [place]  [address].   Program length: Men: 12-17 months (4months in [place], [place] plus 13 months in [place]). Application Fee $100. Induction Fee $2400 (1/2 at admission remainder over next 4 months). Cha based/work program. No Co-occurring.    [facility] [phone]  [address] [place]  [address].   Program length: 12-18 months  in [place]. Application Fee $100. Induction Fee $2400 (1/2 at admission remainder over next 4 months). Cha based/work program. No Co-occurring.    [facility] [phone] [address] [place]  [address]. Detox-( 7 day average, $6000). Residential length of stay 21-28 days @ $6000/week for weeks 1,2 and 4. No Medicaid. No Medicare. Major Insurances Accepted: Waddy, Optima, , Blue Cross, Cigna. Recommend  [employer] for funding assistance.    Licensed Provider Search [email or web address].[email or web address]      RESIDENTIAL TREATMENT PROGRAMS OUT OF STATE   [facility]: [phone].  Multiple Locations.  Accepts most private insurance, but does not accept Medicare or Medicaid.   [facility]: [phone].  Multiple Locations.  Accepts most private insurance, but does not accept Medicare or Medicaid.     [facility]: [phone].  [facility] [place] [address]. Self-pay ($1970 for 30 days, $3270 for 60 days, and $4580 for 90 days).

## 2024-07-22 NOTE — PROGRESS NOTES
[facility]   Pharmacy Pharmacokinetic Monitoring Service - Vancomycin     Arnold Keys is a 54 y.o. male starting on vancomycin therapy for Sepsis of Unknown Etiology. Pharmacy was consulted for monitoring and adjustment.    Target Concentration: Goal AUC/ASHLEY 400-600 mg*hr/L    Additional Antimicrobials: Piperacillin/Tazobactam    Pertinent Laboratory Values:   Temp: (!) 91.1 °F (32.8 °C), Weight - Scale: 72.6 kg (160 lb)  Recent Labs     07/21/24 1955   CREATININE 1.00   BUN 12   WBC 7.7     Estimated Creatinine Clearance: 88 mL/min (based on SCr of 1 mg/dL).    Pertinent Cultures:  Culture Date Source Results   07/21 Blood x 2 IP   07/21 UA IP   MRSA Nasal Swab: Not ordered. Order placed by pharmacy    Plan:  Dosing recommendations based on Bayesian software  Start vancomycin 1500 mg x 1, then 1000 mg q12h  Anticipated AUC of 484 and trough concentration of 13.2 at steady state  Renal labs as indicated   Vancomycin concentration ordered for AM labs tomorrow  Pharmacy will continue to monitor patient and adjust therapy as indicated    Thank you for the consult,  MELI VALE RPH  7/21/2024

## 2024-07-22 NOTE — ED PROVIDER NOTES
Dr. Trujillo taking for patient care.  On review patient seen here due to hypoglycemia and hypothermia.  Was placed on the reina hugger initially and had provided patient with D10 bolus as well as p.o. intake for treatment is hypoglycemia.  Patient has been off of the Reina hugger for multiple hours and temperature has remained consistent.  Patient's lab work found to have an elevated alcohol level otherwise grossly within normal limits.  Believe patient's likely hypoglycemia and hypothermia secondary to alcohol use/abuse.  On reevaluation patient states that he feels overall well.  Has had symptoms like this previously.  Lactic acid which was initially elevated now normalized.  Low suspicion for infectious etiology of his symptoms.  Patient was provided with empiric IV antibiotics.  Per previous provider if patient's lactic acid was to normalize plan was to discharge.  Believe this to be appropriate.  Will discharge patient home with strict return precautions and follow-up recommendations.  Patient verbalized understanding and is without further questions.  Comfortable with plan.     Tee Trujillo Jr., DO  07/22/24 0805

## 2024-07-22 NOTE — ED NOTES
Pt requested orange juice. Provided. Fixed bear hugger on pt as pt tossing and turning bunched up sheet and it wast working correctly

## 2024-07-23 LAB
BACTERIA SPEC CULT: NORMAL
BACTERIA SPEC CULT: NORMAL
SERVICE CMNT-IMP: NORMAL

## 2024-08-22 ENCOUNTER — APPOINTMENT (OUTPATIENT)
Facility: HOSPITAL | Age: 54
End: 2024-08-22
Payer: COMMERCIAL

## 2024-08-22 ENCOUNTER — HOSPITAL ENCOUNTER (EMERGENCY)
Facility: HOSPITAL | Age: 54
Discharge: HOME | End: 2024-08-22
Attending: EMERGENCY MEDICINE
Payer: COMMERCIAL

## 2024-08-22 VITALS
OXYGEN SATURATION: 100 % | WEIGHT: 160 LBS | SYSTOLIC BLOOD PRESSURE: 171 MMHG | RESPIRATION RATE: 16 BRPM | BODY MASS INDEX: 22.4 KG/M2 | DIASTOLIC BLOOD PRESSURE: 99 MMHG | HEART RATE: 50 BPM | TEMPERATURE: 98.5 F | HEIGHT: 71 IN

## 2024-08-22 DIAGNOSIS — N23 RENAL COLIC: Primary | ICD-10-CM

## 2024-08-22 LAB
ALBUMIN SERPL-MCNC: 4.2 G/DL (ref 3.4–5)
ALBUMIN/GLOB SERPL: 1.1 (ref 0.8–1.7)
ALP SERPL-CCNC: 82 U/L (ref 45–117)
ALT SERPL-CCNC: 31 U/L (ref 16–61)
ANION GAP SERPL CALC-SCNC: 9 MMOL/L (ref 3–18)
APPEARANCE UR: CLEAR
AST SERPL-CCNC: 32 U/L (ref 10–38)
BACTERIA URNS QL MICRO: NEGATIVE /HPF
BASOPHILS # BLD: 0 K/UL (ref 0–0.1)
BASOPHILS NFR BLD: 0 % (ref 0–2)
BILIRUB SERPL-MCNC: 0.7 MG/DL (ref 0.2–1)
BILIRUB UR QL: NEGATIVE
BUN SERPL-MCNC: 15 MG/DL (ref 7–18)
BUN/CREAT SERPL: 14 (ref 12–20)
CALCIUM SERPL-MCNC: 9.6 MG/DL (ref 8.5–10.1)
CHLORIDE SERPL-SCNC: 104 MMOL/L (ref 100–111)
CO2 SERPL-SCNC: 27 MMOL/L (ref 21–32)
COLOR UR: YELLOW
CREAT SERPL-MCNC: 1.1 MG/DL (ref 0.6–1.3)
DIFFERENTIAL METHOD BLD: ABNORMAL
EOSINOPHIL # BLD: 0.3 K/UL (ref 0–0.4)
EOSINOPHIL NFR BLD: 3 % (ref 0–5)
EPITH CASTS URNS QL MICRO: NORMAL /LPF (ref 0–5)
ERYTHROCYTE [DISTWIDTH] IN BLOOD BY AUTOMATED COUNT: 13.2 % (ref 11.6–14.5)
GLOBULIN SER CALC-MCNC: 3.9 G/DL (ref 2–4)
GLUCOSE SERPL-MCNC: 78 MG/DL (ref 74–99)
GLUCOSE UR STRIP.AUTO-MCNC: NEGATIVE MG/DL
HCT VFR BLD AUTO: 38.6 % (ref 36–48)
HGB BLD-MCNC: 12.8 G/DL (ref 13–16)
HGB UR QL STRIP: NEGATIVE
IMM GRANULOCYTES # BLD AUTO: 0 K/UL (ref 0–0.04)
IMM GRANULOCYTES NFR BLD AUTO: 0 % (ref 0–0.5)
KETONES UR QL STRIP.AUTO: NEGATIVE MG/DL
LEUKOCYTE ESTERASE UR QL STRIP.AUTO: NEGATIVE
LIPASE SERPL-CCNC: 48 U/L (ref 13–75)
LYMPHOCYTES # BLD: 2.3 K/UL (ref 0.9–3.6)
LYMPHOCYTES NFR BLD: 29 % (ref 21–52)
MCH RBC QN AUTO: 33.7 PG (ref 24–34)
MCHC RBC AUTO-ENTMCNC: 33.2 G/DL (ref 31–37)
MCV RBC AUTO: 101.6 FL (ref 78–100)
MONOCYTES # BLD: 0.7 K/UL (ref 0.05–1.2)
MONOCYTES NFR BLD: 8 % (ref 3–10)
NEUTS SEG # BLD: 4.8 K/UL (ref 1.8–8)
NEUTS SEG NFR BLD: 59 % (ref 40–73)
NITRITE UR QL STRIP.AUTO: NEGATIVE
NRBC # BLD: 0 K/UL (ref 0–0.01)
NRBC BLD-RTO: 0 PER 100 WBC
PH UR STRIP: 5.5 (ref 5–8)
PLATELET # BLD AUTO: 191 K/UL (ref 135–420)
PMV BLD AUTO: 10.7 FL (ref 9.2–11.8)
POTASSIUM SERPL-SCNC: 3.7 MMOL/L (ref 3.5–5.5)
PROT SERPL-MCNC: 8.1 G/DL (ref 6.4–8.2)
PROT UR STRIP-MCNC: 30 MG/DL
RBC # BLD AUTO: 3.8 M/UL (ref 4.35–5.65)
RBC #/AREA URNS HPF: NORMAL /HPF (ref 0–5)
SODIUM SERPL-SCNC: 140 MMOL/L (ref 136–145)
SP GR UR REFRACTOMETRY: 1.02 (ref 1–1.03)
UROBILINOGEN UR QL STRIP.AUTO: 0.2 EU/DL (ref 0.2–1)
WBC # BLD AUTO: 8 K/UL (ref 4.6–13.2)
WBC URNS QL MICRO: NORMAL /HPF (ref 0–4)

## 2024-08-22 PROCEDURE — 85025 COMPLETE CBC W/AUTO DIFF WBC: CPT

## 2024-08-22 PROCEDURE — 74176 CT ABD & PELVIS W/O CONTRAST: CPT

## 2024-08-22 PROCEDURE — 83690 ASSAY OF LIPASE: CPT

## 2024-08-22 PROCEDURE — 6360000002 HC RX W HCPCS: Performed by: EMERGENCY MEDICINE

## 2024-08-22 PROCEDURE — 99284 EMERGENCY DEPT VISIT MOD MDM: CPT

## 2024-08-22 PROCEDURE — 80053 COMPREHEN METABOLIC PANEL: CPT

## 2024-08-22 PROCEDURE — 81001 URINALYSIS AUTO W/SCOPE: CPT

## 2024-08-22 PROCEDURE — 96374 THER/PROPH/DIAG INJ IV PUSH: CPT

## 2024-08-22 RX ORDER — TAMSULOSIN HYDROCHLORIDE 0.4 MG/1
0.4 CAPSULE ORAL DAILY
Qty: 30 CAPSULE | Refills: 5 | Status: SHIPPED | OUTPATIENT
Start: 2024-08-22

## 2024-08-22 RX ORDER — IBUPROFEN 600 MG/1
600 TABLET ORAL 3 TIMES DAILY PRN
Qty: 20 TABLET | Refills: 0 | Status: SHIPPED | OUTPATIENT
Start: 2024-08-22

## 2024-08-22 RX ORDER — MORPHINE SULFATE 4 MG/ML
4 INJECTION, SOLUTION INTRAMUSCULAR; INTRAVENOUS
Status: COMPLETED | OUTPATIENT
Start: 2024-08-22 | End: 2024-08-22

## 2024-08-22 RX ADMIN — MORPHINE SULFATE 4 MG: 4 INJECTION, SOLUTION INTRAMUSCULAR; INTRAVENOUS at 06:51

## 2024-08-22 ASSESSMENT — PAIN SCALES - GENERAL: PAINLEVEL_OUTOF10: 10

## 2024-08-22 ASSESSMENT — PAIN DESCRIPTION - LOCATION
LOCATION: FLANK
LOCATION: FLANK

## 2024-08-22 ASSESSMENT — PAIN - FUNCTIONAL ASSESSMENT: PAIN_FUNCTIONAL_ASSESSMENT: 0-10

## 2024-08-22 ASSESSMENT — PAIN DESCRIPTION - ORIENTATION
ORIENTATION: LEFT
ORIENTATION: LEFT

## 2024-08-22 NOTE — ED PROVIDER NOTES
EMERGENCY DEPARTMENT HISTORY AND PHYSICAL EXAM      Patient Name: Arnold Keys  MRN: [id number]  YOB: 1970  Provider: Danielle Elias MD  PCP: Kenney Hill MD   Time/Date of evaluation: 6:29 AM EDT on 8/22/24    History of Presenting Illness     Chief Complaint   Patient presents with    Flank Pain       History Provided By: Patient     History (Narative):   Arnold Keys is a 54 y.o. male with a PMHX of alcoholism  who presents to the emergency department  by EMS C/O left-sided pain in his kidney area.  It began at 2 AM.  He denies any prior history of kidney stones.  He denies any nausea or vomiting.  He states that he has had some chills.  He denies any diarrhea.  He rates his pain as a 10 out of 10.        Past History     Past Medical History:  Past Medical History:   Diagnosis Date    Alcoholism (HCC)     Ill-defined condition     ETOH    Marijuana smoker        Past Surgical History:  No past surgical history on file.    Family History:  Family History   Problem Relation Age of Onset    Diabetes type 2  Mother        Social History:  Social History     Tobacco Use    Smoking status: Former     Passive exposure: Never   Substance Use Topics    Alcohol use: Yes     Comment: one pint a day    Drug use: Yes     Types: Marijuana (Weed)     Comment: daily       Medications:  Current Facility-Administered Medications   Medication Dose Route Frequency Provider Last Rate Last Admin    morphine sulfate (PF) injection 4 mg  4 mg IntraVENous NOW Danielle Elisa MD         Current Outpatient Medications   Medication Sig Dispense Refill    ibuprofen (ADVIL;MOTRIN) 600 MG tablet Take 1 tablet by mouth 3 times daily as needed for Pain 20 tablet 0    tamsulosin (FLOMAX) 0.4 MG capsule Take 1 capsule by mouth daily 30 capsule 5    Multiple Vitamin (MVI, CELEBRATE, CHEWABLE TABLET) Take 1 tablet by mouth daily 30 tablet 0    vitamin B-1 (THIAMINE) 100 MG tablet Take 1 tablet by mouth daily 30 tablet 3

## 2024-10-16 ENCOUNTER — APPOINTMENT (OUTPATIENT)
Facility: HOSPITAL | Age: 54
End: 2024-10-16
Payer: COMMERCIAL

## 2024-10-16 ENCOUNTER — HOSPITAL ENCOUNTER (EMERGENCY)
Facility: HOSPITAL | Age: 54
Discharge: HOME | End: 2024-10-16
Payer: COMMERCIAL

## 2024-10-16 VITALS
DIASTOLIC BLOOD PRESSURE: 93 MMHG | TEMPERATURE: 98.1 F | HEIGHT: 71 IN | OXYGEN SATURATION: 100 % | BODY MASS INDEX: 22.4 KG/M2 | SYSTOLIC BLOOD PRESSURE: 150 MMHG | WEIGHT: 160 LBS | RESPIRATION RATE: 16 BRPM | HEART RATE: 62 BPM
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
## 2024-10-16 DIAGNOSIS — Z59.00 HOMELESSNESS: ICD-10-CM

## 2024-10-16 DIAGNOSIS — E83.42 HYPOMAGNESEMIA: ICD-10-CM

## 2024-10-16 DIAGNOSIS — E16.2 HYPOGLYCEMIA: Primary | ICD-10-CM

## 2024-10-16 LAB
ALBUMIN SERPL-MCNC: 4.1 G/DL (ref 3.4–5)
ALBUMIN/GLOB SERPL: 1.1 (ref 0.8–1.7)
ALP SERPL-CCNC: 76 U/L (ref 45–117)
ALT SERPL-CCNC: 40 U/L (ref 16–61)
ANION GAP SERPL CALC-SCNC: 8 MMOL/L (ref 3–18)
AST SERPL-CCNC: 50 U/L (ref 10–38)
BASOPHILS # BLD: 0.1 K/UL (ref 0–0.1)
BASOPHILS NFR BLD: 1 % (ref 0–2)
BILIRUB SERPL-MCNC: 1 MG/DL (ref 0.2–1)
BUN SERPL-MCNC: 13 MG/DL (ref 7–18)
BUN/CREAT SERPL: 14 (ref 12–20)
CALCIUM SERPL-MCNC: 9.3 MG/DL (ref 8.5–10.1)
CHLORIDE SERPL-SCNC: 108 MMOL/L (ref 100–111)
CO2 SERPL-SCNC: 27 MMOL/L (ref 21–32)
CREAT SERPL-MCNC: 0.9 MG/DL (ref 0.6–1.3)
DIFFERENTIAL METHOD BLD: ABNORMAL
EKG ATRIAL RATE: 49 BPM
EKG DIAGNOSIS: NORMAL
EKG P AXIS: 75 DEGREES
EKG P-R INTERVAL: 150 MS
EKG Q-T INTERVAL: 464 MS
EKG QRS DURATION: 100 MS
EKG QTC CALCULATION (BAZETT): 419 MS
EKG R AXIS: 70 DEGREES
EKG T AXIS: 75 DEGREES
EKG VENTRICULAR RATE: 49 BPM
EOSINOPHIL # BLD: 0.3 K/UL (ref 0–0.4)
EOSINOPHIL NFR BLD: 4 % (ref 0–5)
ERYTHROCYTE [DISTWIDTH] IN BLOOD BY AUTOMATED COUNT: 13 % (ref 11.6–14.5)
EST. AVERAGE GLUCOSE BLD GHB EST-MCNC: 94 MG/DL
ETHANOL SERPL-MCNC: 73 MG/DL (ref 0–3)
GLOBULIN SER CALC-MCNC: 3.7 G/DL (ref 2–4)
GLUCOSE BLD STRIP.AUTO-MCNC: 122 MG/DL (ref 70–110)
GLUCOSE BLD STRIP.AUTO-MCNC: 45 MG/DL (ref 70–110)
GLUCOSE BLD STRIP.AUTO-MCNC: 78 MG/DL (ref 70–110)
GLUCOSE SERPL-MCNC: 47 MG/DL (ref 74–99)
HBA1C MFR BLD: 4.9 % (ref 4.2–5.6)
HCT VFR BLD AUTO: 38.7 % (ref 36–48)
HGB BLD-MCNC: 12.7 G/DL (ref 13–16)
IMM GRANULOCYTES # BLD AUTO: 0 K/UL (ref 0–0.04)
IMM GRANULOCYTES NFR BLD AUTO: 0 % (ref 0–0.5)
LYMPHOCYTES # BLD: 2.7 K/UL (ref 0.9–3.6)
LYMPHOCYTES NFR BLD: 31 % (ref 21–52)
MAGNESIUM SERPL-MCNC: 1.5 MG/DL (ref 1.6–2.6)
MCH RBC QN AUTO: 33.6 PG (ref 24–34)
MCHC RBC AUTO-ENTMCNC: 32.8 G/DL (ref 31–37)
MCV RBC AUTO: 102.4 FL (ref 78–100)
MONOCYTES # BLD: 0.6 K/UL (ref 0.05–1.2)
MONOCYTES NFR BLD: 7 % (ref 3–10)
NEUTS SEG # BLD: 5 K/UL (ref 1.8–8)
NEUTS SEG NFR BLD: 57 % (ref 40–73)
NRBC # BLD: 0 K/UL (ref 0–0.01)
NRBC BLD-RTO: 0 PER 100 WBC
NT PRO BNP: 33 PG/ML (ref 0–900)
PLATELET # BLD AUTO: 215 K/UL (ref 135–420)
PMV BLD AUTO: 10.3 FL (ref 9.2–11.8)
POTASSIUM SERPL-SCNC: 3.4 MMOL/L (ref 3.5–5.5)
PROT SERPL-MCNC: 7.8 G/DL (ref 6.4–8.2)
RBC # BLD AUTO: 3.78 M/UL (ref 4.35–5.65)
SODIUM SERPL-SCNC: 143 MMOL/L (ref 136–145)
TROPONIN I SERPL HS-MCNC: 9 NG/L (ref 0–78)
WBC # BLD AUTO: 8.8 K/UL (ref 4.6–13.2)

## 2024-10-16 PROCEDURE — 93005 ELECTROCARDIOGRAM TRACING: CPT | Performed by: EMERGENCY MEDICINE

## 2024-10-16 PROCEDURE — 94761 N-INVAS EAR/PLS OXIMETRY MLT: CPT

## 2024-10-16 PROCEDURE — 96365 THER/PROPH/DIAG IV INF INIT: CPT

## 2024-10-16 PROCEDURE — 71045 X-RAY EXAM CHEST 1 VIEW: CPT

## 2024-10-16 PROCEDURE — 83880 ASSAY OF NATRIURETIC PEPTIDE: CPT

## 2024-10-16 PROCEDURE — 85025 COMPLETE CBC W/AUTO DIFF WBC: CPT

## 2024-10-16 PROCEDURE — 6360000002 HC RX W HCPCS: Performed by: EMERGENCY MEDICINE

## 2024-10-16 PROCEDURE — 83735 ASSAY OF MAGNESIUM: CPT

## 2024-10-16 PROCEDURE — 80053 COMPREHEN METABOLIC PANEL: CPT

## 2024-10-16 PROCEDURE — 93010 ELECTROCARDIOGRAM REPORT: CPT | Performed by: INTERNAL MEDICINE

## 2024-10-16 PROCEDURE — 99285 EMERGENCY DEPT VISIT HI MDM: CPT

## 2024-10-16 PROCEDURE — 84484 ASSAY OF TROPONIN QUANT: CPT

## 2024-10-16 PROCEDURE — 83036 HEMOGLOBIN GLYCOSYLATED A1C: CPT

## 2024-10-16 PROCEDURE — 82077 ASSAY SPEC XCP UR&BREATH IA: CPT

## 2024-10-16 PROCEDURE — 82962 GLUCOSE BLOOD TEST: CPT

## 2024-10-16 RX ORDER — MAGNESIUM SULFATE IN WATER 40 MG/ML
2000 INJECTION, SOLUTION INTRAVENOUS
Status: COMPLETED | OUTPATIENT
Start: 2024-10-16 | End: 2024-10-16

## 2024-10-16 RX ADMIN — MAGNESIUM SULFATE HEPTAHYDRATE 2000 MG: 40 INJECTION, SOLUTION INTRAVENOUS at 09:43

## 2024-10-16 SDOH — ECONOMIC STABILITY - HOUSING INSECURITY: HOMELESSNESS UNSPECIFIED: Z59.00

## 2024-10-16 ASSESSMENT — ENCOUNTER SYMPTOMS
EYES NEGATIVE: 1
ABDOMINAL PAIN: 0
ALLERGIC/IMMUNOLOGIC NEGATIVE: 1
VOMITING: 0
SHORTNESS OF BREATH: 1
NAUSEA: 0

## 2024-10-16 ASSESSMENT — PAIN - FUNCTIONAL ASSESSMENT: PAIN_FUNCTIONAL_ASSESSMENT: NONE - DENIES PAIN

## 2024-10-16 NOTE — ED TRIAGE NOTES
Pt brought to ED by EMS c/o dizziness and fatigue while outside 7/11. BG per EMS 68.    Pt states his last meal was last night.    Hx of HTN.  A&Ox4.    POC BG 45.

## 2024-10-16 NOTE — ED PROVIDER NOTES
[facility] EMERGENCY DEPT  EMERGENCY DEPARTMENT ENCOUNTER      Pt Name: Arnold Keys  MRN: [id number]  Birthdate 1970  Date of evaluation: 10/16/2024  Provider: ALBERTO Kothari  9:21 AM    CHIEF COMPLAINT       Chief Complaint   Patient presents with    Dizziness    Fatigue    Hypoglycemia         HISTORY OF PRESENT ILLNESS    Arnold Keys is a 54 y.o. male who presents to the emergency department with a complaint of feeling dizzy this morning around 630.  He said he is currently homeless but stays at friends homes and he goes to the [employer] for breakfast and lunch.  Breakfast is served at 7 and he said he did not eat a late snack last night.  He occasionally gets dizzy between meals and is not a diabetic.  Occasionally he also feels short of breath.  Denies any chest pain or nauseousness melena hematochezia cough rash or fever dysuria.    HPI    Nursing Notes were reviewed.    REVIEW OF SYSTEMS       Review of Systems   Constitutional:  Negative for appetite change and fever.   HENT: Negative.     Eyes: Negative.    Respiratory:  Positive for shortness of breath.    Cardiovascular:  Negative for chest pain and leg swelling.   Gastrointestinal:  Negative for abdominal pain, nausea and vomiting.   Endocrine: Negative.    Genitourinary:  Negative for dysuria.   Musculoskeletal: Negative.    Skin:  Negative for rash.   Allergic/Immunologic: Negative.    Neurological:  Positive for light-headedness.   Hematological:  Does not bruise/bleed easily.   Psychiatric/Behavioral: Negative.         Except as noted above the remainder of the review of systems was reviewed and negative.       PAST MEDICAL HISTORY     Past Medical History:   Diagnosis Date    Alcoholism (HCC)     Ill-defined condition     ETOH    Marijuana smoker          SURGICAL HISTORY     No past surgical history on file.      CURRENT MEDICATIONS       Previous Medications    ACETAMINOPHEN (TYLENOL) 325 MG TABLET    Take 650 mg by mouth every 6 hours as

## 2024-11-15 ENCOUNTER — HOSPITAL ENCOUNTER (EMERGENCY)
Facility: HOSPITAL | Age: 54
Discharge: HOME | End: 2024-11-15
Attending: EMERGENCY MEDICINE
Payer: COMMERCIAL

## 2024-11-15 ENCOUNTER — APPOINTMENT (OUTPATIENT)
Facility: HOSPITAL | Age: 54
End: 2024-11-15
Payer: COMMERCIAL

## 2024-11-15 VITALS
TEMPERATURE: 98.6 F | HEIGHT: 70 IN | RESPIRATION RATE: 11 BRPM | BODY MASS INDEX: 22.9 KG/M2 | SYSTOLIC BLOOD PRESSURE: 154 MMHG | OXYGEN SATURATION: 97 % | DIASTOLIC BLOOD PRESSURE: 94 MMHG | HEART RATE: 56 BPM | WEIGHT: 160 LBS

## 2024-11-15 DIAGNOSIS — T68.XXXA HYPOTHERMIA, INITIAL ENCOUNTER: ICD-10-CM

## 2024-11-15 DIAGNOSIS — E16.2 HYPOGLYCEMIA: ICD-10-CM

## 2024-11-15 DIAGNOSIS — R53.1 GENERAL WEAKNESS: Primary | ICD-10-CM

## 2024-11-15 LAB
ALBUMIN SERPL-MCNC: 4 G/DL (ref 3.4–5)
ALBUMIN/GLOB SERPL: 1.1 (ref 0.8–1.7)
ALP SERPL-CCNC: 85 U/L (ref 45–117)
ALT SERPL-CCNC: 35 U/L (ref 16–61)
ANION GAP SERPL CALC-SCNC: 14 MMOL/L (ref 3–18)
APPEARANCE UR: CLEAR
AST SERPL-CCNC: 59 U/L (ref 10–38)
B PERT DNA SPEC QL NAA+PROBE: NOT DETECTED
BACTERIA URNS QL MICRO: ABNORMAL /HPF
BASE DEFICIT BLD-SCNC: 2.4 MMOL/L
BASOPHILS # BLD: 0 K/UL (ref 0–0.1)
BASOPHILS NFR BLD: 0 % (ref 0–2)
BILIRUB SERPL-MCNC: 1.2 MG/DL (ref 0.2–1)
BILIRUB UR QL: NEGATIVE
BORDETELLA PARAPERTUSSIS BY PCR: NOT DETECTED
BUN SERPL-MCNC: 13 MG/DL (ref 7–18)
BUN/CREAT SERPL: 13 (ref 12–20)
C PNEUM DNA SPEC QL NAA+PROBE: NOT DETECTED
CALCIUM SERPL-MCNC: 9.6 MG/DL (ref 8.5–10.1)
CHLORIDE SERPL-SCNC: 104 MMOL/L (ref 100–111)
CO2 SERPL-SCNC: 23 MMOL/L (ref 21–32)
COLOR UR: YELLOW
CREAT SERPL-MCNC: 1.04 MG/DL (ref 0.6–1.3)
DIFFERENTIAL METHOD BLD: ABNORMAL
EKG ATRIAL RATE: 43 BPM
EKG DIAGNOSIS: NORMAL
EKG P AXIS: 85 DEGREES
EKG P-R INTERVAL: 150 MS
EKG Q-T INTERVAL: 508 MS
EKG QRS DURATION: 108 MS
EKG QTC CALCULATION (BAZETT): 429 MS
EKG R AXIS: 86 DEGREES
EKG T AXIS: 83 DEGREES
EKG VENTRICULAR RATE: 43 BPM
EOSINOPHIL # BLD: 0.3 K/UL (ref 0–0.4)
EOSINOPHIL NFR BLD: 3 % (ref 0–5)
EPITH CASTS URNS QL MICRO: ABNORMAL /LPF (ref 0–5)
ERYTHROCYTE [DISTWIDTH] IN BLOOD BY AUTOMATED COUNT: 13.2 % (ref 11.6–14.5)
ETHANOL SERPL-MCNC: 19 MG/DL (ref 0–3)
FLUAV SUBTYP SPEC NAA+PROBE: NOT DETECTED
FLUBV RNA SPEC QL NAA+PROBE: NOT DETECTED
GLOBULIN SER CALC-MCNC: 3.7 G/DL (ref 2–4)
GLUCOSE BLD STRIP.AUTO-MCNC: 108 MG/DL (ref 70–110)
GLUCOSE BLD STRIP.AUTO-MCNC: 122 MG/DL (ref 70–110)
GLUCOSE BLD STRIP.AUTO-MCNC: 67 MG/DL (ref 70–110)
GLUCOSE SERPL-MCNC: 75 MG/DL (ref 74–99)
GLUCOSE UR STRIP.AUTO-MCNC: NEGATIVE MG/DL
HADV DNA SPEC QL NAA+PROBE: NOT DETECTED
HCO3 BLD-SCNC: 24.4 MMOL/L (ref 21–28)
HCOV 229E RNA SPEC QL NAA+PROBE: NOT DETECTED
HCOV HKU1 RNA SPEC QL NAA+PROBE: NOT DETECTED
HCOV NL63 RNA SPEC QL NAA+PROBE: NOT DETECTED
HCOV OC43 RNA SPEC QL NAA+PROBE: NOT DETECTED
HCT VFR BLD AUTO: 38.1 % (ref 36–48)
HGB BLD-MCNC: 12.1 G/DL (ref 13–16)
HGB UR QL STRIP: NEGATIVE
HMPV RNA SPEC QL NAA+PROBE: NOT DETECTED
HPIV1 RNA SPEC QL NAA+PROBE: NOT DETECTED
HPIV2 RNA SPEC QL NAA+PROBE: NOT DETECTED
HPIV3 RNA SPEC QL NAA+PROBE: NOT DETECTED
HPIV4 RNA SPEC QL NAA+PROBE: NOT DETECTED
IMM GRANULOCYTES # BLD AUTO: 0 K/UL (ref 0–0.04)
IMM GRANULOCYTES NFR BLD AUTO: 0 % (ref 0–0.5)
KETONES UR QL STRIP.AUTO: 40 MG/DL
LACTATE BLD-SCNC: 2.74 MMOL/L (ref 0.4–2)
LACTATE BLD-SCNC: 5.39 MMOL/L (ref 0.4–2)
LEUKOCYTE ESTERASE UR QL STRIP.AUTO: NEGATIVE
LIPASE SERPL-CCNC: 29 U/L (ref 13–75)
LYMPHOCYTES # BLD: 2.4 K/UL (ref 0.9–3.6)
LYMPHOCYTES NFR BLD: 25 % (ref 21–52)
M PNEUMO DNA SPEC QL NAA+PROBE: NOT DETECTED
MAGNESIUM SERPL-MCNC: 1.7 MG/DL (ref 1.6–2.6)
MCH RBC QN AUTO: 32.4 PG (ref 24–34)
MCHC RBC AUTO-ENTMCNC: 31.8 G/DL (ref 31–37)
MCV RBC AUTO: 102.1 FL (ref 78–100)
MONOCYTES # BLD: 0.5 K/UL (ref 0.05–1.2)
MONOCYTES NFR BLD: 5 % (ref 3–10)
MUCOUS THREADS URNS QL MICRO: ABNORMAL /LPF
NEUTS SEG # BLD: 6.4 K/UL (ref 1.8–8)
NEUTS SEG NFR BLD: 66 % (ref 40–73)
NITRITE UR QL STRIP.AUTO: NEGATIVE
NRBC # BLD: 0 K/UL (ref 0–0.01)
NRBC BLD-RTO: 0 PER 100 WBC
PCO2 BLD: 49.1 MMHG (ref 35–48)
PH BLD: 7.3 (ref 7.35–7.45)
PH UR STRIP: 5.5 (ref 5–8)
PLATELET # BLD AUTO: 216 K/UL (ref 135–420)
PMV BLD AUTO: 10.2 FL (ref 9.2–11.8)
PO2 BLD: 32 MMHG (ref 83–108)
POTASSIUM SERPL-SCNC: 3 MMOL/L (ref 3.5–5.5)
PROT SERPL-MCNC: 7.7 G/DL (ref 6.4–8.2)
PROT UR STRIP-MCNC: 30 MG/DL
RBC # BLD AUTO: 3.73 M/UL (ref 4.35–5.65)
RBC #/AREA URNS HPF: ABNORMAL /HPF (ref 0–5)
RSV RNA SPEC QL NAA+PROBE: NOT DETECTED
RV+EV RNA SPEC QL NAA+PROBE: NOT DETECTED
SAO2 % BLD: 55.6 % (ref 92–97)
SARS-COV-2 RNA RESP QL NAA+PROBE: NOT DETECTED
SERVICE CMNT-IMP: ABNORMAL
SODIUM SERPL-SCNC: 141 MMOL/L (ref 136–145)
SP GR UR REFRACTOMETRY: 1.02 (ref 1–1.03)
SPECIMEN TYPE: ABNORMAL
T4 FREE SERPL-MCNC: 1 NG/DL (ref 0.7–1.5)
TROPONIN I SERPL HS-MCNC: 10 NG/L (ref 0–78)
TROPONIN I SERPL HS-MCNC: 16 NG/L (ref 0–78)
TSH SERPL DL<=0.05 MIU/L-ACNC: 1.15 UIU/ML (ref 0.36–3.74)
UROBILINOGEN UR QL STRIP.AUTO: 0.2 EU/DL (ref 0.2–1)
WBC # BLD AUTO: 9.6 K/UL (ref 4.6–13.2)
WBC URNS QL MICRO: ABNORMAL /HPF (ref 0–5)

## 2024-11-15 PROCEDURE — 84443 ASSAY THYROID STIM HORMONE: CPT

## 2024-11-15 PROCEDURE — 80053 COMPREHEN METABOLIC PANEL: CPT

## 2024-11-15 PROCEDURE — 93005 ELECTROCARDIOGRAM TRACING: CPT | Performed by: EMERGENCY MEDICINE

## 2024-11-15 PROCEDURE — 83605 ASSAY OF LACTIC ACID: CPT

## 2024-11-15 PROCEDURE — 82077 ASSAY SPEC XCP UR&BREATH IA: CPT

## 2024-11-15 PROCEDURE — 94761 N-INVAS EAR/PLS OXIMETRY MLT: CPT

## 2024-11-15 PROCEDURE — 87154 CUL TYP ID BLD PTHGN 6+ TRGT: CPT

## 2024-11-15 PROCEDURE — 84484 ASSAY OF TROPONIN QUANT: CPT

## 2024-11-15 PROCEDURE — 96360 HYDRATION IV INFUSION INIT: CPT

## 2024-11-15 PROCEDURE — 0202U NFCT DS 22 TRGT SARS-COV-2: CPT

## 2024-11-15 PROCEDURE — 96361 HYDRATE IV INFUSION ADD-ON: CPT

## 2024-11-15 PROCEDURE — 71045 X-RAY EXAM CHEST 1 VIEW: CPT

## 2024-11-15 PROCEDURE — 83690 ASSAY OF LIPASE: CPT

## 2024-11-15 PROCEDURE — 93010 ELECTROCARDIOGRAM REPORT: CPT | Performed by: INTERNAL MEDICINE

## 2024-11-15 PROCEDURE — 81001 URINALYSIS AUTO W/SCOPE: CPT

## 2024-11-15 PROCEDURE — 82962 GLUCOSE BLOOD TEST: CPT

## 2024-11-15 PROCEDURE — 84439 ASSAY OF FREE THYROXINE: CPT

## 2024-11-15 PROCEDURE — 2580000003 HC RX 258: Performed by: EMERGENCY MEDICINE

## 2024-11-15 PROCEDURE — 83735 ASSAY OF MAGNESIUM: CPT

## 2024-11-15 PROCEDURE — 99285 EMERGENCY DEPT VISIT HI MDM: CPT

## 2024-11-15 PROCEDURE — 82803 BLOOD GASES ANY COMBINATION: CPT

## 2024-11-15 PROCEDURE — 87040 BLOOD CULTURE FOR BACTERIA: CPT

## 2024-11-15 PROCEDURE — 85025 COMPLETE CBC W/AUTO DIFF WBC: CPT

## 2024-11-15 RX ORDER — 0.9 % SODIUM CHLORIDE 0.9 %
1000 INTRAVENOUS SOLUTION INTRAVENOUS ONCE
Status: COMPLETED | OUTPATIENT
Start: 2024-11-15 | End: 2024-11-15

## 2024-11-15 RX ADMIN — SODIUM CHLORIDE 1000 ML: 9 INJECTION, SOLUTION INTRAVENOUS at 08:54

## 2024-11-15 ASSESSMENT — PAIN - FUNCTIONAL ASSESSMENT: PAIN_FUNCTIONAL_ASSESSMENT: NONE - DENIES PAIN

## 2024-11-15 ASSESSMENT — LIFESTYLE VARIABLES
HOW OFTEN DO YOU HAVE A DRINK CONTAINING ALCOHOL: 4 OR MORE TIMES A WEEK
HOW MANY STANDARD DRINKS CONTAINING ALCOHOL DO YOU HAVE ON A TYPICAL DAY: 1 OR 2

## 2024-11-15 NOTE — ED TRIAGE NOTES
Patient arrived via EMS with c/o generalized weakness onset a hour prior to arrival. BG 44 EMS gave oral glucose

## 2024-11-15 NOTE — DISCHARGE INSTRUCTIONS
Return to the ER for severe pain, difficulty breathing, inability tolerate fluids by mouth, new fever, any other concerning signs or symptoms.

## 2024-11-15 NOTE — ED PROVIDER NOTES
evidence of infection found, will discharge to home at this point. [LK]   1552 Patient notes he is feeling much better, tolerating p.o. without issue, has no recurrent symptoms.  Notes that he has been drinking alcohol every day but does not think has been drinking a significant amount, does admit that he has not been eating very well just been having noodles etc. notes he has been \"eating like a kid.\"  Understands to improve his diet, decrease alcohol intake follow-up with PCM.  Patient agrees with plan will return for any worsening.  No evidence of infection on workup. [LK]      ED Course User Index  [LK] Dell Hathaway MD         CRITICAL CARE TIME   I have spent 35 minutes of critical care time involved in lab review, consultations with specialist, family decision-making, and documentation.  During this entire length of time I was immediately available to the patient.       Critical Care:  The reason for providing this level of medical care for this critically ill patient was due a critical illness that impaired one or more vital organ systems including the endocrine, cardiovascular and pulmonary systems such that there was a high probability of imminent or life threatening deterioration in the patients condition. This care involved high complexity decision making to assess, manipulate, and support vital system functions, to treat this vital organ system failure and to prevent further life threatening deterioration of the patient’s condition.     Aggregate critical care time is exclusive of any separately billable procedures and teaching time.    Dell Hathaway MD      CONSULTS:  None    PROCEDURES:  Unless otherwise noted below, none     Procedures        FINAL IMPRESSION      1. General weakness    2. Hypoglycemia    3. Hypothermia, initial encounter          DISPOSITION/PLAN   DISPOSITION Decision To Discharge 11/15/2024 03:54:04 PM           PATIENT REFERRED TO:  Kenney Hill MD  [address] [place] [address]  [address]  [facility] [id number] [phone]    Schedule an appointment as soon as possible for a visit       [facility] EMERGENCY DEPT  [address] [address] [phone]    As needed, If symptoms worsen      DISCHARGE MEDICATIONS:  Discharge Medication List as of 11/15/2024  3:57 PM        Controlled Substances Monitoring:          No data to display                (Please note that portions of this note were completed with a voice recognition program.  Efforts were made to edit the dictations but occasionally words are mis-transcribed.)    Dell Hathaway MD (electronically signed)  Attending Emergency Physician            Dell Hathaway MD  11/15/24 4094

## 2024-11-16 LAB
ACB COMPLEX DNA BLD POS QL NAA+NON-PROBE: NOT DETECTED
ACCESSION NUMBER, LLC1M: ABNORMAL
B FRAGILIS DNA BLD POS QL NAA+NON-PROBE: NOT DETECTED
BIOFIRE TEST COMMENT: ABNORMAL
C ALBICANS DNA BLD POS QL NAA+NON-PROBE: NOT DETECTED
C AURIS DNA BLD POS QL NAA+NON-PROBE: NOT DETECTED
C GATTII+NEOFOR DNA BLD POS QL NAA+N-PRB: NOT DETECTED
C GLABRATA DNA BLD POS QL NAA+NON-PROBE: NOT DETECTED
C KRUSEI DNA BLD POS QL NAA+NON-PROBE: NOT DETECTED
C PARAP DNA BLD POS QL NAA+NON-PROBE: NOT DETECTED
C TROPICLS DNA BLD POS QL NAA+NON-PROBE: NOT DETECTED
E CLOAC COMP DNA BLD POS NAA+NON-PROBE: NOT DETECTED
E COLI DNA BLD POS QL NAA+NON-PROBE: NOT DETECTED
E FAECALIS DNA BLD POS QL NAA+NON-PROBE: NOT DETECTED
E FAECIUM DNA BLD POS QL NAA+NON-PROBE: NOT DETECTED
ENTEROBACTERALES DNA BLD POS NAA+N-PRB: NOT DETECTED
GP B STREP DNA BLD POS QL NAA+NON-PROBE: NOT DETECTED
HAEM INFLU DNA BLD POS QL NAA+NON-PROBE: NOT DETECTED
K OXYTOCA DNA BLD POS QL NAA+NON-PROBE: NOT DETECTED
KLEBSIELLA SP DNA BLD POS QL NAA+NON-PRB: NOT DETECTED
KLEBSIELLA SP DNA BLD POS QL NAA+NON-PRB: NOT DETECTED
L MONOCYTOG DNA BLD POS QL NAA+NON-PROBE: NOT DETECTED
MECA+MECC ISLT/SPM QL: DETECTED
N MEN DNA BLD POS QL NAA+NON-PROBE: NOT DETECTED
P AERUGINOSA DNA BLD POS NAA+NON-PROBE: NOT DETECTED
PROTEUS SP DNA BLD POS QL NAA+NON-PROBE: NOT DETECTED
RESISTANT GENE TARGETS: ABNORMAL
S AUREUS DNA BLD POS QL NAA+NON-PROBE: NOT DETECTED
S AUREUS+CONS DNA BLD POS NAA+NON-PROBE: DETECTED
S EPIDERMIDIS DNA BLD POS QL NAA+NON-PRB: DETECTED
S LUGDUNENSIS DNA BLD POS QL NAA+NON-PRB: NOT DETECTED
S MALTOPHILIA DNA BLD POS QL NAA+NON-PRB: NOT DETECTED
S MARCESCENS DNA BLD POS NAA+NON-PROBE: NOT DETECTED
S PNEUM DNA BLD POS QL NAA+NON-PROBE: NOT DETECTED
S PYO DNA BLD POS QL NAA+NON-PROBE: NOT DETECTED
SALMONELLA DNA BLD POS QL NAA+NON-PROBE: NOT DETECTED
STREPTOCOCCUS DNA BLD POS NAA+NON-PROBE: NOT DETECTED

## 2024-11-16 NOTE — ED NOTES
Patient's primary phone number was not working, did contact patient's emergency contact who states that she will make sure that he returns to the emergency department as soon as possible.     Aleja Cornejo PA  11/16/24 0863

## 2024-11-17 LAB
BACTERIA SPEC CULT: ABNORMAL
BACTERIA SPEC CULT: NORMAL
GRAM STN SPEC: ABNORMAL
GRAM STN SPEC: ABNORMAL
SERVICE CMNT-IMP: ABNORMAL
SERVICE CMNT-IMP: NORMAL

## 2024-11-21 LAB
BACTERIA SPEC CULT: NORMAL
SERVICE CMNT-IMP: NORMAL

## 2024-12-12 ENCOUNTER — HOSPITAL ENCOUNTER (EMERGENCY)
Facility: HOSPITAL | Age: 54
Discharge: HOME | End: 2024-12-12
Attending: EMERGENCY MEDICINE
Payer: COMMERCIAL

## 2024-12-12 ENCOUNTER — APPOINTMENT (OUTPATIENT)
Facility: HOSPITAL | Age: 54
End: 2024-12-12
Payer: COMMERCIAL

## 2024-12-12 VITALS
OXYGEN SATURATION: 99 % | WEIGHT: 160 LBS | DIASTOLIC BLOOD PRESSURE: 98 MMHG | HEIGHT: 70 IN | BODY MASS INDEX: 22.9 KG/M2 | RESPIRATION RATE: 15 BRPM | HEART RATE: 65 BPM | SYSTOLIC BLOOD PRESSURE: 151 MMHG | TEMPERATURE: 97.9 F

## 2024-12-12 DIAGNOSIS — R06.00 ACUTE DYSPNEA: Primary | ICD-10-CM

## 2024-12-12 DIAGNOSIS — Z59.00 HOMELESSNESS: ICD-10-CM

## 2024-12-12 LAB
ALBUMIN SERPL-MCNC: 3.9 G/DL (ref 3.4–5)
ALBUMIN/GLOB SERPL: 1.1 (ref 0.8–1.7)
ALP SERPL-CCNC: 78 U/L (ref 45–117)
ALT SERPL-CCNC: 43 U/L (ref 16–61)
ANION GAP SERPL CALC-SCNC: 2 MMOL/L (ref 3–18)
AST SERPL-CCNC: 50 U/L (ref 10–38)
BASOPHILS # BLD: 0 K/UL (ref 0–0.1)
BASOPHILS NFR BLD: 1 % (ref 0–2)
BILIRUB SERPL-MCNC: 0.5 MG/DL (ref 0.2–1)
BUN SERPL-MCNC: 17 MG/DL (ref 7–18)
BUN/CREAT SERPL: 19 (ref 12–20)
CALCIUM SERPL-MCNC: 9.2 MG/DL (ref 8.5–10.1)
CHLORIDE SERPL-SCNC: 107 MMOL/L (ref 100–111)
CO2 SERPL-SCNC: 31 MMOL/L (ref 21–32)
CREAT SERPL-MCNC: 0.88 MG/DL (ref 0.6–1.3)
DIFFERENTIAL METHOD BLD: ABNORMAL
EKG ATRIAL RATE: 59 BPM
EKG DIAGNOSIS: NORMAL
EKG P AXIS: 38 DEGREES
EKG P-R INTERVAL: 136 MS
EKG Q-T INTERVAL: 434 MS
EKG QRS DURATION: 104 MS
EKG QTC CALCULATION (BAZETT): 429 MS
EKG R AXIS: 75 DEGREES
EKG T AXIS: 71 DEGREES
EKG VENTRICULAR RATE: 59 BPM
EOSINOPHIL # BLD: 0.3 K/UL (ref 0–0.4)
EOSINOPHIL NFR BLD: 6 % (ref 0–5)
ERYTHROCYTE [DISTWIDTH] IN BLOOD BY AUTOMATED COUNT: 12.8 % (ref 11.6–14.5)
GLOBULIN SER CALC-MCNC: 3.6 G/DL (ref 2–4)
GLUCOSE SERPL-MCNC: 81 MG/DL (ref 74–99)
HCT VFR BLD AUTO: 36.9 % (ref 36–48)
HGB BLD-MCNC: 12.2 G/DL (ref 13–16)
IMM GRANULOCYTES # BLD AUTO: 0 K/UL (ref 0–0.04)
IMM GRANULOCYTES NFR BLD AUTO: 0 % (ref 0–0.5)
LYMPHOCYTES # BLD: 1.7 K/UL (ref 0.9–3.6)
LYMPHOCYTES NFR BLD: 35 % (ref 21–52)
MAGNESIUM SERPL-MCNC: 1.8 MG/DL (ref 1.6–2.6)
MCH RBC QN AUTO: 33.1 PG (ref 24–34)
MCHC RBC AUTO-ENTMCNC: 33.1 G/DL (ref 31–37)
MCV RBC AUTO: 100 FL (ref 78–100)
MONOCYTES # BLD: 0.5 K/UL (ref 0.05–1.2)
MONOCYTES NFR BLD: 10 % (ref 3–10)
NEUTS SEG # BLD: 2.3 K/UL (ref 1.8–8)
NEUTS SEG NFR BLD: 48 % (ref 40–73)
NRBC # BLD: 0 K/UL (ref 0–0.01)
NRBC BLD-RTO: 0 PER 100 WBC
NT PRO BNP: 37 PG/ML (ref 0–900)
PLATELET # BLD AUTO: 187 K/UL (ref 135–420)
PMV BLD AUTO: 10.2 FL (ref 9.2–11.8)
POTASSIUM SERPL-SCNC: 3.4 MMOL/L (ref 3.5–5.5)
PROT SERPL-MCNC: 7.5 G/DL (ref 6.4–8.2)
RBC # BLD AUTO: 3.69 M/UL (ref 4.35–5.65)
SODIUM SERPL-SCNC: 140 MMOL/L (ref 136–145)
TROPONIN I SERPL HS-MCNC: 10 NG/L (ref 0–78)
TROPONIN I SERPL HS-MCNC: 11 NG/L (ref 0–78)
WBC # BLD AUTO: 4.7 K/UL (ref 4.6–13.2)

## 2024-12-12 PROCEDURE — 84484 ASSAY OF TROPONIN QUANT: CPT

## 2024-12-12 PROCEDURE — 80053 COMPREHEN METABOLIC PANEL: CPT

## 2024-12-12 PROCEDURE — 83880 ASSAY OF NATRIURETIC PEPTIDE: CPT

## 2024-12-12 PROCEDURE — 85025 COMPLETE CBC W/AUTO DIFF WBC: CPT

## 2024-12-12 PROCEDURE — 71046 X-RAY EXAM CHEST 2 VIEWS: CPT

## 2024-12-12 PROCEDURE — 93005 ELECTROCARDIOGRAM TRACING: CPT | Performed by: EMERGENCY MEDICINE

## 2024-12-12 PROCEDURE — 99285 EMERGENCY DEPT VISIT HI MDM: CPT

## 2024-12-12 PROCEDURE — 93010 ELECTROCARDIOGRAM REPORT: CPT | Performed by: INTERNAL MEDICINE

## 2024-12-12 PROCEDURE — 83735 ASSAY OF MAGNESIUM: CPT

## 2024-12-12 SDOH — ECONOMIC STABILITY - HOUSING INSECURITY: HOMELESSNESS UNSPECIFIED: Z59.00

## 2024-12-12 ASSESSMENT — PAIN DESCRIPTION - DESCRIPTORS: DESCRIPTORS: SHARP

## 2024-12-12 ASSESSMENT — PAIN - FUNCTIONAL ASSESSMENT: PAIN_FUNCTIONAL_ASSESSMENT: 0-10

## 2024-12-12 ASSESSMENT — PAIN DESCRIPTION - LOCATION: LOCATION: KNEE

## 2024-12-12 ASSESSMENT — PAIN SCALES - GENERAL: PAINLEVEL_OUTOF10: 10

## 2024-12-12 ASSESSMENT — PAIN DESCRIPTION - ORIENTATION: ORIENTATION: RIGHT

## 2024-12-12 NOTE — ED PROVIDER NOTES
EMERGENCY DEPARTMENT HISTORY AND PHYSICAL EXAM      Patient Name: Arnold Keys  MRN: [id number]  YOB: 1970  Provider: Danielle Elias MD  PCP: Unknown, Provider, MD   Time/Date of evaluation: 7:13 AM EST on 12/12/24    History of Presenting Illness     Chief Complaint   Patient presents with    Shortness of Breath       History Provided By: Patient     History (Narative):   Arnold Keys is a 54 y.o. male with a PMHX of asthma, alcoholism, hypertension, marijuana use, and homelessness  who presents to the emergency department  by EMS C/O shortness of breath.  The patient states that it began earlier this morning.  He is homeless and he states that he was wrapped up fairly well.  The temperature dropped somewhat abruptly and he became short of breath for approximately 20 minutes.  He thought he was going to pass out.  Now he is feeling much better.  He states that his chest still feels somewhat tight.  He denies any prior history of heart disease.        Past History     Past Medical History:  Past Medical History:   Diagnosis Date    Alcoholism (HCC)     Hypertension     Ill-defined condition     ETOH    Marijuana smoker        Past Surgical History:  No past surgical history on file.    Family History:  Family History   Problem Relation Age of Onset    Diabetes type 2  Mother        Social History:  Social History     Tobacco Use    Smoking status: Former     Passive exposure: Never   Substance Use Topics    Alcohol use: Yes     Comment: one pint a day    Drug use: Yes     Types: Marijuana (Weed)     Comment: daily       Medications:  No current facility-administered medications for this encounter.     Current Outpatient Medications   Medication Sig Dispense Refill    ibuprofen (ADVIL;MOTRIN) 600 MG tablet Take 1 tablet by mouth 3 times daily as needed for Pain 20 tablet 0    tamsulosin (FLOMAX) 0.4 MG capsule Take 1 capsule by mouth daily 30 capsule 5    Multiple Vitamin (MVI, CELEBRATE, CHEWABLE

## 2024-12-12 NOTE — PROGRESS NOTES
completed the initial Spiritual Assessment of the patient, and offered Pastoral Care support to the patient, . Patient is active in a local Hindu.   An advance directive was completed today. Patient does not have any Zoroastrianism/cultural needs that will affect patient’s preferences in health care. Chaplains will continue to follow and will provide pastoral care on an as needed/requested basis.    Advance Care Planning     Advance Care Planning Inpatient Note  Spiritual Care Department    Today's Date: 12/12/2024  Unit: [facility] EMERGENCY DEPT    Received request from .  Upon review of chart and communication with care team, patient's decision making abilities are not in question.. Patient was/were present in the room during visit.    Goals of ACP Conversation:  Discuss advance care planning documents    Health Care Decision Makers:     No healthcare decision makers have been documented.    Summary:  Completed New Documents    Advance Care Planning Documents (Patient Wishes):  Healthcare Power of /Advance Directive Appointment of Health Care Agent  Living Will/Advance Directive  Anatomical Gift/Organ Donation     Assessment:  Patient completed an advance directive today. Document was charted and copied with copies given to patient.    Interventions:  Assisted in the completion of documents according to patient's wishes at this time    Care Preferences Communicated:   No    Outcomes/Plan:  New advance directive completed.    Electronically signed by Delvis Rose Jr., [facility] on 12/12/2024 at 10:46 AM

## 2024-12-12 NOTE — ED TRIAGE NOTES
Pt brought in by medic with complaint of shortness of breath.  Pt states about an hour ago,  his breath got short, his chest tightened and he was lightheaded

## 2025-01-02 ENCOUNTER — HOSPITAL ENCOUNTER (EMERGENCY)
Facility: HOSPITAL | Age: 55
Discharge: HOME | End: 2025-01-02
Attending: EMERGENCY MEDICINE
Payer: COMMERCIAL

## 2025-01-02 ENCOUNTER — APPOINTMENT (OUTPATIENT)
Facility: HOSPITAL | Age: 55
End: 2025-01-02
Payer: COMMERCIAL

## 2025-01-02 VITALS
TEMPERATURE: 98.1 F | DIASTOLIC BLOOD PRESSURE: 93 MMHG | SYSTOLIC BLOOD PRESSURE: 154 MMHG | RESPIRATION RATE: 16 BRPM | WEIGHT: 160 LBS | HEART RATE: 76 BPM | HEIGHT: 70 IN | OXYGEN SATURATION: 100 % | BODY MASS INDEX: 22.9 KG/M2

## 2025-01-02 DIAGNOSIS — E16.2 HYPOGLYCEMIA: Primary | ICD-10-CM

## 2025-01-02 DIAGNOSIS — R91.1 PULMONARY NODULE: ICD-10-CM

## 2025-01-02 LAB
ALBUMIN SERPL-MCNC: 4 G/DL (ref 3.4–5)
ALBUMIN/GLOB SERPL: 1.1 (ref 0.8–1.7)
ALP SERPL-CCNC: 81 U/L (ref 45–117)
ALT SERPL-CCNC: 35 U/L (ref 16–61)
ANION GAP SERPL CALC-SCNC: 8 MMOL/L (ref 3–18)
AST SERPL-CCNC: 45 U/L (ref 10–38)
BASOPHILS # BLD: 0 K/UL (ref 0–0.1)
BASOPHILS NFR BLD: 0 % (ref 0–2)
BILIRUB SERPL-MCNC: 0.6 MG/DL (ref 0.2–1)
BUN SERPL-MCNC: 18 MG/DL (ref 7–18)
BUN/CREAT SERPL: 20 (ref 12–20)
CALCIUM SERPL-MCNC: 9 MG/DL (ref 8.5–10.1)
CHLORIDE SERPL-SCNC: 107 MMOL/L (ref 100–111)
CO2 SERPL-SCNC: 24 MMOL/L (ref 21–32)
CREAT SERPL-MCNC: 0.88 MG/DL (ref 0.6–1.3)
DIFFERENTIAL METHOD BLD: ABNORMAL
EOSINOPHIL # BLD: 0.3 K/UL (ref 0–0.4)
EOSINOPHIL NFR BLD: 5 % (ref 0–5)
ERYTHROCYTE [DISTWIDTH] IN BLOOD BY AUTOMATED COUNT: 13 % (ref 11.6–14.5)
GLOBULIN SER CALC-MCNC: 3.7 G/DL (ref 2–4)
GLUCOSE BLD STRIP.AUTO-MCNC: 106 MG/DL (ref 70–110)
GLUCOSE BLD STRIP.AUTO-MCNC: 93 MG/DL (ref 70–110)
GLUCOSE BLD STRIP.AUTO-MCNC: 93 MG/DL (ref 70–110)
GLUCOSE SERPL-MCNC: 47 MG/DL (ref 74–99)
HCT VFR BLD AUTO: 36 % (ref 36–48)
HGB BLD-MCNC: 11.8 G/DL (ref 13–16)
IMM GRANULOCYTES # BLD AUTO: 0 K/UL (ref 0–0.04)
IMM GRANULOCYTES NFR BLD AUTO: 0 % (ref 0–0.5)
LYMPHOCYTES # BLD: 1.4 K/UL (ref 0.9–3.6)
LYMPHOCYTES NFR BLD: 21 % (ref 21–52)
MAGNESIUM SERPL-MCNC: 1.7 MG/DL (ref 1.6–2.6)
MCH RBC QN AUTO: 32.2 PG (ref 24–34)
MCHC RBC AUTO-ENTMCNC: 32.8 G/DL (ref 31–37)
MCV RBC AUTO: 98.4 FL (ref 78–100)
MONOCYTES # BLD: 0.4 K/UL (ref 0.05–1.2)
MONOCYTES NFR BLD: 6 % (ref 3–10)
NEUTS SEG # BLD: 4.6 K/UL (ref 1.8–8)
NEUTS SEG NFR BLD: 69 % (ref 40–73)
NRBC # BLD: 0 K/UL (ref 0–0.01)
NRBC BLD-RTO: 0 PER 100 WBC
PLATELET # BLD AUTO: 229 K/UL (ref 135–420)
PMV BLD AUTO: 9.8 FL (ref 9.2–11.8)
POTASSIUM SERPL-SCNC: 3.5 MMOL/L (ref 3.5–5.5)
PROT SERPL-MCNC: 7.7 G/DL (ref 6.4–8.2)
RBC # BLD AUTO: 3.66 M/UL (ref 4.35–5.65)
SODIUM SERPL-SCNC: 139 MMOL/L (ref 136–145)
TROPONIN I SERPL HS-MCNC: 10 NG/L (ref 0–78)
TROPONIN I SERPL HS-MCNC: 9 NG/L (ref 0–78)
WBC # BLD AUTO: 6.7 K/UL (ref 4.6–13.2)

## 2025-01-02 PROCEDURE — 83735 ASSAY OF MAGNESIUM: CPT

## 2025-01-02 PROCEDURE — 99284 EMERGENCY DEPT VISIT MOD MDM: CPT

## 2025-01-02 PROCEDURE — 85025 COMPLETE CBC W/AUTO DIFF WBC: CPT

## 2025-01-02 PROCEDURE — 82962 GLUCOSE BLOOD TEST: CPT

## 2025-01-02 PROCEDURE — 71045 X-RAY EXAM CHEST 1 VIEW: CPT

## 2025-01-02 PROCEDURE — 80053 COMPREHEN METABOLIC PANEL: CPT

## 2025-01-02 PROCEDURE — 84484 ASSAY OF TROPONIN QUANT: CPT

## 2025-01-02 PROCEDURE — 94761 N-INVAS EAR/PLS OXIMETRY MLT: CPT

## 2025-01-02 ASSESSMENT — ENCOUNTER SYMPTOMS
EYES NEGATIVE: 1
CHEST TIGHTNESS: 0
ABDOMINAL PAIN: 0
SHORTNESS OF BREATH: 0

## 2025-01-02 ASSESSMENT — PAIN SCALES - GENERAL: PAINLEVEL_OUTOF10: 0

## 2025-01-02 ASSESSMENT — PAIN - FUNCTIONAL ASSESSMENT: PAIN_FUNCTIONAL_ASSESSMENT: 0-10

## 2025-01-02 NOTE — ED NOTES
After visit summary provided. Reviewed and educated patient on information; including, medication, follow-up appointments, and additional care instructions.  All questions and concerns addressed at this time.  Patient verbalized understanding of discharge teaching.  Provider aware of vitals at time of discharge.

## 2025-01-02 NOTE — DISCHARGE INSTRUCTIONS
Your blood sugar was low and it looks like has been low before and that probably is why you did not feel good.  Yet to make sure you are eating regularly and follow-up closely with primary care provider.  I have given you some resources that may help with your homelessness but you have been able to manage quite well while you are working on getting resources for yourself.  I would like you to go to the [facility] for follow-up and they will be able to provide healthcare support for you.  Your chest x-ray showed a pulmonary nodule which is not uncommon but this needs to be monitored by your primary care doctor by doing repeat x-rays or CAT scans.  Please return if you are at all worsened or concerned.    Food Resources  Food Pantries:  [employer] [facility]  [address]  [phone]  Tue/Thur 9am-11am  [facility]  [address]  [phone]  Tue/Sat 10am  [facility]  [address]  [phone]  Thur 10am-330pm  [facility]   [address]  [phone]  Fri 10am-12pm  [facility]   [address]  [phone]  Tu/Thur 5pm-6pm  [facility]  [address]  [phone]  4th Monday every month 930am-11am  [facility]  [address]  [phone]  Tues/Fri/Sat 7am-8am  [place]:  [employer] [facility]   Monday-Thursday 7-745am, 1145am-1245pm, And Saturday 12-1pm    Shelters  [facility]  [phone] (DV) Office   [phone] (DV) Hotline   [phone] Homeless Crisis Hotline  POC: johanny Chase     [phone] ex. 525  omayra@[email or web address]  [facility]  Homeless Hotline 926-846-4062  POC: Rasheed Wu  , housing assessments  Cell: [phone]  [email or web address] housing homeless  [phone]- [phone]  POC: Kamico Tracy  [phone]  [email or web address]  [phone]  [facility]  [place]

## 2025-01-02 NOTE — ED PROVIDER NOTES
EMERGENCY DEPARTMENT HISTORY AND PHYSICAL EXAM    7:36 AM      Date: 1/2/2025  Patient Name: Arnold Keys    History of Presenting Illness     Chief Complaint   Patient presents with    Fatigue       History From: Patient    Arnold Keys is a 54 y.o. male   Patient is a 54-year-old male with a history of encephalopathy, alcoholism, marijuana use, homelessness, the presents emergency department saying he to started feeling weak this morning.  Patient says he has been undomiciled for a year and is gotten quite good at managing being homeless and said he did not feel too cold last night but said he slept on and off throughout the evening and was getting up to go to [employer] for some breakfast then go to [employer] to get some food throughout the day.  Patient said he was getting up and walking and is felt very fatigued and came into the emergency department.  Patient said he just felt like he could not walk anymore and needed to be checked out.  Patient denies any chest pain or shortness of breath and denies any changes to his medications.  Patient admits to smoking cigarettes as well as marijuana and says he drinks alcohol occasionally.  Patient says is not working sister checks on him regularly and he currently has no place to live.  Patient also notes that he is currently not disabled and has no income.         Nursing Notes were all reviewed and agreed with or any disagreements were addressed in the HPI.    PCP: Unknown, Provider, ANP    No current facility-administered medications for this encounter.     Current Outpatient Medications   Medication Sig Dispense Refill    ibuprofen (ADVIL;MOTRIN) 600 MG tablet Take 1 tablet by mouth 3 times daily as needed for Pain 20 tablet 0    tamsulosin (FLOMAX) 0.4 MG capsule Take 1 capsule by mouth daily 30 capsule 5    Multiple Vitamin (MVI, CELEBRATE, CHEWABLE TABLET) Take 1 tablet by mouth daily 30 tablet 0    vitamin B-1 (THIAMINE) 100 MG tablet Take 1 tablet by mouth daily    Cardiovascular:  Negative for chest pain.   Gastrointestinal:  Negative for abdominal pain.   Genitourinary:  Negative for dysuria.   Musculoskeletal:  Negative for arthralgias.   Skin: Negative.    Neurological:  Positive for light-headedness. Negative for weakness.   Hematological: Negative.          Physical Exam   BP (!) 151/89   Pulse 62   Temp 98.1 °F (36.7 °C) (Oral)   Resp 16   Ht 1.778 m (5' 10\")   Wt 72.6 kg (160 lb)   SpO2 99%   BMI 22.96 kg/m²     ***  Physical Exam  Vitals and nursing note reviewed.   Constitutional:       General: He is not in acute distress.     Appearance: Normal appearance.   HENT:      Head: Normocephalic and atraumatic.      Right Ear: External ear normal.      Left Ear: External ear normal.      Mouth/Throat:      Mouth: Mucous membranes are moist.   Eyes:      Pupils: Pupils are equal, round, and reactive to light.   Cardiovascular:      Rate and Rhythm: Normal rate.   Pulmonary:      Effort: Pulmonary effort is normal.   Abdominal:      General: There is no distension.      Palpations: Abdomen is soft.   Musculoskeletal:         General: Normal range of motion.   Skin:     General: Skin is warm and dry.      Capillary Refill: Capillary refill takes less than 2 seconds.   Neurological:      General: No focal deficit present.      Mental Status: He is alert and oriented to person, place, and time.      Comments: Symmetric strength, moving all 4 extremities, no arm or leg drift, gait not observed, NIH stroke scale 0, visual recognition normal   Psychiatric:         Mood and Affect: Mood normal.         Behavior: Behavior normal.           Diagnostic Study Results     Labs -  Recent Results (from the past 12 hour(s))   POCT Glucose    Collection Time: 01/02/25  6:30 AM   Result Value Ref Range    POC Glucose 93 70 - 110 mg/dL       Radiologic Studies -   XR CHEST 1 VIEW    (Results Pending)         Medical Decision Making   I am the first provider for this patient.    I

## 2025-01-02 NOTE — ED TRIAGE NOTES
EMS reports 911 was called d/t generalized weakness that began today. Pt states he has been out in the cold all night and felt weak. He states he also felt like his BG was low. Glucometer was malfunctioning en route to the ED. BP was 150/90. Pt walked to and from the ambulance.

## 2025-01-02 NOTE — ED NOTES
Bedside shift report performed with CHLOÉ Denny.    Pt provided 2 OJ, line and labs done at this time.

## 2025-01-08 NOTE — ED TRIAGE NOTES
"Mimi is a 49 year old who is being evaluated via a billable video visit.    How would you like to obtain your AVS? MyChart  If the video visit is dropped, the invitation should be resent by: Send to e-mail at: [email or web address]  Will anyone else be joining your video visit? No      Assessment & Plan     Essential hypertension with goal blood pressure less than 140/90  - Home BP improved, continue current medication. Scheduled with nephrologist spring 2025.   - hydroCHLOROthiazide 12.5 MG tablet; Take 1 tablet (12.5 mg) by mouth 2 times daily.  - Basic metabolic panel  (Ca, Cl, CO2, Creat, Gluc, K, Na, BUN); Future  - PRIMARY CARE FOLLOW-UP SCHEDULING; Future  - losartan (COZAAR) 25 MG tablet; Take 1 tablet (25 mg) by mouth 2 times daily.    Prediabetes  - given diabetic educator scheduling number  - recheck labs in 6 months            BMI  Estimated body mass index is 33.69 kg/m  as calculated from the following:    Height as of 11/25/24: 1.626 m (5' 4\").    Weight as of 12/30/24: 89 kg (196 lb 4.8 oz).             Subjective   Mimi is a 49 year old, presenting for the following health issues:  Hypertension (F/U)        1/8/2025     4:53 PM   Additional Questions   Roomed by Anay DUMONT MA   Accompanied by Self     History of Present Illness       Reason for visit:  Follow up for blood pressure & weight mgmt   She is taking medications regularly.     Home -130/70-90. Diastolic mainly in the 80's.                   Objective    Vitals - Patient Reported  Weight (Patient Reported): 88.9 kg (196 lb)  Height (Patient Reported): 162.6 cm (5' 4\")  BMI (Based on Pt Reported Ht/Wt): 33.64  Pain Score: No Pain (0)      Vitals:  No vitals were obtained today due to virtual visit.    Physical Exam   GENERAL: alert and no distress  EYES: Eyes grossly normal to inspection.  No discharge or erythema, or obvious scleral/conjunctival abnormalities.  RESP: No audible wheeze, cough, or visible cyanosis.    SKIN: " Pt here for staple removal from back of head   Visible skin clear. No significant rash, abnormal pigmentation or lesions.  NEURO: Cranial nerves grossly intact.  Mentation and speech appropriate for age.  PSYCH: Appropriate affect, tone, and pace of words          Video-Visit Details    Type of service:  Video Visit   Originating Location (pt. Location): Home    Distant Location (provider location):  On-site  Platform used for Video Visit: Salma  Signed Electronically by: Nanette Penny MD

## 2025-01-16 ENCOUNTER — HOSPITAL ENCOUNTER (EMERGENCY)
Facility: HOSPITAL | Age: 55
Discharge: HOME | End: 2025-01-16
Attending: EMERGENCY MEDICINE
Payer: COMMERCIAL

## 2025-01-16 VITALS
WEIGHT: 160 LBS | DIASTOLIC BLOOD PRESSURE: 85 MMHG | BODY MASS INDEX: 22.9 KG/M2 | HEART RATE: 60 BPM | RESPIRATION RATE: 18 BRPM | OXYGEN SATURATION: 100 % | SYSTOLIC BLOOD PRESSURE: 129 MMHG | HEIGHT: 70 IN | TEMPERATURE: 98.3 F

## 2025-01-16 DIAGNOSIS — K08.89 PAIN, DENTAL: Primary | ICD-10-CM

## 2025-01-16 PROCEDURE — 99283 EMERGENCY DEPT VISIT LOW MDM: CPT

## 2025-01-16 ASSESSMENT — PAIN SCALES - GENERAL: PAINLEVEL_OUTOF10: 10

## 2025-01-16 ASSESSMENT — PAIN DESCRIPTION - LOCATION: LOCATION: MOUTH

## 2025-01-16 ASSESSMENT — PAIN DESCRIPTION - ORIENTATION: ORIENTATION: LEFT;LOWER

## 2025-01-16 ASSESSMENT — PAIN DESCRIPTION - DESCRIPTORS: DESCRIPTORS: ACHING

## 2025-01-17 NOTE — ED TRIAGE NOTES
Pt arrived via Triage ambulatory c/o left lower mouth pain after having his tooth pulled earlier today. Pt states he wasn't given any pain medication to take.

## 2025-01-17 NOTE — DISCHARGE INSTRUCTIONS
Come back if you get worse.  Follow-up without fail.  Come back in the morning for help with getting prescriptions filled.  Please put gauze in your mouth frequently until bleeding stops.

## 2025-01-17 NOTE — ED NOTES
EMERGENCY DEPARTMENT HISTORY AND PHYSICAL EXAM    10:15 PM      Date: 1/16/2025  Patient Name: Arnold Keys    History of Presenting Illness     Chief Complaint   Patient presents with    Dental Pain       HPI:  Patient is a 54 year old male with past medical of homelessness and hypertension who presents via EMS with dental pain and bleeding following a root canal earlier today. He was unable to get his medications today due to not being able to pay for them. He has a few ibuprofen 600mg tabs which have helped some with the pain. He has not been able to eat due to pain and some continued bleeding. Currently homeless. Has been refraining from alcohol and marijuana use following procedure. Is scheduled for repeat dental procedures in the next few weeks.     PCP: Unknown, Provider, ANP    No current facility-administered medications for this encounter.     Current Outpatient Medications   Medication Sig Dispense Refill    ibuprofen (ADVIL;MOTRIN) 600 MG tablet Take 1 tablet by mouth 3 times daily as needed for Pain 20 tablet 0    tamsulosin (FLOMAX) 0.4 MG capsule Take 1 capsule by mouth daily 30 capsule 5    Multiple Vitamin (MVI, CELEBRATE, CHEWABLE TABLET) Take 1 tablet by mouth daily 30 tablet 0    vitamin B-1 (THIAMINE) 100 MG tablet Take 1 tablet by mouth daily 30 tablet 3    folic acid (FOLVITE) 1 MG tablet Take 1 tablet by mouth daily 30 tablet 5    glucose monitoring kit 1 kit by Does not apply route daily 1 kit 0    Lancets MISC 1 each by Does not apply route daily 100 each 0    blood glucose monitor strips Test 1 time a day & as needed for symptoms of irregular blood glucose. Dispense sufficient amount for indicated testing frequency plus additional to accommodate PRN testing needs. 50 strip 0    naproxen (NAPROSYN) 250 MG tablet Take 1 tablet by mouth 2 times daily (with meals) 180 tablet 1    hydrALAZINE (APRESOLINE) 50 MG tablet Take 1 tablet by mouth every 8 hours (Patient not taking: Reported on  5/21/2023) 90 tablet 0    amLODIPine (NORVASC) 10 MG tablet Take 1 tablet by mouth nightly (Patient not taking: Reported on 5/21/2023) 30 tablet 3    lisinopril (PRINIVIL;ZESTRIL) 10 MG tablet Take 1 tablet by mouth daily (Patient not taking: Reported on 5/21/2023) 90 tablet 1    acetaminophen (TYLENOL) 325 MG tablet Take 650 mg by mouth every 6 hours as needed for Pain or Fever (Patient not taking: Reported on 5/21/2023)         Past History     Past Medical History:  Past Medical History:   Diagnosis Date    Alcoholism (HCC)     Hypertension     Ill-defined condition     ETOH    Marijuana smoker        Past Surgical History:  No past surgical history on file.    Family History:  Family History   Problem Relation Age of Onset    Diabetes type 2  Mother        Social History:  Social History     Tobacco Use    Smoking status: Former     Passive exposure: Never   Substance Use Topics    Alcohol use: Yes     Comment: one pint a day    Drug use: Yes     Types: Marijuana (Weed)     Comment: daily       Allergies:  No Known Allergies      Review of Systems       ROS negative unless otherwise specified in HPI.       Physical Exam   /85   Pulse 60   Temp 98.3 °F (36.8 °C) (Oral)   Resp 18   Ht 1.778 m (5' 10\")   Wt 72.6 kg (160 lb)   SpO2 100%   BMI 22.96 kg/m²       Physical Exam  Constitutional:       General: He is not in acute distress.     Appearance: Normal appearance. He is not toxic-appearing.   HENT:      Nose: Nose normal.      Mouth/Throat:      Mouth: Mucous membranes are moist.      Comments: Root canal of tooth 17 or 18? - light bleeding on gauze  Cardiovascular:      Rate and Rhythm: Normal rate and regular rhythm.   Pulmonary:      Effort: Pulmonary effort is normal.      Breath sounds: Normal breath sounds.   Abdominal:      General: Abdomen is flat. Bowel sounds are normal.      Palpations: Abdomen is soft.   Skin:     General: Skin is warm and dry.   Neurological:      Mental Status: He is

## 2025-01-27 ENCOUNTER — HOSPITAL ENCOUNTER (EMERGENCY)
Facility: HOSPITAL | Age: 55
Discharge: HOME | End: 2025-01-27
Payer: COMMERCIAL

## 2025-01-27 VITALS
HEART RATE: 90 BPM | HEIGHT: 70 IN | DIASTOLIC BLOOD PRESSURE: 108 MMHG | SYSTOLIC BLOOD PRESSURE: 169 MMHG | WEIGHT: 158.9 LBS | RESPIRATION RATE: 17 BRPM | BODY MASS INDEX: 22.75 KG/M2 | TEMPERATURE: 99.3 F | OXYGEN SATURATION: 99 %

## 2025-01-27 DIAGNOSIS — J10.1 INFLUENZA A: Primary | ICD-10-CM

## 2025-01-27 LAB
B PERT DNA SPEC QL NAA+PROBE: NOT DETECTED
BORDETELLA PARAPERTUSSIS BY PCR: NOT DETECTED
C PNEUM DNA SPEC QL NAA+PROBE: NOT DETECTED
FLUAV H3 RNA SPEC QL NAA+PROBE: DETECTED
FLUBV RNA SPEC QL NAA+PROBE: NOT DETECTED
HADV DNA SPEC QL NAA+PROBE: NOT DETECTED
HCOV 229E RNA SPEC QL NAA+PROBE: NOT DETECTED
HCOV HKU1 RNA SPEC QL NAA+PROBE: NOT DETECTED
HCOV NL63 RNA SPEC QL NAA+PROBE: NOT DETECTED
HCOV OC43 RNA SPEC QL NAA+PROBE: NOT DETECTED
HMPV RNA SPEC QL NAA+PROBE: NOT DETECTED
HPIV1 RNA SPEC QL NAA+PROBE: NOT DETECTED
HPIV2 RNA SPEC QL NAA+PROBE: NOT DETECTED
HPIV3 RNA SPEC QL NAA+PROBE: NOT DETECTED
HPIV4 RNA SPEC QL NAA+PROBE: NOT DETECTED
M PNEUMO DNA SPEC QL NAA+PROBE: NOT DETECTED
RSV RNA SPEC QL NAA+PROBE: NOT DETECTED
RV+EV RNA SPEC QL NAA+PROBE: NOT DETECTED
SARS-COV-2 RNA RESP QL NAA+PROBE: NOT DETECTED

## 2025-01-27 PROCEDURE — 0202U NFCT DS 22 TRGT SARS-COV-2: CPT

## 2025-01-27 PROCEDURE — 6370000000 HC RX 637 (ALT 250 FOR IP): Performed by: PHYSICIAN ASSISTANT

## 2025-01-27 PROCEDURE — 99283 EMERGENCY DEPT VISIT LOW MDM: CPT

## 2025-01-27 RX ORDER — IBUPROFEN 600 MG/1
600 TABLET, FILM COATED ORAL
Status: COMPLETED | OUTPATIENT
Start: 2025-01-27 | End: 2025-01-27

## 2025-01-27 RX ORDER — IBUPROFEN 600 MG/1
600 TABLET, FILM COATED ORAL EVERY 6 HOURS PRN
Qty: 30 TABLET | Refills: 0 | Status: SHIPPED | OUTPATIENT
Start: 2025-01-27

## 2025-01-27 RX ORDER — BENZONATATE 100 MG/1
100 CAPSULE ORAL
Status: COMPLETED | OUTPATIENT
Start: 2025-01-27 | End: 2025-01-27

## 2025-01-27 RX ORDER — BENZONATATE 100 MG/1
100 CAPSULE ORAL 2 TIMES DAILY PRN
Qty: 20 CAPSULE | Refills: 0 | Status: SHIPPED | OUTPATIENT
Start: 2025-01-27 | End: 2025-02-03

## 2025-01-27 RX ORDER — OSELTAMIVIR PHOSPHATE 75 MG/1
75 CAPSULE ORAL
Status: COMPLETED | OUTPATIENT
Start: 2025-01-27 | End: 2025-01-27

## 2025-01-27 RX ORDER — OSELTAMIVIR PHOSPHATE 75 MG/1
75 CAPSULE ORAL 2 TIMES DAILY
Qty: 10 CAPSULE | Refills: 0 | Status: SHIPPED | OUTPATIENT
Start: 2025-01-27 | End: 2025-02-01

## 2025-01-27 RX ADMIN — IBUPROFEN 600 MG: 600 TABLET, FILM COATED ORAL at 05:45

## 2025-01-27 RX ADMIN — BENZONATATE 100 MG: 100 CAPSULE ORAL at 05:45

## 2025-01-27 RX ADMIN — OSELTAMIVIR PHOSPHATE 75 MG: 75 CAPSULE ORAL at 05:45

## 2025-01-27 ASSESSMENT — ENCOUNTER SYMPTOMS
EYE DISCHARGE: 0
ABDOMINAL PAIN: 0
SORE THROAT: 0
RHINORRHEA: 0
WHEEZING: 0
EYE REDNESS: 0
NAUSEA: 0
SHORTNESS OF BREATH: 0
COUGH: 1
BACK PAIN: 0
DIARRHEA: 0
VOMITING: 0
CONSTIPATION: 0
STRIDOR: 0

## 2025-01-27 ASSESSMENT — PAIN - FUNCTIONAL ASSESSMENT: PAIN_FUNCTIONAL_ASSESSMENT: NONE - DENIES PAIN

## 2025-01-27 NOTE — ED TRIAGE NOTES
Patient presents to the ED via EMS for evaluation of flu like symptoms that started this morning. Congestion and all over body aches. Patient also states he is homeless.     No SOB or CP

## 2025-01-27 NOTE — ED PROVIDER NOTES
EMERGENCY DEPARTMENT HISTORY AND PHYSICAL EXAM    Date: 1/27/2025  Patient Name: Arnold Keys    History of Presenting Illness     Chief Complaint   Patient presents with    Generalized Body Aches         History Provided By: patient     Chief Complaint: flu like sx  Duration: 1 day  Timing:  acute  Location: upper resp   Quality: aching   Severity: moderate  Modifying Factors: none   Associated Symptoms: cough congestion body aches chills       Additional History (Context): Arnold Keys is a 54 y.o. male with PMH htn and alcohol abuse who presents with c/o 1 day of flulike symptoms to include cough congestion body aches and chills.  No known sick exposures.  Denies taking any medications for symptoms prior to arrival.  No other complaints reported    PCP: Unknown, Provider, ANP    Current Facility-Administered Medications   Medication Dose Route Frequency Provider Last Rate Last Admin    oseltamivir (TAMIFLU) capsule 75 mg  75 mg Oral NOW Harjeet April LAKIA DAMICO        ibuprofen (ADVIL;MOTRIN) tablet 600 mg  600 mg Oral NOW Harjeet April LAKIA DAMICO        benzonatate (TESSALON) capsule 100 mg  100 mg [facility] NOW [facility] MOOK[facility], LAKIA         Current Outpatient Medications   Medication Sig Dispense Refill    oseltamivir (TAMIFLU) 75 MG capsule Take 1 capsule by mouth 2 times daily for 5 days 10 capsule 0    ibuprofen (IBU) 600 MG tablet Take 1 tablet by mouth every 6 hours as needed for Pain 30 tablet 0    benzonatate (TESSALON) 100 MG capsule Take 1 capsule by mouth 2 times daily as needed for Cough 20 capsule 0    tamsulosin (FLOMAX) 0.4 MG capsule Take 1 capsule by mouth daily 30 capsule 5    Multiple Vitamin (MVI, CELEBRATE, CHEWABLE TABLET) Take 1 tablet by mouth daily 30 tablet 0    vitamin B-1 (THIAMINE) 100 MG tablet Take 1 tablet by mouth daily 30 tablet 3    folic acid (FOLVITE) 1 MG tablet Take 1 tablet by mouth daily 30 tablet 5    glucose monitoring kit 1 kit by Does not apply route daily 1 kit 0

## 2025-03-28 ENCOUNTER — HOSPITAL ENCOUNTER (EMERGENCY)
Facility: HOSPITAL | Age: 55
Discharge: LEFT BEFORE DISCHARGE | End: 2025-03-28
Payer: COMMERCIAL

## 2025-03-28 VITALS
WEIGHT: 160 LBS | HEIGHT: 70 IN | OXYGEN SATURATION: 100 % | RESPIRATION RATE: 16 BRPM | SYSTOLIC BLOOD PRESSURE: 117 MMHG | BODY MASS INDEX: 22.9 KG/M2 | DIASTOLIC BLOOD PRESSURE: 80 MMHG | TEMPERATURE: 97.9 F | HEART RATE: 83 BPM

## 2025-03-28 DIAGNOSIS — J02.9 PHARYNGITIS, UNSPECIFIED ETIOLOGY: Primary | ICD-10-CM

## 2025-03-28 DIAGNOSIS — U07.1 COVID-19: ICD-10-CM

## 2025-03-28 LAB
FLUAV RNA SPEC QL NAA+PROBE: NOT DETECTED
FLUBV RNA SPEC QL NAA+PROBE: NOT DETECTED
S PYO DNA THROAT QL NAA+PROBE: NOT DETECTED
SARS-COV-2 RNA RESP QL NAA+PROBE: DETECTED
SOURCE: ABNORMAL

## 2025-03-28 PROCEDURE — 99283 EMERGENCY DEPT VISIT LOW MDM: CPT

## 2025-03-28 PROCEDURE — 87651 STREP A DNA AMP PROBE: CPT

## 2025-03-28 PROCEDURE — 87636 SARSCOV2 & INF A&B AMP PRB: CPT

## 2025-03-28 PROCEDURE — 94760 N-INVAS EAR/PLS OXIMETRY 1: CPT

## 2025-03-28 ASSESSMENT — PAIN DESCRIPTION - LOCATION: LOCATION: THROAT

## 2025-03-28 ASSESSMENT — PAIN - FUNCTIONAL ASSESSMENT: PAIN_FUNCTIONAL_ASSESSMENT: 0-10

## 2025-03-28 ASSESSMENT — PAIN DESCRIPTION - DESCRIPTORS: DESCRIPTORS: SORE

## 2025-03-28 ASSESSMENT — PAIN SCALES - GENERAL: PAINLEVEL_OUTOF10: 7

## 2025-03-28 NOTE — ED PROVIDER NOTES
EMERGENCY DEPARTMENT HISTORY AND PHYSICAL EXAM      Patient Name: Arnold Keys  MRN: [id number]  YOB: 1970  Provider: Emilie Reyes PA-C  PCP: Unknown, Provider, ANP   Time/Date of evaluation: 12:16 PM EDT on 3/28/25    History of Presenting Illness     Chief Complaint   Patient presents with    Pharyngitis       History Provided By: Patient     History (Narative):   Arnold Keys is a 54 y.o. male with a PMHX of alcoholism, hypertension,  who presents to the emergency department  by POV C/O sore throat.  Patient endorses that he slept outside last night and he is having generalized body aches, and a sore throat.  He denies any fever, any nausea, vomiting cough, congestion or shortness of breath or chest pain.  Past History     Past Medical History:  Past Medical History:   Diagnosis Date    Alcoholism (HCC)     Hypertension     Ill-defined condition     ETOH    Marijuana smoker        Past Surgical History:  No past surgical history on file.    Family History:  Family History   Problem Relation Age of Onset    Diabetes type 2  Mother        Social History:  Social History     Tobacco Use    Smoking status: Former     Passive exposure: Never   Substance Use Topics    Alcohol use: Yes     Comment: one pint a day    Drug use: Yes     Types: Marijuana (Weed)     Comment: daily       Medications:  No current facility-administered medications for this encounter.     Current Outpatient Medications   Medication Sig Dispense Refill    ibuprofen (IBU) 600 MG tablet Take 1 tablet by mouth every 6 hours as needed for Pain 30 tablet 0    tamsulosin (FLOMAX) 0.4 MG capsule Take 1 capsule by mouth daily 30 capsule 5    Multiple Vitamin (MVI, CELEBRATE, CHEWABLE TABLET) Take 1 tablet by mouth daily 30 tablet 0    vitamin B-1 (THIAMINE) 100 MG tablet Take 1 tablet by mouth daily 30 tablet 3    folic acid (FOLVITE) 1 MG tablet Take 1 tablet by mouth daily 30 tablet 5    glucose monitoring kit 1 kit by Does

## 2025-03-28 NOTE — ED TRIAGE NOTES
Pt ambulatory to triage reporting flu like symptoms, reports sore throat and body aches   \"I'd like to see what's wrong with me\"    Pt stated it started last night, reports he slept outside last night (recently left the home he was living in as he decided to go back to the streets)

## 2025-05-04 ENCOUNTER — APPOINTMENT (OUTPATIENT)
Facility: HOSPITAL | Age: 55
DRG: 424 | End: 2025-05-04
Payer: COMMERCIAL

## 2025-05-04 ENCOUNTER — HOSPITAL ENCOUNTER (INPATIENT)
Facility: HOSPITAL | Age: 55
LOS: 2 days | Discharge: HOME HEALTH | DRG: 424 | End: 2025-05-06
Attending: STUDENT IN AN ORGANIZED HEALTH CARE EDUCATION/TRAINING PROGRAM | Admitting: EMERGENCY MEDICINE
Payer: COMMERCIAL

## 2025-05-04 ENCOUNTER — APPOINTMENT (OUTPATIENT)
Facility: HOSPITAL | Age: 55
DRG: 424 | End: 2025-05-04
Attending: STUDENT IN AN ORGANIZED HEALTH CARE EDUCATION/TRAINING PROGRAM
Payer: COMMERCIAL

## 2025-05-04 DIAGNOSIS — T68.XXXA HYPOTHERMIA, INITIAL ENCOUNTER: Primary | ICD-10-CM

## 2025-05-04 DIAGNOSIS — E16.2 HYPOGLYCEMIA: ICD-10-CM

## 2025-05-04 DIAGNOSIS — R79.89 ABNORMAL LFTS: ICD-10-CM

## 2025-05-04 LAB
ALBUMIN SERPL-MCNC: 3.4 G/DL (ref 3.4–5)
ALBUMIN SERPL-MCNC: 4.1 G/DL (ref 3.4–5)
ALBUMIN/GLOB SERPL: 1.1 (ref 0.8–1.7)
ALBUMIN/GLOB SERPL: 1.2 (ref 0.8–1.7)
ALP SERPL-CCNC: 113 U/L (ref 45–117)
ALP SERPL-CCNC: 132 U/L (ref 45–117)
ALT SERPL-CCNC: 358 U/L (ref 10–50)
ALT SERPL-CCNC: 578 U/L (ref 10–50)
AMPHET UR QL SCN: NEGATIVE
ANION GAP BLD CALC-SCNC: ABNORMAL MMOL/L (ref 10–20)
ANION GAP SERPL CALC-SCNC: 15 MMOL/L (ref 3–18)
ANION GAP SERPL CALC-SCNC: 21 MMOL/L (ref 3–18)
ANION GAP SERPL CALC-SCNC: 24 MMOL/L (ref 3–18)
APAP SERPL-MCNC: <5 UG/ML (ref 10–30)
APPEARANCE UR: CLEAR
APTT PPP: 30 SEC (ref 23–36.4)
AST SERPL-CCNC: 2101 U/L (ref 10–38)
AST SERPL-CCNC: 3030 U/L (ref 10–38)
B-OH-BUTYR SERPL-SCNC: 2.03 MMOL/L
BACTERIA URNS QL MICRO: NEGATIVE /HPF
BARBITURATES UR QL SCN: NEGATIVE
BASE DEFICIT BLD-SCNC: 11.4 MMOL/L
BASE DEFICIT BLD-SCNC: 8 MMOL/L
BASOPHILS # BLD: 0.03 K/UL (ref 0–0.1)
BASOPHILS NFR BLD: 0.4 % (ref 0–2)
BENZODIAZ UR QL: NEGATIVE
BILIRUB DIRECT SERPL-MCNC: 0.2 MG/DL (ref 0–0.2)
BILIRUB DIRECT SERPL-MCNC: 0.4 MG/DL (ref 0–0.2)
BILIRUB SERPL-MCNC: 0.5 MG/DL (ref 0.2–1)
BILIRUB SERPL-MCNC: 0.6 MG/DL (ref 0.2–1)
BILIRUB UR QL: NEGATIVE
BUN SERPL-MCNC: 16 MG/DL (ref 6–23)
BUN SERPL-MCNC: 18 MG/DL (ref 6–23)
BUN SERPL-MCNC: 18 MG/DL (ref 6–23)
BUN/CREAT SERPL: 17 (ref 12–20)
BUN/CREAT SERPL: 18 (ref 12–20)
BUN/CREAT SERPL: 19 (ref 12–20)
CA-I BLD-MCNC: 1.03 MMOL/L (ref 1.15–1.33)
CALCIUM SERPL-MCNC: 8.2 MG/DL (ref 8.5–10.1)
CALCIUM SERPL-MCNC: 9.2 MG/DL (ref 8.5–10.1)
CALCIUM SERPL-MCNC: 9.6 MG/DL (ref 8.5–10.1)
CANNABINOIDS UR QL SCN: POSITIVE
CHLORIDE BLD-SCNC: 112 MMOL/L (ref 98–107)
CHLORIDE SERPL-SCNC: 103 MMOL/L (ref 98–107)
CHLORIDE SERPL-SCNC: 99 MMOL/L (ref 98–107)
CHLORIDE SERPL-SCNC: 99 MMOL/L (ref 98–107)
CHP ED QC CHECK: YES
CO2 BLD-SCNC: 15 MMOL/L (ref 22–29)
CO2 SERPL-SCNC: 14 MMOL/L (ref 21–32)
CO2 SERPL-SCNC: 16 MMOL/L (ref 21–32)
CO2 SERPL-SCNC: 22 MMOL/L (ref 21–32)
COCAINE UR QL SCN: NEGATIVE
COLOR UR: YELLOW
CREAT BLD-MCNC: 1.01 MG/DL (ref 0.6–1.3)
CREAT SERPL-MCNC: 0.85 MG/DL (ref 0.6–1.3)
CREAT SERPL-MCNC: 0.98 MG/DL (ref 0.6–1.3)
CREAT SERPL-MCNC: 1.08 MG/DL (ref 0.6–1.3)
DIFFERENTIAL METHOD BLD: ABNORMAL
EOSINOPHIL # BLD: 0.06 K/UL (ref 0–0.4)
EOSINOPHIL NFR BLD: 0.8 % (ref 0–5)
EPITH CASTS URNS QL MICRO: NORMAL /LPF (ref 0–5)
ERYTHROCYTE [DISTWIDTH] IN BLOOD BY AUTOMATED COUNT: 13.7 % (ref 11.6–14.5)
ETHANOL SERPL-MCNC: 86 MG/DL (ref 0–0.08)
FENTANYL: NEGATIVE
GLOBULIN SER CALC-MCNC: 3.1 G/DL (ref 2–4)
GLOBULIN SER CALC-MCNC: 3.4 G/DL (ref 2–4)
GLUCOSE BLD STRIP.AUTO-MCNC: 107 MG/DL (ref 70–110)
GLUCOSE BLD STRIP.AUTO-MCNC: 123 MG/DL (ref 70–110)
GLUCOSE BLD STRIP.AUTO-MCNC: 137 MG/DL (ref 70–110)
GLUCOSE BLD STRIP.AUTO-MCNC: 34 MG/DL (ref 70–110)
GLUCOSE BLD STRIP.AUTO-MCNC: 38 MG/DL (ref 70–110)
GLUCOSE BLD STRIP.AUTO-MCNC: 41 MG/DL (ref 70–110)
GLUCOSE BLD STRIP.AUTO-MCNC: 56 MG/DL (ref 70–110)
GLUCOSE BLD STRIP.AUTO-MCNC: 81 MG/DL (ref 70–110)
GLUCOSE BLD STRIP.AUTO-MCNC: 85 MG/DL (ref 70–110)
GLUCOSE BLD-MCNC: 107 MG/DL
GLUCOSE BLD-MCNC: 33 MG/DL (ref 74–99)
GLUCOSE SERPL-MCNC: 124 MG/DL (ref 74–108)
GLUCOSE SERPL-MCNC: 67 MG/DL (ref 74–108)
GLUCOSE SERPL-MCNC: 87 MG/DL (ref 74–108)
GLUCOSE UR STRIP.AUTO-MCNC: NEGATIVE MG/DL
HCO3 BLD-SCNC: 15.5 MMOL/L (ref 21–28)
HCO3 BLD-SCNC: 18.3 MMOL/L (ref 21–28)
HCT VFR BLD AUTO: 36 % (ref 36–48)
HGB BLD-MCNC: 11.4 G/DL (ref 13–16)
HGB UR QL STRIP: ABNORMAL
IMM GRANULOCYTES # BLD AUTO: 0.06 K/UL (ref 0–0.04)
IMM GRANULOCYTES NFR BLD AUTO: 0.8 % (ref 0–0.5)
INR PPP: 1.1 (ref 0.9–1.1)
KETONES UR QL STRIP.AUTO: 40 MG/DL
LACTATE BLD-SCNC: 6.42 MMOL/L (ref 0.4–2)
LACTATE BLD-SCNC: 6.48 MMOL/L (ref 0.4–2)
LACTATE BLD-SCNC: 8.95 MMOL/L (ref 0.4–2)
LACTATE BLD-SCNC: 9.08 MMOL/L (ref 0.4–2)
LACTATE SERPL-SCNC: 1.7 MMOL/L (ref 0.4–2)
LEUKOCYTE ESTERASE UR QL STRIP.AUTO: NEGATIVE
LIPASE SERPL-CCNC: 31 U/L (ref 13–75)
LYMPHOCYTES # BLD: 1.86 K/UL (ref 0.9–3.6)
LYMPHOCYTES NFR BLD: 23.6 % (ref 21–52)
Lab: ABNORMAL
MAGNESIUM SERPL-MCNC: 1.7 MG/DL (ref 1.6–2.6)
MCH RBC QN AUTO: 33 PG (ref 24–34)
MCHC RBC AUTO-ENTMCNC: 31.7 G/DL (ref 31–37)
MCV RBC AUTO: 104.3 FL (ref 78–100)
METHADONE UR QL: NEGATIVE
MONOCYTES # BLD: 0.27 K/UL (ref 0.05–1.2)
MONOCYTES NFR BLD: 3.4 % (ref 3–10)
NEUTS SEG # BLD: 5.6 K/UL (ref 1.8–8)
NEUTS SEG NFR BLD: 71 % (ref 40–73)
NITRITE UR QL STRIP.AUTO: NEGATIVE
NRBC # BLD: 0 K/UL (ref 0–0.01)
NRBC BLD-RTO: 0 PER 100 WBC
OPIATES UR QL: NEGATIVE
OSMOLALITY SERPL: 301 MOSM/KG H2O (ref 275–295)
OXYCODONE UR QL SCN: NEGATIVE
PCO2 BLD: 39.5 MMHG (ref 35–48)
PCO2 BLDV: 37.7 MMHG (ref 41–51)
PH BLD: 7.27 (ref 7.35–7.45)
PH BLDV: 7.22 (ref 7.32–7.42)
PH UR STRIP: 5.5 (ref 5–8)
PHOSPHATE SERPL-MCNC: 4.3 MG/DL (ref 2.5–4.9)
PLATELET # BLD AUTO: 212 K/UL (ref 135–420)
PMV BLD AUTO: 9.8 FL (ref 9.2–11.8)
PO2 BLD: 46 MMHG (ref 83–108)
PO2 BLDV: 65 MMHG (ref 25–40)
POTASSIUM BLD-SCNC: 3 MMOL/L (ref 3.5–5.1)
POTASSIUM SERPL-SCNC: 3.6 MMOL/L (ref 3.5–5.5)
POTASSIUM SERPL-SCNC: 3.9 MMOL/L (ref 3.5–5.5)
POTASSIUM SERPL-SCNC: 4.2 MMOL/L (ref 3.5–5.5)
PROCALCITONIN SERPL-MCNC: 0.04 NG/ML
PROT SERPL-MCNC: 6.5 G/DL (ref 6.4–8.2)
PROT SERPL-MCNC: 7.5 G/DL (ref 6.4–8.2)
PROT UR STRIP-MCNC: 30 MG/DL
PROTHROMBIN TIME: 14.2 SEC (ref 11.9–14.9)
RBC # BLD AUTO: 3.45 M/UL (ref 4.35–5.65)
RBC #/AREA URNS HPF: NORMAL /HPF (ref 0–5)
SALICYLATES SERPL-MCNC: <0.5 MG/DL (ref 3–10)
SAO2 % BLD: 75.7 % (ref 92–97)
SAO2 % BLD: 88 % (ref 94–98)
SERVICE CMNT-IMP: 555
SERVICE CMNT-IMP: ABNORMAL
SERVICE CMNT-IMP: ABNORMAL
SODIUM BLD-SCNC: 146 MMOL/L (ref 136–145)
SODIUM SERPL-SCNC: 136 MMOL/L (ref 136–145)
SODIUM SERPL-SCNC: 138 MMOL/L (ref 136–145)
SODIUM SERPL-SCNC: 140 MMOL/L (ref 136–145)
SP GR UR REFRACTOMETRY: 1.01 (ref 1–1.03)
SPECIMEN SITE: ABNORMAL
SPECIMEN TYPE: ABNORMAL
TROPONIN T SERPL HS-MCNC: 13.7 NG/L (ref 0–22)
UROBILINOGEN UR QL STRIP.AUTO: 0.2 EU/DL (ref 0.2–1)
WBC # BLD AUTO: 7.9 K/UL (ref 4.6–13.2)
WBC URNS QL MICRO: NORMAL /HPF (ref 0–5)

## 2025-05-04 PROCEDURE — 36556 INSERT NON-TUNNEL CV CATH: CPT

## 2025-05-04 PROCEDURE — 6360000002 HC RX W HCPCS

## 2025-05-04 PROCEDURE — 2580000003 HC RX 258: Performed by: EMERGENCY MEDICINE

## 2025-05-04 PROCEDURE — 83690 ASSAY OF LIPASE: CPT

## 2025-05-04 PROCEDURE — 83930 ASSAY OF BLOOD OSMOLALITY: CPT

## 2025-05-04 PROCEDURE — 82947 ASSAY GLUCOSE BLOOD QUANT: CPT

## 2025-05-04 PROCEDURE — 6370000000 HC RX 637 (ALT 250 FOR IP): Performed by: EMERGENCY MEDICINE

## 2025-05-04 PROCEDURE — 83735 ASSAY OF MAGNESIUM: CPT

## 2025-05-04 PROCEDURE — 84295 ASSAY OF SERUM SODIUM: CPT

## 2025-05-04 PROCEDURE — 85014 HEMATOCRIT: CPT

## 2025-05-04 PROCEDURE — 93976 VASCULAR STUDY: CPT

## 2025-05-04 PROCEDURE — 96360 HYDRATION IV INFUSION INIT: CPT

## 2025-05-04 PROCEDURE — 84145 PROCALCITONIN (PCT): CPT

## 2025-05-04 PROCEDURE — 80048 BASIC METABOLIC PNL TOTAL CA: CPT

## 2025-05-04 PROCEDURE — 80179 DRUG ASSAY SALICYLATE: CPT

## 2025-05-04 PROCEDURE — 80074 ACUTE HEPATITIS PANEL: CPT

## 2025-05-04 PROCEDURE — 06HY33Z INSERTION OF INFUSION DEVICE INTO LOWER VEIN, PERCUTANEOUS APPROACH: ICD-10-PCS | Performed by: STUDENT IN AN ORGANIZED HEALTH CARE EDUCATION/TRAINING PROGRAM

## 2025-05-04 PROCEDURE — 76705 ECHO EXAM OF ABDOMEN: CPT

## 2025-05-04 PROCEDURE — 93005 ELECTROCARDIOGRAM TRACING: CPT | Performed by: STUDENT IN AN ORGANIZED HEALTH CARE EDUCATION/TRAINING PROGRAM

## 2025-05-04 PROCEDURE — 2500000003 HC RX 250 WO HCPCS: Performed by: EMERGENCY MEDICINE

## 2025-05-04 PROCEDURE — 82077 ASSAY SPEC XCP UR&BREATH IA: CPT

## 2025-05-04 PROCEDURE — 80143 DRUG ASSAY ACETAMINOPHEN: CPT

## 2025-05-04 PROCEDURE — 2580000003 HC RX 258: Performed by: STUDENT IN AN ORGANIZED HEALTH CARE EDUCATION/TRAINING PROGRAM

## 2025-05-04 PROCEDURE — 6360000004 HC RX CONTRAST MEDICATION: Performed by: STUDENT IN AN ORGANIZED HEALTH CARE EDUCATION/TRAINING PROGRAM

## 2025-05-04 PROCEDURE — 84484 ASSAY OF TROPONIN QUANT: CPT

## 2025-05-04 PROCEDURE — 80307 DRUG TEST PRSMV CHEM ANLYZR: CPT

## 2025-05-04 PROCEDURE — 82330 ASSAY OF CALCIUM: CPT

## 2025-05-04 PROCEDURE — 82803 BLOOD GASES ANY COMBINATION: CPT

## 2025-05-04 PROCEDURE — 1100000003 HC PRIVATE W/ TELEMETRY

## 2025-05-04 PROCEDURE — 71045 X-RAY EXAM CHEST 1 VIEW: CPT

## 2025-05-04 PROCEDURE — 87040 BLOOD CULTURE FOR BACTERIA: CPT

## 2025-05-04 PROCEDURE — 85025 COMPLETE CBC W/AUTO DIFF WBC: CPT

## 2025-05-04 PROCEDURE — 84132 ASSAY OF SERUM POTASSIUM: CPT

## 2025-05-04 PROCEDURE — 51702 INSERT TEMP BLADDER CATH: CPT

## 2025-05-04 PROCEDURE — 81001 URINALYSIS AUTO W/SCOPE: CPT

## 2025-05-04 PROCEDURE — 84100 ASSAY OF PHOSPHORUS: CPT

## 2025-05-04 PROCEDURE — 82962 GLUCOSE BLOOD TEST: CPT

## 2025-05-04 PROCEDURE — 80076 HEPATIC FUNCTION PANEL: CPT

## 2025-05-04 PROCEDURE — 99223 1ST HOSP IP/OBS HIGH 75: CPT | Performed by: EMERGENCY MEDICINE

## 2025-05-04 PROCEDURE — 99285 EMERGENCY DEPT VISIT HI MDM: CPT

## 2025-05-04 PROCEDURE — 71260 CT THORAX DX C+: CPT

## 2025-05-04 PROCEDURE — 6360000002 HC RX W HCPCS: Performed by: EMERGENCY MEDICINE

## 2025-05-04 PROCEDURE — 82010 KETONE BODYS QUAN: CPT

## 2025-05-04 PROCEDURE — 85610 PROTHROMBIN TIME: CPT

## 2025-05-04 PROCEDURE — 85730 THROMBOPLASTIN TIME PARTIAL: CPT

## 2025-05-04 PROCEDURE — 83605 ASSAY OF LACTIC ACID: CPT

## 2025-05-04 PROCEDURE — 87086 URINE CULTURE/COLONY COUNT: CPT

## 2025-05-04 RX ORDER — HYDRALAZINE HYDROCHLORIDE 20 MG/ML
5 INJECTION INTRAMUSCULAR; INTRAVENOUS EVERY 6 HOURS PRN
Status: DISCONTINUED | OUTPATIENT
Start: 2025-05-04 | End: 2025-05-06 | Stop reason: HOSPADM

## 2025-05-04 RX ORDER — MULTIVITAMIN WITH IRON
1 TABLET ORAL DAILY
Status: DISCONTINUED | OUTPATIENT
Start: 2025-05-04 | End: 2025-05-06 | Stop reason: HOSPADM

## 2025-05-04 RX ORDER — DEXTROSE MONOHYDRATE AND SODIUM CHLORIDE 5; .9 G/100ML; G/100ML
INJECTION, SOLUTION INTRAVENOUS CONTINUOUS
Status: DISCONTINUED | OUTPATIENT
Start: 2025-05-04 | End: 2025-05-05

## 2025-05-04 RX ORDER — VANCOMYCIN 1.5 G/300ML
1500 INJECTION, SOLUTION INTRAVENOUS ONCE
Status: COMPLETED | OUTPATIENT
Start: 2025-05-04 | End: 2025-05-04

## 2025-05-04 RX ORDER — DEXTROSE MONOHYDRATE 100 MG/ML
INJECTION, SOLUTION INTRAVENOUS CONTINUOUS PRN
Status: DISCONTINUED | OUTPATIENT
Start: 2025-05-04 | End: 2025-05-06 | Stop reason: HOSPADM

## 2025-05-04 RX ORDER — SODIUM CHLORIDE 0.9 % (FLUSH) 0.9 %
5-40 SYRINGE (ML) INJECTION PRN
Status: DISCONTINUED | OUTPATIENT
Start: 2025-05-04 | End: 2025-05-06 | Stop reason: HOSPADM

## 2025-05-04 RX ORDER — SODIUM CHLORIDE 9 MG/ML
INJECTION, SOLUTION INTRAVENOUS PRN
Status: DISCONTINUED | OUTPATIENT
Start: 2025-05-04 | End: 2025-05-06 | Stop reason: HOSPADM

## 2025-05-04 RX ORDER — IOPAMIDOL 612 MG/ML
100 INJECTION, SOLUTION INTRAVASCULAR
Status: COMPLETED | OUTPATIENT
Start: 2025-05-04 | End: 2025-05-04

## 2025-05-04 RX ORDER — POLYETHYLENE GLYCOL 3350 17 G/17G
17 POWDER, FOR SOLUTION ORAL DAILY PRN
Status: DISCONTINUED | OUTPATIENT
Start: 2025-05-04 | End: 2025-05-06 | Stop reason: HOSPADM

## 2025-05-04 RX ORDER — SODIUM CHLORIDE 0.9 % (FLUSH) 0.9 %
5-40 SYRINGE (ML) INJECTION EVERY 12 HOURS SCHEDULED
Status: DISCONTINUED | OUTPATIENT
Start: 2025-05-04 | End: 2025-05-06 | Stop reason: HOSPADM

## 2025-05-04 RX ORDER — FOLIC ACID 1 MG/1
1 TABLET ORAL DAILY
Status: DISCONTINUED | OUTPATIENT
Start: 2025-05-04 | End: 2025-05-06 | Stop reason: HOSPADM

## 2025-05-04 RX ORDER — SODIUM CHLORIDE, SODIUM LACTATE, POTASSIUM CHLORIDE, AND CALCIUM CHLORIDE .6; .31; .03; .02 G/100ML; G/100ML; G/100ML; G/100ML
30 INJECTION, SOLUTION INTRAVENOUS ONCE
Status: COMPLETED | OUTPATIENT
Start: 2025-05-04 | End: 2025-05-04

## 2025-05-04 RX ORDER — THIAMINE HYDROCHLORIDE 100 MG/ML
100 INJECTION, SOLUTION INTRAMUSCULAR; INTRAVENOUS DAILY
Status: DISCONTINUED | OUTPATIENT
Start: 2025-05-04 | End: 2025-05-06 | Stop reason: HOSPADM

## 2025-05-04 RX ADMIN — Medication 1 MG: at 21:38

## 2025-05-04 RX ADMIN — THERA TABS 1 TABLET: TAB at 21:38

## 2025-05-04 RX ADMIN — THIAMINE HYDROCHLORIDE 100 MG: 100 INJECTION, SOLUTION INTRAMUSCULAR; INTRAVENOUS at 21:38

## 2025-05-04 RX ADMIN — SODIUM CHLORIDE, PRESERVATIVE FREE 10 ML: 5 INJECTION INTRAVENOUS at 23:39

## 2025-05-04 RX ADMIN — IOPAMIDOL 100 ML: 612 INJECTION, SOLUTION INTRAVENOUS at 11:29

## 2025-05-04 RX ADMIN — DEXTROSE AND SODIUM CHLORIDE: 5; 900 INJECTION, SOLUTION INTRAVENOUS at 21:37

## 2025-05-04 RX ADMIN — CEFEPIME 2000 MG: 2 INJECTION, POWDER, FOR SOLUTION INTRAVENOUS at 21:38

## 2025-05-04 RX ADMIN — SODIUM CHLORIDE, SODIUM LACTATE, POTASSIUM CHLORIDE, AND CALCIUM CHLORIDE 2178 ML: .6; .31; .03; .02 INJECTION, SOLUTION INTRAVENOUS at 10:04

## 2025-05-04 RX ADMIN — VANCOMYCIN 1500 MG: 1.5 INJECTION, SOLUTION INTRAVENOUS at 22:06

## 2025-05-04 RX ADMIN — Medication 1 MG: at 08:55

## 2025-05-04 ASSESSMENT — PAIN SCALES - GENERAL: PAINLEVEL_OUTOF10: 0

## 2025-05-04 NOTE — ED NOTES
Bedside shift report given to CHLOÉ Denny.      Patient linen and gown changed.  
IM glucagon given by Nereida LUEVANO to right vastus lateralis per verbal order Dr. Walls   
POC Lactic 9.08, Dr. Walls aware.   
Patient placed on bear hugger. Provider wants hot line warmer as well. Nurse stated concern for over warming patient and causing hypotension and cardiac arrhythmias. MD wants both done.   
Provider gave verbal to Primary nurse Jeannine to place warm fluids and reina hugger. This charge RN noted their was no temparature captured for this ED visit. Did advise RN and nursing staff to assess core temperature with a rectal continuous monitor. Rectal tem 90. Did verbalize concern to MD for possibility of shock causing hypotension and cardiac arrhythmias d/t rapid re-warming. Nurse inquire if reina hugger alone could be used for now  and hold off on warm fluids for now. Provider heard nurse concerns and would still like both to be implemented. Primary nurse aware. Pt is on cont monitor. Pt updated on plan of care at this time.   
    CONTINUE taking these medications      glucose monitoring kit  1 kit by Does not apply route daily     Lancets Misc  1 each by Does not apply route daily            ASK your doctor about these medications      acetaminophen 325 MG tablet  Commonly known as: TYLENOL     amLODIPine 10 MG tablet  Commonly known as: NORVASC  Take 1 tablet by mouth nightly     blood glucose test strips  Test 1 time a day & as needed for symptoms of irregular blood glucose. Dispense sufficient amount for indicated testing frequency plus additional to accommodate PRN testing needs.     folic acid 1 MG tablet  Commonly known as: FOLVITE  Take 1 tablet by mouth daily     hydrALAZINE 50 MG tablet  Commonly known as: APRESOLINE  Take 1 tablet by mouth every 8 hours     ibuprofen 600 MG tablet  Commonly known as: IBU  Take 1 tablet by mouth every 6 hours as needed for Pain     lisinopril 10 MG tablet  Commonly known as: PRINIVIL;ZESTRIL  Take 1 tablet by mouth daily     MVI (CELEBRATE) CHEWABLE TABLET  Take 1 tablet by mouth daily     naproxen 250 MG tablet  Commonly known as: NAPROSYN  Take 1 tablet by mouth 2 times daily (with meals)     tamsulosin 0.4 MG capsule  Commonly known as: FLOMAX  Take 1 capsule by mouth daily     vitamin B-1 100 MG tablet  Commonly known as: THIAMINE  Take 1 tablet by mouth daily                DISCONTINUED MEDICATIONS:  Current Discharge Medication List            Valentín Salmon MD  LT, MC, USN  NMCP EM PGY1      (Please note that parts of this dictation were completed with voice recognition software. Quite often unanticipated grammatical, syntax, homophones, and other interpretive errors are inadvertently transcribed by the computer software. Please disregards these errors. Please excuse any errors that have escaped final proofreading.)

## 2025-05-04 NOTE — H&P
non-tender. Not distended.  Bowel sounds normal.   Extremities: No cyanosis.  No edema. Pulses 1+ b/l  Neurologic: Alert and oriented X 3.  Follows commands, responds appropriately. No focal neurological deficit was noted  Skin:                Warm and dry.  No rashes.         LAB DATA REVIEWED:    No components found for: \"GLPOC\"  Recent Labs     05/04/25  0944 05/04/25  1300    138   K 4.2 3.6   CL 99 103   CO2 16* 14*   BUN 18 16   PHOS 4.3  --    WBC 7.9  --    HGB 11.4*  --    HCT 36.0  --      --        Recent Results (from the past 24 hours)   POCT Glucose    Collection Time: 05/04/25  8:24 AM   Result Value Ref Range    POC Glucose 34 (LL) 70 - 110 mg/dL   POCT Glucose    Collection Time: 05/04/25  8:25 AM   Result Value Ref Range    POC Glucose 38 (LL) 70 - 110 mg/dL   POCT Blood Gas & Electrolytes    Collection Time: 05/04/25  8:32 AM   Result Value Ref Range    POC Ionized Calcium 1.03 (L) 1.15 - 1.33 mmol/L    Base Deficit (POC) 11.4 mmol/L    POC HCO3 15.5 (L) 21 - 28 MMOL/L    POC TCO2 15 (L) 22 - 29 MMOL/L    POC O2 SAT 88 (L) 94 - 98 %    Source VENOUS BLOOD      Performed by: Warner Leyva     POC Sodium 146 (H) 136 - 145 mmol/L    POC Potassium 3.0 (L) 3.5 - 5.1 mmol/L    POC Glucose 33 (LL) 74 - 99 mg/dL    POC Creatinine 1.01 0.6 - 1.3 mg/dL    POC Lactic Acid 6.42 (HH) 0.40 - 2.00 mmol/L    POC Chloride 112 (H) 98 - 107 mmol/L    Anion Gap, POC Cannot be calculated  Cannot be calculated   10 - 20 mmol/L    PH, VENOUS (POC) 7.22 (L) 7.32 - 7.42      PCO2, Noni, POC 37.7 (L) 41 - 51 MMHG    PO2, VENOUS (POC) 65 (H) 25 - 40 mmHg    eGFR, POC 89 >60 ml/min/1.73m2   POCT Glucose    Collection Time: 05/04/25  8:52 AM   Result Value Ref Range    POC Glucose 41 (LL) 70 - 110 mg/dL   POCT Glucose    Collection Time: 05/04/25  9:17 AM   Result Value Ref Range    POC Glucose 56 (L) 70 - 110 mg/dL   Urine Drug Screen    Collection Time: 05/04/25  9:19 AM   Result Value Ref Range

## 2025-05-04 NOTE — ED PROVIDER NOTES
Central Line    Date/Time: 5/4/2025 9:42 AM    Performed by: Suman Walls III, MD  Authorized by: Suman Walls III, MD    Consent:     Consent obtained:  Emergent situation  Universal protocol:     Patient identity confirmed:  Arm band  Pre-procedure details:     Indication(s): central venous access and insufficient peripheral access      Hand hygiene: Hand hygiene performed prior to insertion      Sterile barrier technique: All elements of maximal sterile technique followed      Skin preparation:  Chlorhexidine    Skin preparation agent: Skin preparation agent completely dried prior to procedure    Sedation:     Sedation type:  None  Anesthesia:     Anesthesia method:  Local infiltration    Local anesthetic:  Lidocaine 1% w/o epi  Procedure details:     Location:  L femoral    Patient position:  Supine    Procedural supplies:  Cordis    Landmarks identified: yes      Ultrasound guidance: yes      Ultrasound guidance timing: prior to insertion and real time      Sterile ultrasound techniques: Sterile gel and sterile probe covers were used      Number of attempts:  2    Successful placement: yes (Patient moved during procedure resulting in Cordis being slightly positional)    Post-procedure details:     Post-procedure:  Dressing applied and line sutured    Assessment:  Blood return through all ports and free fluid flow (Slightly positional)    Procedure completion:  Tolerated          Suman Walls III, MD  05/04/25 0952

## 2025-05-05 PROBLEM — R79.89 ABNORMAL LFTS: Status: ACTIVE | Noted: 2025-05-05

## 2025-05-05 LAB
ALBUMIN SERPL-MCNC: 3.3 G/DL (ref 3.4–5)
ALBUMIN/GLOB SERPL: 1.1 (ref 0.8–1.7)
ALP SERPL-CCNC: 111 U/L (ref 45–117)
ALT SERPL-CCNC: 348 U/L (ref 10–50)
ANION GAP SERPL CALC-SCNC: 12 MMOL/L (ref 3–18)
AST SERPL-CCNC: 653 U/L (ref 10–38)
BACTERIA SPEC CULT: NORMAL
BASOPHILS # BLD: 0.01 K/UL (ref 0–0.1)
BASOPHILS NFR BLD: 0.1 % (ref 0–2)
BILIRUB SERPL-MCNC: 0.7 MG/DL (ref 0.2–1)
BUN SERPL-MCNC: 15 MG/DL (ref 6–23)
BUN/CREAT SERPL: 14 (ref 12–20)
CALCIUM SERPL-MCNC: 8.9 MG/DL (ref 8.5–10.1)
CHLORIDE SERPL-SCNC: 103 MMOL/L (ref 98–107)
CK SERPL-CCNC: 292 U/L (ref 39–308)
CO2 SERPL-SCNC: 23 MMOL/L (ref 21–32)
CREAT SERPL-MCNC: 1.05 MG/DL (ref 0.6–1.3)
DIFFERENTIAL METHOD BLD: ABNORMAL
EKG ATRIAL RATE: 44 BPM
EKG DIAGNOSIS: NORMAL
EKG P AXIS: 80 DEGREES
EKG P-R INTERVAL: 178 MS
EKG Q-T INTERVAL: 594 MS
EKG QRS DURATION: 114 MS
EKG QTC CALCULATION (BAZETT): 507 MS
EKG R AXIS: 76 DEGREES
EKG T AXIS: 63 DEGREES
EKG VENTRICULAR RATE: 44 BPM
EOSINOPHIL # BLD: 0.06 K/UL (ref 0–0.4)
EOSINOPHIL NFR BLD: 0.7 % (ref 0–5)
ERYTHROCYTE [DISTWIDTH] IN BLOOD BY AUTOMATED COUNT: 13.4 % (ref 11.6–14.5)
FERRITIN SERPL-MCNC: 776 NG/ML (ref 13–400)
GLOBULIN SER CALC-MCNC: 3 G/DL (ref 2–4)
GLUCOSE BLD STRIP.AUTO-MCNC: 107 MG/DL (ref 70–110)
GLUCOSE BLD STRIP.AUTO-MCNC: 110 MG/DL (ref 70–110)
GLUCOSE BLD STRIP.AUTO-MCNC: 112 MG/DL (ref 70–110)
GLUCOSE BLD STRIP.AUTO-MCNC: 121 MG/DL (ref 70–110)
GLUCOSE BLD STRIP.AUTO-MCNC: 128 MG/DL (ref 70–110)
GLUCOSE BLD STRIP.AUTO-MCNC: 140 MG/DL (ref 70–110)
GLUCOSE BLD STRIP.AUTO-MCNC: 147 MG/DL (ref 70–110)
GLUCOSE SERPL-MCNC: 108 MG/DL (ref 74–108)
HAV IGM SER QL: NONREACTIVE
HAV IGM SER QL: NONREACTIVE
HBV CORE IGM SER QL: NONREACTIVE
HBV SURFACE AB SERPL IA-ACNC: <3.5 MIU/ML
HBV SURFACE AG SER QL: NONREACTIVE
HBV SURFACE AG SER QL: NONREACTIVE
HCT VFR BLD AUTO: 30.1 % (ref 36–48)
HCV AB SER QL: NONREACTIVE
HGB BLD-MCNC: 10.4 G/DL (ref 13–16)
IMM GRANULOCYTES # BLD AUTO: 0.02 K/UL (ref 0–0.04)
IMM GRANULOCYTES NFR BLD AUTO: 0.2 % (ref 0–0.5)
INR PPP: 1.1 (ref 0.9–1.1)
IRON SATN MFR SERPL: 25 %
IRON SERPL-MCNC: 64 UG/DL (ref 50–175)
LACTATE SERPL-SCNC: 1 MMOL/L (ref 0.4–2)
LYMPHOCYTES # BLD: 1.12 K/UL (ref 0.9–3.6)
LYMPHOCYTES NFR BLD: 12.9 % (ref 21–52)
MAGNESIUM SERPL-MCNC: 1.6 MG/DL (ref 1.6–2.6)
MCH RBC QN AUTO: 33.4 PG (ref 24–34)
MCHC RBC AUTO-ENTMCNC: 34.6 G/DL (ref 31–37)
MCV RBC AUTO: 96.8 FL (ref 78–100)
MONOCYTES # BLD: 0.68 K/UL (ref 0.05–1.2)
MONOCYTES NFR BLD: 7.8 % (ref 3–10)
NEUTS SEG # BLD: 6.79 K/UL (ref 1.8–8)
NEUTS SEG NFR BLD: 78.3 % (ref 40–73)
NRBC # BLD: 0 K/UL (ref 0–0.01)
NRBC BLD-RTO: 0 PER 100 WBC
PLATELET # BLD AUTO: 192 K/UL (ref 135–420)
PMV BLD AUTO: 10.3 FL (ref 9.2–11.8)
POTASSIUM SERPL-SCNC: 3.5 MMOL/L (ref 3.5–5.5)
PROCALCITONIN SERPL-MCNC: 1.56 NG/ML
PROT SERPL-MCNC: 6.3 G/DL (ref 6.4–8.2)
PROTHROMBIN TIME: 13.9 SEC (ref 11.9–14.9)
RBC # BLD AUTO: 3.11 M/UL (ref 4.35–5.65)
SERVICE CMNT-IMP: NORMAL
SODIUM SERPL-SCNC: 138 MMOL/L (ref 136–145)
TIBC SERPL-MCNC: 259 UG/DL (ref 250–450)
UIBC SERPL-MCNC: 195 UG/DL (ref 112–347)
WBC # BLD AUTO: 8.7 K/UL (ref 4.6–13.2)

## 2025-05-05 PROCEDURE — 83735 ASSAY OF MAGNESIUM: CPT

## 2025-05-05 PROCEDURE — 86015 ACTIN ANTIBODY EACH: CPT

## 2025-05-05 PROCEDURE — 86704 HEP B CORE ANTIBODY TOTAL: CPT

## 2025-05-05 PROCEDURE — 6360000002 HC RX W HCPCS: Performed by: EMERGENCY MEDICINE

## 2025-05-05 PROCEDURE — 85610 PROTHROMBIN TIME: CPT

## 2025-05-05 PROCEDURE — 36415 COLL VENOUS BLD VENIPUNCTURE: CPT

## 2025-05-05 PROCEDURE — 93976 VASCULAR STUDY: CPT | Performed by: INTERNAL MEDICINE

## 2025-05-05 PROCEDURE — 86803 HEPATITIS C AB TEST: CPT

## 2025-05-05 PROCEDURE — 83605 ASSAY OF LACTIC ACID: CPT

## 2025-05-05 PROCEDURE — 86708 HEPATITIS A ANTIBODY: CPT

## 2025-05-05 PROCEDURE — 86706 HEP B SURFACE ANTIBODY: CPT

## 2025-05-05 PROCEDURE — 87340 HEPATITIS B SURFACE AG IA: CPT

## 2025-05-05 PROCEDURE — 94761 N-INVAS EAR/PLS OXIMETRY MLT: CPT

## 2025-05-05 PROCEDURE — 1100000003 HC PRIVATE W/ TELEMETRY

## 2025-05-05 PROCEDURE — 85025 COMPLETE CBC W/AUTO DIFF WBC: CPT

## 2025-05-05 PROCEDURE — 82962 GLUCOSE BLOOD TEST: CPT

## 2025-05-05 PROCEDURE — 86381 MITOCHONDRIAL ANTIBODY EACH: CPT

## 2025-05-05 PROCEDURE — 83540 ASSAY OF IRON: CPT

## 2025-05-05 PROCEDURE — 93010 ELECTROCARDIOGRAM REPORT: CPT | Performed by: INTERNAL MEDICINE

## 2025-05-05 PROCEDURE — 84145 PROCALCITONIN (PCT): CPT

## 2025-05-05 PROCEDURE — 6370000000 HC RX 637 (ALT 250 FOR IP): Performed by: EMERGENCY MEDICINE

## 2025-05-05 PROCEDURE — 99232 SBSQ HOSP IP/OBS MODERATE 35: CPT | Performed by: HOSPITALIST

## 2025-05-05 PROCEDURE — 2500000003 HC RX 250 WO HCPCS: Performed by: EMERGENCY MEDICINE

## 2025-05-05 PROCEDURE — 82550 ASSAY OF CK (CPK): CPT

## 2025-05-05 PROCEDURE — 2580000003 HC RX 258: Performed by: EMERGENCY MEDICINE

## 2025-05-05 PROCEDURE — 82728 ASSAY OF FERRITIN: CPT

## 2025-05-05 PROCEDURE — 86709 HEPATITIS A IGM ANTIBODY: CPT

## 2025-05-05 PROCEDURE — 83550 IRON BINDING TEST: CPT

## 2025-05-05 PROCEDURE — 80053 COMPREHEN METABOLIC PANEL: CPT

## 2025-05-05 RX ADMIN — CEFEPIME 2000 MG: 2 INJECTION, POWDER, FOR SOLUTION INTRAVENOUS at 20:56

## 2025-05-05 RX ADMIN — THIAMINE HYDROCHLORIDE 100 MG: 100 INJECTION, SOLUTION INTRAMUSCULAR; INTRAVENOUS at 09:44

## 2025-05-05 RX ADMIN — DEXTROSE AND SODIUM CHLORIDE: 5; 900 INJECTION, SOLUTION INTRAVENOUS at 12:07

## 2025-05-05 RX ADMIN — SODIUM CHLORIDE, PRESERVATIVE FREE 10 ML: 5 INJECTION INTRAVENOUS at 20:56

## 2025-05-05 RX ADMIN — CEFEPIME 2000 MG: 2 INJECTION, POWDER, FOR SOLUTION INTRAVENOUS at 04:33

## 2025-05-05 RX ADMIN — THERA TABS 1 TABLET: TAB at 09:44

## 2025-05-05 RX ADMIN — SODIUM CHLORIDE, PRESERVATIVE FREE 10 ML: 5 INJECTION INTRAVENOUS at 09:46

## 2025-05-05 RX ADMIN — Medication 1 MG: at 09:44

## 2025-05-05 RX ADMIN — VANCOMYCIN HYDROCHLORIDE 1000 MG: 1 INJECTION, POWDER, LYOPHILIZED, FOR SOLUTION INTRAVENOUS at 12:06

## 2025-05-05 RX ADMIN — CEFEPIME 2000 MG: 2 INJECTION, POWDER, FOR SOLUTION INTRAVENOUS at 13:38

## 2025-05-05 ASSESSMENT — PAIN SCALES - GENERAL
PAINLEVEL_OUTOF10: 0
PAINLEVEL_OUTOF10: 0

## 2025-05-05 NOTE — PLAN OF CARE
Problem: Safety - Adult  Goal: Free from fall injury  Outcome: Progressing  Flowsheets (Taken 5/5/2025 0411)  Free From Fall Injury: Instruct family/caregiver on patient safety     Problem: Chronic Conditions and Co-morbidities  Goal: Patient's chronic conditions and co-morbidity symptoms are monitored and maintained or improved  Outcome: Progressing  Flowsheets (Taken 5/5/2025 0411)  Care Plan - Patient's Chronic Conditions and Co-Morbidity Symptoms are Monitored and Maintained or Improved:   Monitor and assess patient's chronic conditions and comorbid symptoms for stability, deterioration, or improvement   Collaborate with multidisciplinary team to address chronic and comorbid conditions and prevent exacerbation or deterioration   Update acute care plan with appropriate goals if chronic or comorbid symptoms are exacerbated and prevent overall improvement and discharge     Problem: Pain  Goal: Verbalizes/displays adequate comfort level or baseline comfort level  Outcome: Progressing  Flowsheets (Taken 5/5/2025 0411)  Verbalizes/displays adequate comfort level or baseline comfort level:   Encourage patient to monitor pain and request assistance   Assess pain using appropriate pain scale   Administer analgesics based on type and severity of pain and evaluate response   Implement non-pharmacological measures as appropriate and evaluate response   Consider cultural and social influences on pain and pain management     Problem: Discharge Planning  Goal: Discharge to home or other facility with appropriate resources  Outcome: Progressing  Flowsheets  Taken 5/5/2025 0411  Discharge to home or other facility with appropriate resources: Identify barriers to discharge with patient and caregiver  Taken 5/4/2025 2229  Discharge to home or other facility with appropriate resources: Identify barriers to discharge with patient and caregiver

## 2025-05-05 NOTE — CARE COORDINATION
05/05/25 1610   Service Assessment   Patient Orientation Alert and Oriented;Person;Place;Situation   Cognition Alert   History Provided By Patient   Primary Caregiver Self   Support Systems Family Members   Patient's Healthcare Decision Maker is: Patient Declined (Legal Next of Kin Remains as Decision Maker)   PCP Verified by CM Yes  (Unsure but a local clinic in [facility], last visit 3 months ago.)   Last Visit to PCP Within last 3 months   Prior Functional Level Independent in ADLs/IADLs   Current Functional Level Independent in ADLs/IADLs   Can patient return to prior living arrangement Yes   Ability to make needs known: Good   Family able to assist with home care needs: Yes   Would you like for me to discuss the discharge plan with any other family members/significant others, and if so, who? No   Financial Resources Medicaid   Community Resources None   Social/Functional History   Lives With Family  (Sister and Brother in law)   Type of Home Apartment   Home Layout One level   Home Access Level entry   Bathroom Shower/Tub Tub/Shower unit   Bathroom Toilet Standard   Bathroom Equipment Grab bars in shower;Shower chair   Bathroom Accessibility Accessible   Home Equipment None   Receives Help From Family   Prior Level of Assist for ADLs Independent   Prior Level of Assist for Homemaking Independent   Homemaking Responsibilities Yes   Ambulation Assistance Independent   Prior Level of Assist for Transfers Independent   Active  No   Patient's  Info [facility] [facility]   Occupation Unemployed   Discharge Planning   Type of Residence Apartment   Living Arrangements Family Members   Current Services Prior To Admission None   Potential Assistance Needed N/A   DME Ordered? No   Potential Assistance Purchasing Medications No   Type of Home Care Services None   Patient expects to be discharged to: House   One/Two Story Residence One story   History of falls? 0   Services At/After Discharge   Transition of Care

## 2025-05-05 NOTE — CONSULTS
[email or web address]  [phone]    GASTROENTEROLOGY CONSULT      Impression:   54-year-old male with a past medical history of hypertension and alcoholism, presented to ER with c/o SOB after he was found down outside and brought in by EMS. Upon admission was found to have hypoglycemia, be hypothermic, have lactic acidosis, elevated LFTs.    Elevated liver enzymes: Hepatocellular pattern of AST:ALT > 2:1 which is characteristic of heavy alcohol exposure. Acetaminophen levels nl. Findings are likely related to daily ingestion of alcohol above sensible limits, may also be related to shock liver in setting of systemic findings upon arrival to ER.   - ,   - tBili, Alk Phos, lipase wnl  - CT findings of hepatic steatosis    Anemia: acute on chronic, normocytic with RDW wnl. Hgb drop to 10.4 from 11.4 on admit. Historically ~12-13 on chart review. Differentials include anemia of chronic disease and possible slow GIB  without overt signs of GIB; pt denies hematochezia, melena, and hematemesis.    No known GI hx or screening    Plan:     - Serology to rule out contributors to elevated liver enzymes including autoimmune and infectious etiologies.   - Trend liver enzymes daily  - Recommend iron panel and ferritin for anemia work up  - Recommend CK serology to rule out rhabdomyolysis as contributing factor to elevated transaminases.       Chief Complaint: Elevated liver enzymes      HPI:  Arnold Keys is a 54 y.o. male who I am being asked to see in consultation for an opinion regarding elevated liver enzymes. Pt states that he has been drinking alcohol daily, about 4-5 airplane shooters per day of vodka. He states he wasn't eating enough and is recently homeless which lead to him having low blood sugar and laying down outside to sleep in the rain causing hypothermia.    PMH:   Past Medical History:   Diagnosis Date    Alcoholism (HCC)     Hypertension     Ill-defined condition     ETOH

## 2025-05-06 VITALS
HEART RATE: 66 BPM | OXYGEN SATURATION: 100 % | RESPIRATION RATE: 16 BRPM | HEIGHT: 70 IN | TEMPERATURE: 99.3 F | DIASTOLIC BLOOD PRESSURE: 91 MMHG | SYSTOLIC BLOOD PRESSURE: 143 MMHG | BODY MASS INDEX: 22.95 KG/M2 | WEIGHT: 160.27 LBS

## 2025-05-06 LAB
ALBUMIN SERPL-MCNC: 3.3 G/DL (ref 3.4–5)
ALBUMIN/GLOB SERPL: 1 (ref 0.8–1.7)
ALP SERPL-CCNC: 101 U/L (ref 45–117)
ALT SERPL-CCNC: 207 U/L (ref 10–50)
ANION GAP SERPL CALC-SCNC: 11 MMOL/L (ref 3–18)
APPEARANCE UR: CLEAR
AST SERPL-CCNC: 153 U/L (ref 10–38)
BASOPHILS # BLD: 0.03 K/UL (ref 0–0.1)
BASOPHILS NFR BLD: 0.4 % (ref 0–2)
BILIRUB DIRECT SERPL-MCNC: 0.2 MG/DL (ref 0–0.2)
BILIRUB SERPL-MCNC: 0.7 MG/DL (ref 0.2–1)
BILIRUB UR QL: NEGATIVE
BUN SERPL-MCNC: 9 MG/DL (ref 6–23)
BUN/CREAT SERPL: 8 (ref 12–20)
CALCIUM SERPL-MCNC: 9.6 MG/DL (ref 8.5–10.1)
CHLORIDE SERPL-SCNC: 105 MMOL/L (ref 98–107)
CO2 SERPL-SCNC: 25 MMOL/L (ref 21–32)
COLOR UR: YELLOW
CREAT SERPL-MCNC: 1.11 MG/DL (ref 0.6–1.3)
DIFFERENTIAL METHOD BLD: ABNORMAL
EOSINOPHIL # BLD: 0.2 K/UL (ref 0–0.4)
EOSINOPHIL NFR BLD: 2.8 % (ref 0–5)
ERYTHROCYTE [DISTWIDTH] IN BLOOD BY AUTOMATED COUNT: 13.6 % (ref 11.6–14.5)
GLOBULIN SER CALC-MCNC: 3.4 G/DL (ref 2–4)
GLUCOSE BLD STRIP.AUTO-MCNC: 98 MG/DL (ref 70–110)
GLUCOSE SERPL-MCNC: 85 MG/DL (ref 74–108)
GLUCOSE UR STRIP.AUTO-MCNC: NEGATIVE MG/DL
HAV AB SER QL IA: NEGATIVE
HBV CORE AB SERPL QL IA: NEGATIVE
HCT VFR BLD AUTO: 32.6 % (ref 36–48)
HCV AB SERPL QL IA: NORMAL
HCV IGG SERPL QL IA: NON REACTIVE S/CO RATIO
HGB BLD-MCNC: 10.9 G/DL (ref 13–16)
HGB UR QL STRIP: NEGATIVE
IMM GRANULOCYTES # BLD AUTO: 0.03 K/UL (ref 0–0.04)
IMM GRANULOCYTES NFR BLD AUTO: 0.4 % (ref 0–0.5)
KETONES UR QL STRIP.AUTO: NEGATIVE MG/DL
LEUKOCYTE ESTERASE UR QL STRIP.AUTO: NEGATIVE
LYMPHOCYTES # BLD: 1.73 K/UL (ref 0.9–3.6)
LYMPHOCYTES NFR BLD: 24.2 % (ref 21–52)
MCH RBC QN AUTO: 33.2 PG (ref 24–34)
MCHC RBC AUTO-ENTMCNC: 33.4 G/DL (ref 31–37)
MCV RBC AUTO: 99.4 FL (ref 78–100)
MONOCYTES # BLD: 0.82 K/UL (ref 0.05–1.2)
MONOCYTES NFR BLD: 11.5 % (ref 3–10)
NEUTS SEG # BLD: 4.34 K/UL (ref 1.8–8)
NEUTS SEG NFR BLD: 60.7 % (ref 40–73)
NITRITE UR QL STRIP.AUTO: NEGATIVE
NRBC # BLD: 0 K/UL (ref 0–0.01)
NRBC BLD-RTO: 0 PER 100 WBC
PH UR STRIP: 6.5 (ref 5–8)
PLATELET # BLD AUTO: 178 K/UL (ref 135–420)
PMV BLD AUTO: 10 FL (ref 9.2–11.8)
POTASSIUM SERPL-SCNC: 3.6 MMOL/L (ref 3.5–5.5)
PROCALCITONIN SERPL-MCNC: 0.96 NG/ML
PROT SERPL-MCNC: 6.7 G/DL (ref 6.4–8.2)
PROT UR STRIP-MCNC: NEGATIVE MG/DL
RBC # BLD AUTO: 3.28 M/UL (ref 4.35–5.65)
SODIUM SERPL-SCNC: 141 MMOL/L (ref 136–145)
SP GR UR REFRACTOMETRY: 1.01 (ref 1–1.03)
UROBILINOGEN UR QL STRIP.AUTO: 0.2 EU/DL (ref 0.2–1)
VANCOMYCIN SERPL-MCNC: 18 UG/ML (ref 5–40)
WBC # BLD AUTO: 7.2 K/UL (ref 4.6–13.2)

## 2025-05-06 PROCEDURE — 84145 PROCALCITONIN (PCT): CPT

## 2025-05-06 PROCEDURE — 2500000003 HC RX 250 WO HCPCS: Performed by: EMERGENCY MEDICINE

## 2025-05-06 PROCEDURE — 80202 ASSAY OF VANCOMYCIN: CPT

## 2025-05-06 PROCEDURE — 85025 COMPLETE CBC W/AUTO DIFF WBC: CPT

## 2025-05-06 PROCEDURE — 82962 GLUCOSE BLOOD TEST: CPT

## 2025-05-06 PROCEDURE — 80048 BASIC METABOLIC PNL TOTAL CA: CPT

## 2025-05-06 PROCEDURE — 80076 HEPATIC FUNCTION PANEL: CPT

## 2025-05-06 PROCEDURE — 99239 HOSP IP/OBS DSCHRG MGMT >30: CPT | Performed by: HOSPITALIST

## 2025-05-06 PROCEDURE — 2580000003 HC RX 258: Performed by: EMERGENCY MEDICINE

## 2025-05-06 PROCEDURE — 81003 URINALYSIS AUTO W/O SCOPE: CPT

## 2025-05-06 PROCEDURE — 6360000002 HC RX W HCPCS: Performed by: EMERGENCY MEDICINE

## 2025-05-06 PROCEDURE — 36415 COLL VENOUS BLD VENIPUNCTURE: CPT

## 2025-05-06 PROCEDURE — 6370000000 HC RX 637 (ALT 250 FOR IP): Performed by: EMERGENCY MEDICINE

## 2025-05-06 RX ORDER — FOLIC ACID 1 MG/1
1 TABLET ORAL DAILY
Qty: 30 TABLET | Refills: 0 | Status: SHIPPED | OUTPATIENT
Start: 2025-05-06

## 2025-05-06 RX ORDER — THIAMINE MONONITRATE (VIT B1) 100 MG
100 TABLET ORAL DAILY
Qty: 30 TABLET | Refills: 0 | Status: SHIPPED | OUTPATIENT
Start: 2025-05-06

## 2025-05-06 RX ORDER — MULTIVITAMIN WITH IRON
1 TABLET ORAL DAILY
Qty: 30 TABLET | Refills: 0 | Status: SHIPPED | OUTPATIENT
Start: 2025-05-06

## 2025-05-06 RX ORDER — AMLODIPINE BESYLATE 10 MG/1
10 TABLET ORAL NIGHTLY
Qty: 30 TABLET | Refills: 0 | Status: SHIPPED | OUTPATIENT
Start: 2025-05-06

## 2025-05-06 RX ADMIN — THERA TABS 1 TABLET: TAB at 11:04

## 2025-05-06 RX ADMIN — CEFEPIME 2000 MG: 2 INJECTION, POWDER, FOR SOLUTION INTRAVENOUS at 05:00

## 2025-05-06 RX ADMIN — SODIUM CHLORIDE, PRESERVATIVE FREE 10 ML: 5 INJECTION INTRAVENOUS at 11:04

## 2025-05-06 RX ADMIN — VANCOMYCIN HYDROCHLORIDE 1000 MG: 1 INJECTION, POWDER, LYOPHILIZED, FOR SOLUTION INTRAVENOUS at 00:37

## 2025-05-06 RX ADMIN — Medication 1 MG: at 11:04

## 2025-05-06 RX ADMIN — THIAMINE HYDROCHLORIDE 100 MG: 100 INJECTION, SOLUTION INTRAMUSCULAR; INTRAVENOUS at 11:04

## 2025-05-06 ASSESSMENT — PAIN SCALES - GENERAL
PAINLEVEL_OUTOF10: 0
PAINLEVEL_OUTOF10: 0

## 2025-05-06 NOTE — DISCHARGE SUMMARY
admission.       Procedures: None     Consults: NA    Imaging studies:     Ultrasound abdomen limited     History: As above, nonacute findings noted on recent CT     Comparison: Recent CT abdomen and pelvis     FINDINGS:     Liver is mildly enlarged measuring 18.3 cm and demonstrates coarsened  echotexture. No gross focal solid sonographically visible lesions are  demonstrated. Portal vein is also within normal limits.       Gallbladder wall is within normal limits. Negative sonographic Smith sign. No  pericholecystic fluid. Tiny echogenic foci within the gallbladder wall, may be  artifactual versus represent a tiny 2 mm polyp. No mobile gallstones. There is  no intra or extrahepatic biliary duct dilatation.  The common duct measures 0.3  cm.     The right kidney measures 13.4 x 5.9 x 4.5 cm, with preserved renal cortical  thickness and echogenicity. Overlying bowel gas limits evaluation of the  inferior pole. 5.0 x 4.4 x 4.9 cm cyst noted in the right superior pole. There  is no evidence of hydronephrosis or renal calculi. Pancreas are not well  visualized on today's study. No free fluid is seen in the right upper quadrant.     IMPRESSION:     No significant acute sonographic abnormality noted on this ultrasound study.     Coarsened hepatic echotexture, may be seen with hepatic steatosis or other  intrinsic liver disease.     Right renal cyst.        Electronically signed by Mariaelena Delcid    EXAM: XR CHEST PORTABLE     CLINICAL INDICATION/HISTORY : Provided with order: Sepsis.     COMPARISON: January 2, 2025     TECHNIQUE: 1 VIEWS     FINDINGS:  EKG leads and wires overlie the chest.  The lungs are clear.  The heart and mediastinum are unremarkable.  No evidence of pleural effusion.        IMPRESSION:  1.  No acute cardiopulmonary process    EXAM: CT CHEST ABDOMEN PELVIS W CONTRAST     CLINICAL INDICATION/HISTORY : Lactic acidosis, evaluating for unknown source of  infection.     COMPARISON: March 10, 2023

## 2025-05-06 NOTE — DISCHARGE INSTRUCTIONS
Discharge Instructions    Patient: Arnold Keys MRN: [id number]  CSN: [id number]    YOB: 1970  Age: 54 y.o.  Sex: male    DOA: 5/4/2025       DIET:  cardiac diet    ACTIVITY: activity as tolerated      ADDITIONAL INFORMATION: If you experience any of the following symptoms but not limited to Fever, chills, nausea, vomiting, diarrhea, change in mentation, falling, bleeding, shortness of breath, chest pain, please call your primary care physician or return to the emergency room if you cannot get hold of your doctor:     FOLLOW UP CARE:  PCP in 1 week     Isidro Klein MD  5/6/2025 10:33 AM  Patient armband removed and shreddedDischarge medications reviewed with the patient and appropriate educational materials and side effects teaching were provided.  .my  DISCHARGE SUMMARY from Nurse    PATIENT INSTRUCTIONS:    After general anesthesia or intravenous sedation, for 24 hours or while taking prescription Narcotics:  Limit your activities  Do not drive and operate hazardous machinery  Do not make important personal or business decisions  Do  not drink alcoholic beverages  If you have not urinated within 8 hours after discharge, please contact your surgeon on call.    Report the following to your surgeon:  Excessive pain, swelling, redness or odor of or around the surgical area  Temperature over 100.5  Nausea and vomiting lasting longer than 4 hours or if unable to take medications  Any signs of decreased circulation or nerve impairment to extremity: change in color, persistent  numbness, tingling, coldness or increase pain follow up with Emergency department or primary Doctor  Any questions    What to do at Home:  Recommended activity: activity as tolerated,     If you experience any of the following symptoms shortness of breath, chest pain,  cold or blue skin, confusion, , please follow up with .    *  Please give a list of your current medications to your Primary Care Provider.    *

## 2025-05-06 NOTE — PLAN OF CARE
Problem: Discharge Planning  Goal: Discharge to home or other facility with appropriate resources  Outcome: Progressing  Flowsheets (Taken 5/5/2025 0411 by Christine Levine RN)  Discharge to home or other facility with appropriate resources: Identify barriers to discharge with patient and caregiver     Problem: Pain  Goal: Verbalizes/displays adequate comfort level or baseline comfort level  5/6/2025 0529 by Jailyn Vitale RN  Outcome: Progressing  Flowsheets (Taken 5/6/2025 0328 by Olinda Kaur, RN)  Verbalizes/displays adequate comfort level or baseline comfort level:   Encourage patient to monitor pain and request assistance   Administer analgesics based on type and severity of pain and evaluate response   Assess pain using appropriate pain scale   Implement non-pharmacological measures as appropriate and evaluate response     Problem: Discharge Planning  Goal: Discharge to home or other facility with appropriate resources  Outcome: Progressing  Flowsheets (Taken 5/5/2025 0411 by Christine Levine RN)  Discharge to home or other facility with appropriate resources: Identify barriers to discharge with patient and caregiver     Problem: Pain  Goal: Verbalizes/displays adequate comfort level or baseline comfort level  5/6/2025 0529 by Jailyn Vitale RN  Outcome: Progressing  Flowsheets (Taken 5/6/2025 0328 by Olinda Kaur, RN)  Verbalizes/displays adequate comfort level or baseline comfort level:   Encourage patient to monitor pain and request assistance   Administer analgesics based on type and severity of pain and evaluate response   Assess pain using appropriate pain scale   Implement non-pharmacological measures as appropriate and evaluate response     Problem: Safety - Adult  Goal: Free from fall injury  5/6/2025 0529 by Jailyn Vitale RN  Outcome: Progressing  Flowsheets (Taken 5/6/2025 0328 by Olinda Kaur RN)  Free From Fall Injury: Instruct family/caregiver on patient safety

## 2025-05-06 NOTE — PLAN OF CARE
Problem: Discharge Planning  Goal: Discharge to home or other facility with appropriate resources  5/6/2025 1206 by Sarah Castillo RN  Outcome: Progressing  Note: Patient for discharge today     Problem: Safety - Adult  Goal: Free from fall injury  5/6/2025 1206 by Sarah Castillo, RN  Outcome: Progressing  Note: Hourly round  Fall precautions in place

## 2025-05-06 NOTE — PLAN OF CARE
Problem: Chronic Conditions and Co-morbidities  Goal: Patient's chronic conditions and co-morbidity symptoms are monitored and maintained or improved  5/6/2025 1207 by Sarah Casitllo RN  Outcome: Progressing  Note: Monitor blood pressure and electrolytes

## 2025-05-06 NOTE — PROGRESS NOTES
Pharmacy Dosing Service - Cefepime     Arnold Keys is a 54 y.o. male starting on cefepime therapy for Sepsis of Unknown Etiology. Pharmacy consulted for monitoring and adjustment.    Additional Antimicrobials: Vancomycin    Pertinent Laboratory Values:   Temp: 99.4 °F (37.4 °C)  Recent Labs     05/04/25  0944 05/04/25  1300   CREATININE 0.98 0.85   BUN 18 16   WBC 7.9  --    PROCAL 0.04  --      CrCl cannot be calculated (Unknown ideal weight.).    Wt Readings from Last 1 Encounters:   03/28/25 72.6 kg (160 lb)   Est CrCl = 103.2 mL/min     Pertinent Cultures:  Culture Date Source Results   5/4 Blood  In process   5/4 Urine  In process      Plan:  Per [facility] Lactam Policy, start cefepime 2000 mg once as IV push followed by 2000 mg Q8H given over 240 minutes  Pharmacy will continue to monitor patient and adjust therapy as indicated    Thank you for the consult,  Eun Chow RPH  5/4/2025    
4 Eyes Skin Assessment     NAME:  Arnold Keys  YOB: 1970  MEDICAL RECORD NUMBER:  [id number]    The patient is being assessed for  Admission    I agree that at least one RN has performed a thorough Head to Toe Skin Assessment on the patient. ALL assessment sites listed below have been assessed.      Areas assessed by both nurses:    Head, Face, Ears, Shoulders, Back, Chest, Arms, Elbows, Hands, Sacrum. Buttock, Coccyx, Ischium, and Legs. Feet and Heels        Does the Patient have a Wound? No noted wound(s)       Kannan Prevention initiated by RN: Yes  Wound Care Orders initiated by RN: No    Pressure Injury (Stage 3,4, Unstageable, DTI, NWPT, and Complex wounds) if present, place Wound referral order by RN under : No    New Ostomies, if present place, Ostomy referral order under : No     Nurse 1 eSignature: Electronically signed by Christine Levine RN on 5/5/25 at 1:07 AM EDT    **SHARE this note so that the co-signing nurse can place an eSignature**    Nurse 2 eSignature: Electronically signed by Hoda Song RN on 5/5/25 at 1:08 AM EDT    
CHLOÉ King was assisted to pull out IV from Left EJ that would not flush.   
Cvc removed from left groin without difficulty. One suture removed and pressure dressing applied.  Taped and secured to leg. Patient given extra gauge and tape to use incase of bleeding. Meds and bus pass given to patient.    1145 pt taken to lobby to catch bus.  
[facility] Hospitalist Group  Progress Note    Patient: Arnold Keys Age: 54 y.o. : 1970 MR#: [id number] SSN: [id number]  Date/Time: 2025     Subjective:   Review of systems  General: No fevers or chills.  Cardiovascular: No chest pain or pressure. No palpitations.   Pulmonary: No shortness of breath, cough or wheeze.   Gastrointestinal: No abdominal pain, nausea, vomiting or diarrhea.   Genitourinary: No urinary frequency, urgency, hesitancy or dysuria.   Musculoskeletal: No joint or muscle pain, no back pain, no recent trauma.    Neurologic: No headache, numbness, tingling or weakness.   Assessment/Plan:         Dispo plan: Home with home health care once medically stable, anticipated discharge date     #Elevated LAE (Hepatocellular pattern)  ISO admission where he was found outside, hypothermic, with LAE (AST 2000->3000->653/->578->350). Now consistently downtrending. Predominance of AST leans more so to heavy drinking which he admits to in his history. ALP WNL, leading to a hepatocellular pattern. ALB low at 3.3, but PLT WNL and INR 1.1. Bili WNL. Imaging findings of hepatic steatosis. Workup to date: Hep B neg, Hep A neg, HCV nonreactive. Ferritin elevated but iron sat 25%. Neg for PVT. Most likely etiology is binge drinking given rapid correction of lactic acidosis (combination w/ starvation ketoacidosis) upon rewarming and IV glucose support.    --GI following, appreciate assistance. CK ordered  --Pending ASMS, AMA.  --LA resolved.  --Trend hepatic enzymes, anticipate further downtrending  --Encourage alcohol cessation  --If continued down trending tomorrow and BCX NGTD would consider safe for discharge.     #Alcohol Abuse Disorder  --Mentions prior hx with active participation in AA.  --EtOH neg on admission  --CIWA to date w/o need for treatment at this time. If CIWA elevated would use symptom triggered ativan.  --Continue oral MVI, folic acid. Give oral 
[facility]   Pharmacy Pharmacokinetic Monitoring Service - Vancomycin    Indication: Sepsis of Unknown Etiology  Target Concentration: Goal AUC/ASHLEY 400-600 mg*hr/L  Day of Therapy: 2  Additional Antimicrobials: Cefepime    Pertinent Laboratory Values:   Temp: 99 °F (37.2 °C), Weight - Scale: 72.7 kg (160 lb 4.4 oz)  Recent Labs     05/04/25  0944 05/04/25  1300 05/04/25  1934 05/05/25  0450   CREATININE 0.98   < > 1.08 1.05   BUN 18   < > 18 15   WBC 7.9  --   --  8.7   PROCAL 0.04  --   --   --     < > = values in this interval not displayed.       Estimated Creatinine Clearance: 84 mL/min (based on SCr of 1.05 mg/dL).    Pertinent Cultures:  Culture Date Source Results   5/4 Blood  NGTD   5/4 Urine  NGF   MRSA Nasal Swab: N/A. Non-respiratory infection    Assessment:  Date/Time Current Dose Concentration Timing of Concentration (h) AUC   5/4  1500 mg x 1 - - -   5/5 1000 mg q12h - - -   Note: Serum concentrations collected for AUC dosing may appear elevated if collected in close proximity to the dose administered, this is not necessarily an indication of toxicity    Plan:  Continue current dose of 1000 mg q12h  Predicted steady state AUC/trough = 511/14.2  Renal labs as indicated   Level ordered for tomorrow AM with labs   Pharmacy will continue to monitor patient and adjust therapy as indicated    Thank you for the consult,  Pepper Menon RPH  5/5/2025  
[facility]   Pharmacy Pharmacokinetic Monitoring Service - Vancomycin    Indication: Sepsis of Unknown Etiology  Target Concentration: Goal AUC/ASHLEY 400-600 mg*hr/L  Day of Therapy: 1  Additional Antimicrobials: Cefepime    Pertinent Laboratory Values:   Temp: 99.3 °F (37.4 °C), Weight - Scale: 72.6 kg (160 lb)  Recent Labs     05/04/25  0944 05/04/25  1300 05/04/25  1934   CREATININE 0.98 0.85 1.08   BUN 18 16 18   WBC 7.9  --   --    PROCAL 0.04  --   --        Estimated Creatinine Clearance: 81 mL/min (based on SCr of 1.08 mg/dL).    Pertinent Cultures:  Culture Date Source Results   5/4 Blood  In process   5/4 Urine  In process   MRSA Nasal Swab: N/A. Non-respiratory infection    Assessment:  Date/Time Current Dose Concentration Timing of Concentration (h) AUC   5/4  1500 mg x1 - - -   Note: Serum concentrations collected for AUC dosing may appear elevated if collected in close proximity to the dose administered, this is not necessarily an indication of toxicity    Plan:  Dosing recommendations based on Bayesian software  Start vancomycin 1500 mg (20.7 mg/kg) once followed by 1000 mg Q12H  Predicted steady state AUC/trough = 525/14.8  Renal labs as indicated   Vancomycin levels as indicated. Not ordered at this time.   Pharmacy will continue to monitor patient and adjust therapy as indicated    Thank you for the consult,  Eun Chow RPH  5/4/2025    
Review of Systems    Physical Exam  Skin:         
Spiritual Health History and Assessment/Progress Note  [facility]    Spiritual/Emotional Needs,  ,  ,      Name: Arnold Keys MRN: [id number]    Age: 54 y.o.     Sex: male   Language: English   Nondenominational: [employer]   Hypothermia, initial encounter     Date: 5/6/2025            Total Time Calculated: 7 min              Spiritual Assessment began in [facility] MEDICAL        Referral/Consult From: Multi-disciplinary team   Encounter Overview/Reason: Spiritual/Emotional Needs  Service Provided For: Patient    Cha, Belief, Meaning:   Patient has beliefs or practices that help with coping during difficult times  Family/Friends No family/friends present      Importance and Influence:  Patient has spiritual/personal beliefs that influence decisions regarding their health  Family/Friends No family/friends present    Community:  Patient feels well-supported. Support system includes: Extended family  Family/Friends No family/friends present    Assessment and Plan of Care:     Patient Interventions include: Affirmed coping skills/support systems  Family/Friends Interventions include: No family/friends present    Patient Plan of Care: No spiritual needs identified for follow-up  Family/Friends Plan of Care: No family/friends present    Electronically signed by NEO Haines on 5/6/2025 at 12:52 PM    
TRANSFER - IN REPORT:    Verbal report received from zac mi on Arnold WORTHY Vinita  being received from [address] [place] [address] for routine progression of patient care      Report consisted of patient's Situation, Background, Assessment and   Recommendations(SBAR).     Information from the following report(s) MAR and Recent Results was reviewed with the receiving nurse.    Opportunity for questions and clarification was provided.      Assessment completed upon patient's arrival to unit and care assumed.     Patient transferred with left groin line placed 5/4/2025      
TRANSFER - OUT REPORT:    Verbal report given to CHLOÉ Glaser on Arnold Keys  being transferred to  for routine progression of patient care       Report consisted of patient's Situation, Background, Assessment and   Recommendations(SBAR).     Information from the following report(s) Nurse Handoff Report, Adult Overview, Intake/Output, MAR, Recent Results, Med Rec Status, Cardiac Rhythm Sinus Minh, Alarm Parameters, and Neuro Assessment was reviewed with the receiving nurse.           Lines:   CVC  05/04/25 Left Femoral (Active)   Central Line Being Utilized Yes 05/05/25 2000   Criteria for Appropriate Use Limited/no vessel suitable for conventional peripheral access 05/05/25 2000   Site Assessment Clean, dry & intact 05/05/25 2000   Phlebitis Assessment No symptoms 05/05/25 2000   Infiltration Assessment 0 05/05/25 2000   Color/Movement/Sensation Capillary refill less than 3 sec 05/05/25 2000   Line Care Connections checked and tightened;Ports disinfected 05/05/25 2000   Alcohol Cap Used Yes 05/05/25 2000   Date of Last Dressing Change 05/04/25 05/05/25 2000   Dressing Type Transparent w/CHG gel 05/05/25 2000   Dressing Status Clean, dry & intact 05/05/25 2000   Dressing Intervention Dressing changed 05/04/25 9865        Opportunity for questions and clarification was provided.      Patient transported with:  Tech       
To dialysis  via bed. Tele and avesure made aware of pt going to dialysis  
Care  [email or web address]  Phone: [phone]

## 2025-05-06 NOTE — CARE COORDINATION
Discharge order noted for today.     CM received update that the patient is requesting shelter information.    CM spoke with patient at bedside, per patient he lives with his sister and her  and she currently has pneumonia and he doesn't want to get infected so he would rather to go to shelter until she gets better.    CM offered a list of shelters to the patient but he declined and said he has a shelter that he has gone to before he just needs a bus pass at discharge.     CM to provide bus pass to patient, no other needs identified at this time.

## 2025-05-06 NOTE — PLAN OF CARE
Problem: Pain  Goal: Verbalizes/displays adequate comfort level or baseline comfort level  5/6/2025 0328 by Olinda Kaur, RN  Outcome: Progressing  Flowsheets (Taken 5/6/2025 0328)  Verbalizes/displays adequate comfort level or baseline comfort level:   Encourage patient to monitor pain and request assistance   Administer analgesics based on type and severity of pain and evaluate response   Assess pain using appropriate pain scale   Implement non-pharmacological measures as appropriate and evaluate response  Note: Patient will remain free of pain for entire shift.     Problem: Safety - Adult  Goal: Free from fall injury  5/6/2025 0328 by Olinda Kaur, RN  Outcome: Progressing  Flowsheets (Taken 5/6/2025 0328)  Free From Fall Injury: Instruct family/caregiver on patient safety  Note: Bed alarm set and non-slip socks on.

## 2025-05-07 LAB
MITOCHONDRIA M2 IGG SER-ACNC: <20 UNITS (ref 0–20)
SMA IGG SER-ACNC: 8 UNITS (ref 0–19)

## 2025-07-14 ENCOUNTER — APPOINTMENT (OUTPATIENT)
Facility: HOSPITAL | Age: 55
DRG: 137 | End: 2025-07-14
Payer: MEDICAID

## 2025-07-14 ENCOUNTER — HOSPITAL ENCOUNTER (INPATIENT)
Facility: HOSPITAL | Age: 55
LOS: 3 days | Discharge: HOME | DRG: 137 | End: 2025-07-17
Attending: EMERGENCY MEDICINE | Admitting: STUDENT IN AN ORGANIZED HEALTH CARE EDUCATION/TRAINING PROGRAM
Payer: MEDICAID

## 2025-07-14 DIAGNOSIS — T68.XXXA HYPOTHERMIA, INITIAL ENCOUNTER: ICD-10-CM

## 2025-07-14 DIAGNOSIS — J18.9 MULTIFOCAL PNEUMONIA: ICD-10-CM

## 2025-07-14 DIAGNOSIS — E16.2 HYPOGLYCEMIA: Primary | ICD-10-CM

## 2025-07-14 DIAGNOSIS — R55 SYNCOPE AND COLLAPSE: ICD-10-CM

## 2025-07-14 DIAGNOSIS — E87.6 HYPOKALEMIA: ICD-10-CM

## 2025-07-14 LAB
ALBUMIN SERPL-MCNC: 3.2 G/DL (ref 3.4–5)
ALBUMIN SERPL-MCNC: 3.4 G/DL (ref 3.4–5)
ALBUMIN/GLOB SERPL: 0.8 (ref 0.8–1.7)
ALBUMIN/GLOB SERPL: 0.8 (ref 0.8–1.7)
ALP SERPL-CCNC: 82 U/L (ref 45–117)
ALP SERPL-CCNC: 85 U/L (ref 45–117)
ALT SERPL-CCNC: 15 U/L (ref 10–50)
ALT SERPL-CCNC: 23 U/L (ref 10–50)
ANION GAP BLD CALC-SCNC: ABNORMAL MMOL/L (ref 10–20)
ANION GAP SERPL CALC-SCNC: 13 MMOL/L (ref 3–18)
ANION GAP SERPL CALC-SCNC: 20 MMOL/L (ref 3–18)
APPEARANCE UR: CLEAR
AST SERPL-CCNC: 24 U/L (ref 10–38)
AST SERPL-CCNC: 46 U/L (ref 10–38)
BACTERIA URNS QL MICRO: NEGATIVE /HPF
BASE DEFICIT BLD-SCNC: 4.5 MMOL/L
BASOPHILS # BLD: 0.03 K/UL (ref 0–0.1)
BASOPHILS NFR BLD: 0.2 % (ref 0–2)
BILIRUB SERPL-MCNC: 0.4 MG/DL (ref 0.2–1)
BILIRUB SERPL-MCNC: 0.6 MG/DL (ref 0.2–1)
BILIRUB UR QL: NEGATIVE
BUN SERPL-MCNC: 12 MG/DL (ref 6–23)
BUN SERPL-MCNC: 17 MG/DL (ref 6–23)
BUN/CREAT SERPL: 14 (ref 12–20)
BUN/CREAT SERPL: 16 (ref 12–20)
CA-I BLD-MCNC: 1.16 MMOL/L (ref 1.15–1.33)
CALCIUM SERPL-MCNC: 9 MG/DL (ref 8.5–10.1)
CALCIUM SERPL-MCNC: 9.7 MG/DL (ref 8.5–10.1)
CHLORIDE BLD-SCNC: 109 MMOL/L (ref 98–107)
CHLORIDE SERPL-SCNC: 103 MMOL/L (ref 98–107)
CHLORIDE SERPL-SCNC: 105 MMOL/L (ref 98–107)
CO2 BLD-SCNC: 21 MMOL/L (ref 22–29)
CO2 SERPL-SCNC: 19 MMOL/L (ref 21–32)
CO2 SERPL-SCNC: 21 MMOL/L (ref 21–32)
COLOR UR: YELLOW
CREAT BLD-MCNC: 0.94 MG/DL (ref 0.6–1.3)
CREAT SERPL-MCNC: 0.85 MG/DL (ref 0.6–1.3)
CREAT SERPL-MCNC: 1.02 MG/DL (ref 0.6–1.3)
DIFFERENTIAL METHOD BLD: ABNORMAL
EKG ATRIAL RATE: 39 BPM
EKG DIAGNOSIS: NORMAL
EKG P AXIS: 68 DEGREES
EKG P-R INTERVAL: 148 MS
EKG Q-T INTERVAL: 538 MS
EKG QRS DURATION: 108 MS
EKG QTC CALCULATION (BAZETT): 433 MS
EKG R AXIS: 74 DEGREES
EKG T AXIS: 45 DEGREES
EKG VENTRICULAR RATE: 39 BPM
EOSINOPHIL # BLD: 0.3 K/UL (ref 0–0.4)
EOSINOPHIL NFR BLD: 2.4 % (ref 0–5)
EPITH CASTS URNS QL MICRO: ABNORMAL /LPF (ref 0–5)
ERYTHROCYTE [DISTWIDTH] IN BLOOD BY AUTOMATED COUNT: 12.9 % (ref 11.6–14.5)
ETHANOL SERPL-MCNC: 93 MG/DL (ref 0–0.08)
GLOBULIN SER CALC-MCNC: 3.9 G/DL (ref 2–4)
GLOBULIN SER CALC-MCNC: 4 G/DL (ref 2–4)
GLUCOSE BLD STRIP.AUTO-MCNC: 105 MG/DL (ref 70–110)
GLUCOSE BLD STRIP.AUTO-MCNC: 107 MG/DL (ref 70–110)
GLUCOSE BLD STRIP.AUTO-MCNC: 111 MG/DL (ref 70–110)
GLUCOSE BLD STRIP.AUTO-MCNC: 161 MG/DL (ref 70–110)
GLUCOSE BLD STRIP.AUTO-MCNC: 48 MG/DL (ref 70–110)
GLUCOSE BLD STRIP.AUTO-MCNC: 77 MG/DL (ref 70–110)
GLUCOSE BLD-MCNC: 42 MG/DL (ref 74–99)
GLUCOSE SERPL-MCNC: 45 MG/DL (ref 74–108)
GLUCOSE SERPL-MCNC: 92 MG/DL (ref 74–108)
GLUCOSE UR STRIP.AUTO-MCNC: NEGATIVE MG/DL
HCO3 BLD-SCNC: 21.6 MMOL/L (ref 21–28)
HCT VFR BLD AUTO: 33.5 % (ref 36–48)
HGB BLD-MCNC: 10.9 G/DL (ref 13–16)
HGB UR QL STRIP: ABNORMAL
IMM GRANULOCYTES # BLD AUTO: 0.03 K/UL (ref 0–0.04)
IMM GRANULOCYTES NFR BLD AUTO: 0.2 % (ref 0–0.5)
KETONES UR QL STRIP.AUTO: 40 MG/DL
LACTATE BLD-SCNC: 1.18 MMOL/L (ref 0.4–2)
LACTATE BLD-SCNC: 3.65 MMOL/L (ref 0.4–2)
LACTATE BLD-SCNC: 4.15 MMOL/L (ref 0.4–2)
LACTATE SERPL-SCNC: 1.4 MMOL/L (ref 0.4–2)
LEUKOCYTE ESTERASE UR QL STRIP.AUTO: NEGATIVE
LIPASE SERPL-CCNC: 25 U/L (ref 13–75)
LYMPHOCYTES # BLD: 2.7 K/UL (ref 0.9–3.6)
LYMPHOCYTES NFR BLD: 21.8 % (ref 21–52)
MAGNESIUM SERPL-MCNC: 1.9 MG/DL (ref 1.6–2.6)
MCH RBC QN AUTO: 33.2 PG (ref 24–34)
MCHC RBC AUTO-ENTMCNC: 32.5 G/DL (ref 31–37)
MCV RBC AUTO: 102.1 FL (ref 78–100)
MONOCYTES # BLD: 0.73 K/UL (ref 0.05–1.2)
MONOCYTES NFR BLD: 5.9 % (ref 3–10)
MUCOUS THREADS URNS QL MICRO: ABNORMAL /LPF
NEUTS SEG # BLD: 8.62 K/UL (ref 1.8–8)
NEUTS SEG NFR BLD: 69.5 % (ref 40–73)
NITRITE UR QL STRIP.AUTO: NEGATIVE
NRBC # BLD: 0 K/UL (ref 0–0.01)
NRBC BLD-RTO: 0 PER 100 WBC
PCO2 BLDV: 42.4 MMHG (ref 41–51)
PH BLDV: 7.31 (ref 7.32–7.42)
PH UR STRIP: 5.5 (ref 5–8)
PLATELET # BLD AUTO: 246 K/UL (ref 135–420)
PMV BLD AUTO: 10.7 FL (ref 9.2–11.8)
PO2 BLDV: 47 MMHG (ref 25–40)
POTASSIUM BLD-SCNC: 2.8 MMOL/L (ref 3.5–5.1)
POTASSIUM SERPL-SCNC: 2.9 MMOL/L (ref 3.5–5.5)
POTASSIUM SERPL-SCNC: 4.7 MMOL/L (ref 3.5–5.5)
PROT SERPL-MCNC: 7.1 G/DL (ref 6.4–8.2)
PROT SERPL-MCNC: 7.4 G/DL (ref 6.4–8.2)
PROT UR STRIP-MCNC: 30 MG/DL
RBC # BLD AUTO: 3.28 M/UL (ref 4.35–5.65)
RBC #/AREA URNS HPF: NEGATIVE /HPF (ref 0–5)
SAO2 % BLD: 78 % (ref 94–98)
SERVICE CMNT-IMP: ABNORMAL
SERVICE CMNT-IMP: ABNORMAL
SODIUM BLD-SCNC: 145 MMOL/L (ref 136–145)
SODIUM SERPL-SCNC: 137 MMOL/L (ref 136–145)
SODIUM SERPL-SCNC: 145 MMOL/L (ref 136–145)
SP GR UR REFRACTOMETRY: 1.02 (ref 1–1.03)
SPECIMEN SITE: ABNORMAL
TROPONIN T SERPL HS-MCNC: 15.5 NG/L (ref 0–22)
UROBILINOGEN UR QL STRIP.AUTO: 1 EU/DL (ref 0.2–1)
WBC # BLD AUTO: 12.4 K/UL (ref 4.6–13.2)
WBC URNS QL MICRO: NEGATIVE /HPF (ref 0–5)

## 2025-07-14 PROCEDURE — 6360000002 HC RX W HCPCS: Performed by: STUDENT IN AN ORGANIZED HEALTH CARE EDUCATION/TRAINING PROGRAM

## 2025-07-14 PROCEDURE — 6370000000 HC RX 637 (ALT 250 FOR IP): Performed by: EMERGENCY MEDICINE

## 2025-07-14 PROCEDURE — 93005 ELECTROCARDIOGRAM TRACING: CPT | Performed by: EMERGENCY MEDICINE

## 2025-07-14 PROCEDURE — 82962 GLUCOSE BLOOD TEST: CPT

## 2025-07-14 PROCEDURE — 99222 1ST HOSP IP/OBS MODERATE 55: CPT | Performed by: STUDENT IN AN ORGANIZED HEALTH CARE EDUCATION/TRAINING PROGRAM

## 2025-07-14 PROCEDURE — 93010 ELECTROCARDIOGRAM REPORT: CPT | Performed by: INTERNAL MEDICINE

## 2025-07-14 PROCEDURE — 83690 ASSAY OF LIPASE: CPT

## 2025-07-14 PROCEDURE — 83605 ASSAY OF LACTIC ACID: CPT

## 2025-07-14 PROCEDURE — 94761 N-INVAS EAR/PLS OXIMETRY MLT: CPT

## 2025-07-14 PROCEDURE — 6370000000 HC RX 637 (ALT 250 FOR IP): Performed by: STUDENT IN AN ORGANIZED HEALTH CARE EDUCATION/TRAINING PROGRAM

## 2025-07-14 PROCEDURE — 6360000002 HC RX W HCPCS: Performed by: EMERGENCY MEDICINE

## 2025-07-14 PROCEDURE — 84295 ASSAY OF SERUM SODIUM: CPT

## 2025-07-14 PROCEDURE — 71045 X-RAY EXAM CHEST 1 VIEW: CPT

## 2025-07-14 PROCEDURE — 96365 THER/PROPH/DIAG IV INF INIT: CPT

## 2025-07-14 PROCEDURE — 2580000003 HC RX 258: Performed by: STUDENT IN AN ORGANIZED HEALTH CARE EDUCATION/TRAINING PROGRAM

## 2025-07-14 PROCEDURE — 74177 CT ABD & PELVIS W/CONTRAST: CPT

## 2025-07-14 PROCEDURE — 87040 BLOOD CULTURE FOR BACTERIA: CPT

## 2025-07-14 PROCEDURE — 96361 HYDRATE IV INFUSION ADD-ON: CPT

## 2025-07-14 PROCEDURE — 82077 ASSAY SPEC XCP UR&BREATH IA: CPT

## 2025-07-14 PROCEDURE — 2580000003 HC RX 258: Performed by: EMERGENCY MEDICINE

## 2025-07-14 PROCEDURE — 96366 THER/PROPH/DIAG IV INF ADDON: CPT

## 2025-07-14 PROCEDURE — 82803 BLOOD GASES ANY COMBINATION: CPT

## 2025-07-14 PROCEDURE — 36415 COLL VENOUS BLD VENIPUNCTURE: CPT

## 2025-07-14 PROCEDURE — 99285 EMERGENCY DEPT VISIT HI MDM: CPT

## 2025-07-14 PROCEDURE — 71275 CT ANGIOGRAPHY CHEST: CPT

## 2025-07-14 PROCEDURE — 84484 ASSAY OF TROPONIN QUANT: CPT

## 2025-07-14 PROCEDURE — 82947 ASSAY GLUCOSE BLOOD QUANT: CPT

## 2025-07-14 PROCEDURE — 80053 COMPREHEN METABOLIC PANEL: CPT

## 2025-07-14 PROCEDURE — 87086 URINE CULTURE/COLONY COUNT: CPT

## 2025-07-14 PROCEDURE — 84132 ASSAY OF SERUM POTASSIUM: CPT

## 2025-07-14 PROCEDURE — 85025 COMPLETE CBC W/AUTO DIFF WBC: CPT

## 2025-07-14 PROCEDURE — 1100000003 HC PRIVATE W/ TELEMETRY

## 2025-07-14 PROCEDURE — 6360000004 HC RX CONTRAST MEDICATION: Performed by: EMERGENCY MEDICINE

## 2025-07-14 PROCEDURE — 83735 ASSAY OF MAGNESIUM: CPT

## 2025-07-14 PROCEDURE — 81001 URINALYSIS AUTO W/SCOPE: CPT

## 2025-07-14 PROCEDURE — 2500000003 HC RX 250 WO HCPCS: Performed by: STUDENT IN AN ORGANIZED HEALTH CARE EDUCATION/TRAINING PROGRAM

## 2025-07-14 PROCEDURE — 70450 CT HEAD/BRAIN W/O DYE: CPT

## 2025-07-14 PROCEDURE — 2500000003 HC RX 250 WO HCPCS: Performed by: EMERGENCY MEDICINE

## 2025-07-14 PROCEDURE — 82330 ASSAY OF CALCIUM: CPT

## 2025-07-14 PROCEDURE — 96375 TX/PRO/DX INJ NEW DRUG ADDON: CPT

## 2025-07-14 PROCEDURE — 85014 HEMATOCRIT: CPT

## 2025-07-14 RX ORDER — METRONIDAZOLE 500 MG/100ML
500 INJECTION, SOLUTION INTRAVENOUS EVERY 8 HOURS
Status: DISCONTINUED | OUTPATIENT
Start: 2025-07-14 | End: 2025-07-15 | Stop reason: ALTCHOICE

## 2025-07-14 RX ORDER — SODIUM CHLORIDE 0.9 % (FLUSH) 0.9 %
5-40 SYRINGE (ML) INJECTION EVERY 12 HOURS SCHEDULED
Status: DISCONTINUED | OUTPATIENT
Start: 2025-07-14 | End: 2025-07-17 | Stop reason: HOSPADM

## 2025-07-14 RX ORDER — IOPAMIDOL 755 MG/ML
100 INJECTION, SOLUTION INTRAVASCULAR
Status: COMPLETED | OUTPATIENT
Start: 2025-07-14 | End: 2025-07-14

## 2025-07-14 RX ORDER — LORAZEPAM 1 MG/1
2 TABLET ORAL
Status: DISCONTINUED | OUTPATIENT
Start: 2025-07-14 | End: 2025-07-16

## 2025-07-14 RX ORDER — POTASSIUM CHLORIDE 7.45 MG/ML
10 INJECTION INTRAVENOUS
Status: DISPENSED | OUTPATIENT
Start: 2025-07-14 | End: 2025-07-14

## 2025-07-14 RX ORDER — POTASSIUM CHLORIDE 1500 MG/1
40 TABLET, EXTENDED RELEASE ORAL PRN
Status: DISCONTINUED | OUTPATIENT
Start: 2025-07-14 | End: 2025-07-17 | Stop reason: HOSPADM

## 2025-07-14 RX ORDER — POLYETHYLENE GLYCOL 3350 17 G/17G
17 POWDER, FOR SOLUTION ORAL DAILY PRN
Status: DISCONTINUED | OUTPATIENT
Start: 2025-07-14 | End: 2025-07-17 | Stop reason: HOSPADM

## 2025-07-14 RX ORDER — 0.9 % SODIUM CHLORIDE 0.9 %
1000 INTRAVENOUS SOLUTION INTRAVENOUS ONCE
Status: COMPLETED | OUTPATIENT
Start: 2025-07-14 | End: 2025-07-14

## 2025-07-14 RX ORDER — SODIUM CHLORIDE 0.9 % (FLUSH) 0.9 %
5-40 SYRINGE (ML) INJECTION PRN
Status: DISCONTINUED | OUTPATIENT
Start: 2025-07-14 | End: 2025-07-17 | Stop reason: HOSPADM

## 2025-07-14 RX ORDER — FOLIC ACID 1 MG/1
1 TABLET ORAL DAILY
Status: DISCONTINUED | OUTPATIENT
Start: 2025-07-14 | End: 2025-07-17 | Stop reason: HOSPADM

## 2025-07-14 RX ORDER — LORAZEPAM 2 MG/ML
2 INJECTION INTRAMUSCULAR
Status: DISCONTINUED | OUTPATIENT
Start: 2025-07-14 | End: 2025-07-16

## 2025-07-14 RX ORDER — MAGNESIUM SULFATE IN WATER 40 MG/ML
2000 INJECTION, SOLUTION INTRAVENOUS PRN
Status: DISCONTINUED | OUTPATIENT
Start: 2025-07-14 | End: 2025-07-17 | Stop reason: HOSPADM

## 2025-07-14 RX ORDER — MULTIVITAMIN WITH IRON
1 TABLET ORAL DAILY
Status: DISCONTINUED | OUTPATIENT
Start: 2025-07-14 | End: 2025-07-17 | Stop reason: HOSPADM

## 2025-07-14 RX ORDER — ACETAMINOPHEN 650 MG/1
650 SUPPOSITORY RECTAL EVERY 6 HOURS PRN
Status: DISCONTINUED | OUTPATIENT
Start: 2025-07-14 | End: 2025-07-17 | Stop reason: HOSPADM

## 2025-07-14 RX ORDER — LORAZEPAM 1 MG/1
1 TABLET ORAL
Status: DISCONTINUED | OUTPATIENT
Start: 2025-07-14 | End: 2025-07-16

## 2025-07-14 RX ORDER — LORAZEPAM 1 MG/1
3 TABLET ORAL
Status: DISCONTINUED | OUTPATIENT
Start: 2025-07-14 | End: 2025-07-16

## 2025-07-14 RX ORDER — LORAZEPAM 1 MG/1
4 TABLET ORAL
Status: DISCONTINUED | OUTPATIENT
Start: 2025-07-14 | End: 2025-07-16

## 2025-07-14 RX ORDER — POTASSIUM CHLORIDE 7.45 MG/ML
10 INJECTION INTRAVENOUS PRN
Status: DISCONTINUED | OUTPATIENT
Start: 2025-07-14 | End: 2025-07-17 | Stop reason: HOSPADM

## 2025-07-14 RX ORDER — SODIUM CHLORIDE 9 MG/ML
INJECTION, SOLUTION INTRAVENOUS PRN
Status: DISCONTINUED | OUTPATIENT
Start: 2025-07-14 | End: 2025-07-17 | Stop reason: HOSPADM

## 2025-07-14 RX ORDER — ONDANSETRON 2 MG/ML
4 INJECTION INTRAMUSCULAR; INTRAVENOUS EVERY 6 HOURS PRN
Status: DISCONTINUED | OUTPATIENT
Start: 2025-07-14 | End: 2025-07-17 | Stop reason: HOSPADM

## 2025-07-14 RX ORDER — ONDANSETRON 4 MG/1
4 TABLET, ORALLY DISINTEGRATING ORAL EVERY 8 HOURS PRN
Status: DISCONTINUED | OUTPATIENT
Start: 2025-07-14 | End: 2025-07-17 | Stop reason: HOSPADM

## 2025-07-14 RX ORDER — ACETAMINOPHEN 325 MG/1
650 TABLET ORAL EVERY 6 HOURS PRN
Status: DISCONTINUED | OUTPATIENT
Start: 2025-07-14 | End: 2025-07-17 | Stop reason: HOSPADM

## 2025-07-14 RX ORDER — LORAZEPAM 2 MG/ML
1 INJECTION INTRAMUSCULAR
Status: DISCONTINUED | OUTPATIENT
Start: 2025-07-14 | End: 2025-07-16

## 2025-07-14 RX ORDER — LORAZEPAM 2 MG/ML
4 INJECTION INTRAMUSCULAR
Status: DISCONTINUED | OUTPATIENT
Start: 2025-07-14 | End: 2025-07-16

## 2025-07-14 RX ORDER — VANCOMYCIN 1.5 G/300ML
1500 INJECTION, SOLUTION INTRAVENOUS ONCE
Status: COMPLETED | OUTPATIENT
Start: 2025-07-14 | End: 2025-07-14

## 2025-07-14 RX ORDER — HYDRALAZINE HYDROCHLORIDE 25 MG/1
25 TABLET, FILM COATED ORAL EVERY 8 HOURS PRN
Status: DISCONTINUED | OUTPATIENT
Start: 2025-07-14 | End: 2025-07-17 | Stop reason: HOSPADM

## 2025-07-14 RX ORDER — POTASSIUM CHLORIDE 1500 MG/1
60 TABLET, EXTENDED RELEASE ORAL
Status: COMPLETED | OUTPATIENT
Start: 2025-07-14 | End: 2025-07-14

## 2025-07-14 RX ORDER — SODIUM CHLORIDE AND POTASSIUM CHLORIDE 300; 900 MG/100ML; MG/100ML
INJECTION, SOLUTION INTRAVENOUS CONTINUOUS
Status: DISCONTINUED | OUTPATIENT
Start: 2025-07-14 | End: 2025-07-14

## 2025-07-14 RX ORDER — LORAZEPAM 2 MG/ML
3 INJECTION INTRAMUSCULAR
Status: DISCONTINUED | OUTPATIENT
Start: 2025-07-14 | End: 2025-07-16

## 2025-07-14 RX ORDER — POTASSIUM CHLORIDE 7.45 MG/ML
10 INJECTION INTRAVENOUS ONCE
Status: COMPLETED | OUTPATIENT
Start: 2025-07-14 | End: 2025-07-14

## 2025-07-14 RX ORDER — ENOXAPARIN SODIUM 100 MG/ML
40 INJECTION SUBCUTANEOUS DAILY
Status: DISCONTINUED | OUTPATIENT
Start: 2025-07-14 | End: 2025-07-17 | Stop reason: HOSPADM

## 2025-07-14 RX ORDER — GAUZE BANDAGE 2" X 2"
100 BANDAGE TOPICAL DAILY
Status: DISCONTINUED | OUTPATIENT
Start: 2025-07-14 | End: 2025-07-17 | Stop reason: HOSPADM

## 2025-07-14 RX ADMIN — SODIUM CHLORIDE 1000 ML: 0.9 INJECTION, SOLUTION INTRAVENOUS at 08:54

## 2025-07-14 RX ADMIN — METRONIDAZOLE 500 MG: 500 INJECTION, SOLUTION INTRAVENOUS at 22:15

## 2025-07-14 RX ADMIN — IOPAMIDOL 100 ML: 755 INJECTION, SOLUTION INTRAVENOUS at 10:46

## 2025-07-14 RX ADMIN — SODIUM CHLORIDE, PRESERVATIVE FREE 10 ML: 5 INJECTION INTRAVENOUS at 22:12

## 2025-07-14 RX ADMIN — CEFEPIME 2000 MG: 2 INJECTION, POWDER, FOR SOLUTION INTRAVENOUS at 08:18

## 2025-07-14 RX ADMIN — POTASSIUM BICARBONATE 40 MEQ: 782 TABLET, EFFERVESCENT ORAL at 09:28

## 2025-07-14 RX ADMIN — PIPERACILLIN AND TAZOBACTAM 3375 MG: 3; .375 INJECTION, POWDER, FOR SOLUTION INTRAVENOUS; PARENTERAL at 22:21

## 2025-07-14 RX ADMIN — DEXTROSE 250 ML: 10 SOLUTION INTRAVENOUS at 06:02

## 2025-07-14 RX ADMIN — SODIUM CHLORIDE, PRESERVATIVE FREE 10 ML: 5 INJECTION INTRAVENOUS at 22:15

## 2025-07-14 RX ADMIN — Medication 100 MG: at 15:51

## 2025-07-14 RX ADMIN — THERA TABS 1 TABLET: TAB at 15:51

## 2025-07-14 RX ADMIN — PIPERACILLIN AND TAZOBACTAM 4500 MG: 4; .5 INJECTION, POWDER, FOR SOLUTION INTRAVENOUS at 16:10

## 2025-07-14 RX ADMIN — FOLIC ACID 1 MG: 1 TABLET ORAL at 15:51

## 2025-07-14 RX ADMIN — POTASSIUM CHLORIDE 10 MEQ: 7.46 INJECTION, SOLUTION INTRAVENOUS at 12:20

## 2025-07-14 RX ADMIN — POTASSIUM CHLORIDE 60 MEQ: 1500 TABLET, EXTENDED RELEASE ORAL at 06:07

## 2025-07-14 RX ADMIN — POTASSIUM CHLORIDE 10 MEQ: 7.46 INJECTION, SOLUTION INTRAVENOUS at 08:28

## 2025-07-14 RX ADMIN — METRONIDAZOLE 500 MG: 500 INJECTION, SOLUTION INTRAVENOUS at 12:26

## 2025-07-14 RX ADMIN — VANCOMYCIN 1500 MG: 1.5 INJECTION, SOLUTION INTRAVENOUS at 09:16

## 2025-07-14 RX ADMIN — POTASSIUM BICARBONATE 40 MEQ: 782 TABLET, EFFERVESCENT ORAL at 08:19

## 2025-07-14 RX ADMIN — SODIUM CHLORIDE 1000 ML: 0.9 INJECTION, SOLUTION INTRAVENOUS at 06:42

## 2025-07-14 RX ADMIN — Medication 16 G: at 05:01

## 2025-07-14 ASSESSMENT — PAIN SCALES - GENERAL: PAINLEVEL_OUTOF10: 0

## 2025-07-14 ASSESSMENT — LIFESTYLE VARIABLES
HOW MANY STANDARD DRINKS CONTAINING ALCOHOL DO YOU HAVE ON A TYPICAL DAY: 3 OR 4
HOW OFTEN DO YOU HAVE A DRINK CONTAINING ALCOHOL: 4 OR MORE TIMES A WEEK

## 2025-07-14 NOTE — ED NOTES
Patient assessed. He is alert and well oriented to time, place and person. Temp was 35.3 (rectal). Temperature monitoring continued via rectal probe. All prescribed medications initiated. IVF administration contd per MAR.     Patient complained of burning sensation in the peripheral line after the initiation of the KCL via the perfuser. KCL reconnected with saline. However, patient continued to experience the burning sensation. Discussed with CHLOÉ Kapoor, the Charge Nurse and Dr Acuna. Dr Acuna advised to change the rate of administration to 50ml/hr. At 0908H, the rate of administration changed. Patient lodged no complaint.

## 2025-07-14 NOTE — ED NOTES
TRANSFER - OUT REPORT:    Verbal report given to HCLOÉ Valera on Arnold Keys  being transferred to 4N for routine progression of patient care       Report consisted of patient's Situation, Background, Assessment and   Recommendations(SBAR).     Information from the following report(s) ED Encounter Summary, ED SBAR, MAR, Recent Results, and Neuro Assessment was reviewed with the receiving nurse.    [place] Fall Assessment:    Presents to emergency department  because of falls (Syncope, seizure, or loss of consciousness): No  Age > 70: No  Altered Mental Status, Intoxication with alcohol or substance confusion (Disorientation, impaired judgment, poor safety awaremess, or inability to follow instructions): No  Impaired Mobility: Ambulates or transfers with assistive devices or assistance; Unable to ambulate or transer.: Yes  Nursing Judgement: Yes          Lines:   Peripheral IV 07/14/25 Left;Anterior Forearm (Active)       Peripheral IV 07/14/25 Distal;Left;Anterior Forearm (Active)        Opportunity for questions and clarification was provided.      Patient transported with:  Tech

## 2025-07-14 NOTE — ED NOTES
Temp.97.9, Dr. Acuna made aware.  Gave verbal order to discontinue reina carolina.    Pt remains A&Ox4.  Ambulated to the restroom without difficulty.

## 2025-07-14 NOTE — ED NOTES
Pt BS 44: oral glucose given. Pt found laying on sidewalk. Pt stated he felt weak and couldn't get up. Pt was alert and oriented for the medics. Vitals stable. Pt didn't fall or hit his head nor lost consciousness.

## 2025-07-14 NOTE — ED PROVIDER NOTES
55-year-old male presents to the hospital with feeling weak.  Patient was hypothermic and bradycardic upon initial evaluation.  Patient was given a bear hugger.  Also did a sepsis workup once patient's lactic acid came back over 3.  Broad-spectrum antibiotics were started.    Patient has been complaining of multiple syncopal events that has not been worked up by a physician in the past.      PE: cold to touch, abd benign exam, nontender, coarse breath sounds on pulm exam  Decision to admit made by me.  Patient accepted for admission to general medical floor by Dr. Perry, hospitalist      ED Course as of 07/14/25 1227   Mon Jul 14, 2025   0524 Reviewed POC labs, no acidosis or blood gas abnormality, potassium is low at 2.8, lactate elevated close to 4.  Hypoglycemic [CB]   0543 My interpretation twelve-lead EKG sinus bradycardia at 39 with a narrow complex no acute process otherwise [CB]   0605 56 y/o m homeless, feeling weak   Gluc 48 s/p gluc  Hypothermic to 93    - pending labs    [NF]   1007 Cxr reviewed by me.    . Minimally worsened aeration of right upper lobe with development of right  upper lobe airspace opacity. Minimally worsened sequela of mild to moderate  congestion. Follow-up with plain imaging of the chest.. .   [NF]   1115 Ct head reviewed by me    1. No acute intracranial hemorrhage, mass effect or midline structural shift. No  significant interval change.   [NF]   1201 Ct ap reviewed by me      Ileus with multiple loops of moderately distended small bowel in the left  abdomen with minimal mucosal thickening throughout the left colon extending into  the rectosigmoid region. Asymmetric mucosal thickening in the left rectosigmoid  region bowel wall. Limited evaluation given presence of fecal material.  Inflammatory colitis is suggested. Once inflammation subsides follow-up with  colonoscopy is suggested. Rectosigmoid lesion cannot be fully excluded..     2.   Nonobstructing left lower renal pole

## 2025-07-14 NOTE — ED PROVIDER NOTES
EMERGENCY DEPARTMENT HISTORY AND PHYSICAL EXAM    4:47 AM EDT seen at this time in room 17        Date: 7/14/2025  Patient Name: Arnold Keys    History of Presenting Illness     No chief complaint on file.        History Provided By: patient    Additional History (Context): Arnold Keys is a 55 y.o. male presents with brought in by EMS, was outside on a public road.  He is homeless.  He started feeling very weak and sweaty.  Found to be hypoglycemic at 44 and treated with oral glucose..  Denies pain fever shortness of breath.  Acknowledges drinking alcohol tonight    PCP: Unknown, Provider    Chief Complaint:   Duration:    Timing:    Location:   Quality:   Severity:   Modifying Factors:   Associated Symptoms:       No current facility-administered medications for this encounter.     Current Outpatient Medications   Medication Sig Dispense Refill   • Multiple Vitamin (MULTIVITAMIN) TABS tablet Take 1 tablet by mouth daily 30 tablet 0   • folic acid (FOLVITE) 1 MG tablet Take 1 tablet by mouth daily 30 tablet 0   • amLODIPine (NORVASC) 10 MG tablet Take 1 tablet by mouth nightly 30 tablet 0   • vitamin B-1 (THIAMINE) 100 MG tablet Take 1 tablet by mouth daily 30 tablet 0   • ibuprofen (IBU) 600 MG tablet Take 1 tablet by mouth every 6 hours as needed for Pain 30 tablet 0   • glucose monitoring kit 1 kit by Does not apply route daily 1 kit 0   • Lancets MISC 1 each by Does not apply route daily 100 each 0   • blood glucose monitor strips Test 1 time a day & as needed for symptoms of irregular blood glucose. Dispense sufficient amount for indicated testing frequency plus additional to accommodate PRN testing needs. 50 strip 0   • acetaminophen (TYLENOL) 325 MG tablet Take 2 tablets by mouth every 6 hours as needed for Pain or Fever         Past History     Past Medical History:  Past Medical History:   Diagnosis Date   • Alcoholism (HCC)    • Hypertension    • Ill-defined condition     ETOH   • Marijuana  intoxication.  Primary deficit of caloric and    ED Course: Progress Notes, Reevaluation, and Consults:         I am the first provider for this patient.    I reviewed the vital signs, available nursing notes, past medical history, past surgical history, family history and social history.    No data found.    Vital Signs-Reviewed the patient's vital signs.    Pulse Oximetry Analysis, Cardiac Monitor, 12 lead ekg:      Interpreted by the EP.      Records Reviewed: Nursing notes reviewed (Time of Review: 4:47 AM)    Procedures:   Critical Care Time: 0  If critical care time is note it is exclusive of any separately billable procedures.     Aspirin: (was aspirin given for stroke?)    Diagnosis     Disposition: DISPOSITION                  DISCHARGE MEDICATIONS:  Current Discharge Medication List             Details   Multiple Vitamin (MULTIVITAMIN) TABS tablet Take 1 tablet by mouth daily  Qty: 30 tablet, Refills: 0      folic acid (FOLVITE) 1 MG tablet Take 1 tablet by mouth daily  Qty: 30 tablet, Refills: 0      amLODIPine (NORVASC) 10 MG tablet Take 1 tablet by mouth nightly  Qty: 30 tablet, Refills: 0      vitamin B-1 (THIAMINE) 100 MG tablet Take 1 tablet by mouth daily  Qty: 30 tablet, Refills: 0      ibuprofen (IBU) 600 MG tablet Take 1 tablet by mouth every 6 hours as needed for Pain  Qty: 30 tablet, Refills: 0      glucose monitoring kit 1 kit by Does not apply route daily  Qty: 1 kit, Refills: 0      Lancets MISC 1 each by Does not apply route daily  Qty: 100 each, Refills: 0      blood glucose monitor strips Test 1 time a day & as needed for symptoms of irregular blood glucose. Dispense sufficient amount for indicated testing frequency plus additional to accommodate PRN testing needs.  Qty: 50 strip, Refills: 0    Comments: Brand per patient preference. May round up to next available package size.      acetaminophen (TYLENOL) 325 MG tablet Take 2 tablets by mouth every 6 hours as needed for Pain or Fever

## 2025-07-14 NOTE — PROGRESS NOTES
Advance Care Planning   Healthcare Decision Maker:    Primary Decision Maker: Saadia Flores - Brother/Sister - [phone]    Secondary Decision Maker: Benedict Keys - Brother/Sister - [phone]    Today we documented Decision Maker(s) consistent with Legal Next of Kin hierarchy.     The Advance Medical Directive the patient completed before is not on file.    Spiritual Health History and Assessment/Progress Note  [facility]    (P) Spiritual/Emotional Needs, Advance Care Planning,  ,  ,      Name: Arnold Keys MRN: [id number]    Age: 55 y.o.     Sex: male   Language: English   Sabianist: Jehovah's witness   Multifocal pneumonia     Date: 7/14/2025            Total Time Calculated: (P) 7 min              Spiritual Assessment began in [address] MEDICAL        Referral/Consult From: (P) Multi-disciplinary team   Encounter Overview/Reason: (P) Spiritual/Emotional Needs, Advance Care Planning  Service Provided For: (P) Patient    Cha, Belief, Meaning:   Patient has beliefs or practices that help with coping during difficult times  Family/Friends No family/friends present      Importance and Influence:  Patient has spiritual/personal beliefs that influence decisions regarding their health  Family/Friends No family/friends present    Community:  Patient is connected with a spiritual community  Family/Friends No family/friends present    Assessment and Plan of Care:     Patient Interventions include: Facilitated expression of thoughts and feelings  Family/Friends Interventions include: No family/friends present    Patient Plan of Care: Spiritual Care available upon further referral  Family/Friends Plan of Care: No family/friends present    Electronically signed by NEO Haines on 7/14/2025 at 6:36 PM

## 2025-07-14 NOTE — PROGRESS NOTES
TRANSFER - IN REPORT:    Verbal report received from CHLÉO Vitale on Arnold Keys  being received from ED for routine progression of patient care      Report consisted of patient's Situation, Background, Assessment and   Recommendations(SBAR).     Information from the following report(s) ED SBAR was reviewed with the receiving nurse.    Opportunity for questions and clarification was provided.      Assessment completed upon patient's arrival to unit and care assumed.

## 2025-07-14 NOTE — ED NOTES
At 0920H patient returned to bed. Patient placed back on the monitor. Temp 98.3F (oral). Patient monitoring continued. Awaiting transport for CT scan.

## 2025-07-14 NOTE — PROGRESS NOTES
4 Eyes Skin Assessment     NAME:  Arnold Keys  YOB: 1970  MEDICAL RECORD NUMBER:  [id number]    The patient is being assessed for  Admission    I agree that at least one RN has performed a thorough Head to Toe Skin Assessment on the patient. ALL assessment sites listed below have been assessed.      Areas assessed by both nurses:    Head, Face, Ears, Shoulders, Back, Chest, Arms, Elbows, Hands, Sacrum. Buttock, Coccyx, Ischium, Legs. Feet and Heels, and Under Medical Devices         Does the Patient have a Wound? No noted wound(s)       Kannan Prevention initiated by RN: No  Wound Care Orders initiated by RN: No    Pressure Injury (Stage 1,2,3,4, Unstageable, DTI, NWPT, and Complex wounds) if present, place Wound referral order by RN under : No    New Ostomies, if present place, Ostomy referral order under : No     Nurse 1 eSignature: Electronically signed by Moraima Williamson RN on 7/14/25 at 6:43 PM EDT    **SHARE this note so that the co-signing nurse can place an eSignature**    Nurse 2 eSignature: Alexys Avina, RN

## 2025-07-14 NOTE — ED NOTES
Pt given PB & J and given juices and given a turkey sandwich.  Pt is being very resilient to care, complaining and refusing to do things asked of him. Pt refused to eat sandwiches given and wont keep the blanket off his head. Unable to obtain a temp sat the present time.

## 2025-07-14 NOTE — ED NOTES
At 0928H, the forced air warming was discontinued. Temp 97.8F (rectal). Patient requested to use the rest room.

## 2025-07-14 NOTE — ED NOTES
Pt back from CT. Placed back on monitor.    Medications restarted.  Side rails raised x2. Call bell and bedside table within reach.

## 2025-07-14 NOTE — ED NOTES
MD Dr. Moss made aware of pt POC glucose of 48.     Pt given apple juice and glucose chewables. This nurse will follow up with BS level.     Pt on monitor.

## 2025-07-14 NOTE — ED NOTES
RN called lab to follow up blood cultures that were collected and sent at 0635 and 0647 respectively and still not in process.  stated that they will receive it now.

## 2025-07-14 NOTE — DISCHARGE INSTRUCTIONS
SHELTERS:  [facility]  Open 6 PM to 6 AM  [phone]  Contact Bridgett at [phone], during normal business hours, you must be cleared prior to attending the shelter.    [facility] [place]  [phone]    [address]  [phone]    Levaquin and Augmentin antibiotics through 07/21.  Do not smoke, drink or use illicit drugs including marijuana  Continue present medications  Follow with PCP.  
[Time Spent: ___ minutes] : I have spent [unfilled] minutes of time on the encounter.

## 2025-07-14 NOTE — H&P
History and Physical          Subjective     HPI: Arnold Keys is a 55 y.o. male with a PMHx as below who presented to the ED with general fatigue.  Patient states that it started yesterday.  Patient is homeless and denies any fevers, chills, abdominal pain, nausea, chest pain, cough, palpitations, shortness of breath, rashes, or FND.  Patient admits to being a drinker but denies any recent aspiration.    In the ED patients temperature was 92.4, respiratory rate 24, heart rate 40, blood pressure 134/89, saturating well on room air..  He was placed on Keren hugger.  Lab work was remarkable for lactic acid 3.65, potassium 2.9, glucose 45.  CXR showed moderate congestion with poor aeration and right upper lobe opacity.  CT of the head was unremarkable.  CTA of the chest was negative for pulmonary embolism but did show a multifocal pneumonia right greater than left with mild bronchitis and emphysema as well as trace bilateral pleural effusions.  CT of the abdomen pelvis showed ileus with multiple loops of moderately distended small bowel in the left abdomen with minimal mucosal thickening throughout left colon.  In the ED patient received glucose, IV vancomycin and IV cefepime as well as IV Flagyl.      PMHx:  Past Medical History:   Diagnosis Date    Alcoholism (HCC)     Hypertension     Ill-defined condition     ETOH    Marijuana smoker        PSurgHx:  No past surgical history on file.    SocialHx:  Social History     Socioeconomic History    Marital status: Single     Spouse name: None    Number of children: None    Years of education: None    Highest education level: None   Tobacco Use    Smoking status: Former     Passive exposure: Never   Substance and Sexual Activity    Alcohol use: Yes     Comment: one pint a day    Drug use: Yes     Types: Marijuana (Weed)     Comment: daily     Social Drivers of Health     Food Insecurity: No Food Insecurity (5/5/2025)    Hunger Vital Sign     Worried About Running Out

## 2025-07-15 LAB
AMPHET UR QL SCN: NEGATIVE
ANION GAP SERPL CALC-SCNC: 12 MMOL/L (ref 3–18)
BACTERIA SPEC CULT: NORMAL
BARBITURATES UR QL SCN: NEGATIVE
BASOPHILS # BLD: 0.03 K/UL (ref 0–0.1)
BASOPHILS NFR BLD: 0.3 % (ref 0–2)
BENZODIAZ UR QL: NEGATIVE
BUN SERPL-MCNC: 7 MG/DL (ref 6–23)
BUN/CREAT SERPL: 6 (ref 12–20)
CALCIUM SERPL-MCNC: 9.6 MG/DL (ref 8.5–10.1)
CANNABINOIDS UR QL SCN: POSITIVE
CHLORIDE SERPL-SCNC: 101 MMOL/L (ref 98–107)
CO2 SERPL-SCNC: 24 MMOL/L (ref 21–32)
COCAINE UR QL SCN: NEGATIVE
CREAT SERPL-MCNC: 1.11 MG/DL (ref 0.6–1.3)
DIFFERENTIAL METHOD BLD: ABNORMAL
EOSINOPHIL # BLD: 0.13 K/UL (ref 0–0.4)
EOSINOPHIL NFR BLD: 1.2 % (ref 0–5)
ERYTHROCYTE [DISTWIDTH] IN BLOOD BY AUTOMATED COUNT: 12.5 % (ref 11.6–14.5)
FENTANYL: NEGATIVE
FOLATE SERPL-MCNC: >20 NG/ML (ref 4.6–34.8)
GLUCOSE SERPL-MCNC: 84 MG/DL (ref 74–108)
HCT VFR BLD AUTO: 31.2 % (ref 36–48)
HGB BLD-MCNC: 10.5 G/DL (ref 13–16)
IMM GRANULOCYTES # BLD AUTO: 0.05 K/UL (ref 0–0.04)
IMM GRANULOCYTES NFR BLD AUTO: 0.5 % (ref 0–0.5)
L PNEUMO AG UR QL: NEGATIVE
LYMPHOCYTES # BLD: 1.46 K/UL (ref 0.9–3.6)
LYMPHOCYTES NFR BLD: 13.8 % (ref 21–52)
Lab: ABNORMAL
MAGNESIUM SERPL-MCNC: 1.6 MG/DL (ref 1.6–2.6)
MCH RBC QN AUTO: 33.4 PG (ref 24–34)
MCHC RBC AUTO-ENTMCNC: 33.7 G/DL (ref 31–37)
MCV RBC AUTO: 99.4 FL (ref 78–100)
METHADONE UR QL: NEGATIVE
MONOCYTES # BLD: 1.15 K/UL (ref 0.05–1.2)
MONOCYTES NFR BLD: 10.9 % (ref 3–10)
NEGATIVE CONTROL: NEGATIVE
NEUTS SEG # BLD: 7.74 K/UL (ref 1.8–8)
NEUTS SEG NFR BLD: 73.3 % (ref 40–73)
NRBC # BLD: 0 K/UL (ref 0–0.01)
NRBC BLD-RTO: 0 PER 100 WBC
OPIATES UR QL: NEGATIVE
OXYCODONE UR QL SCN: NEGATIVE
PH, URINE: 5 (ref 4.6–8)
PLATELET # BLD AUTO: 209 K/UL (ref 135–420)
PMV BLD AUTO: 11 FL (ref 9.2–11.8)
POSITIVE CONTROL: POSITIVE
POTASSIUM SERPL-SCNC: 4.4 MMOL/L (ref 3.5–5.5)
PROCALCITONIN SERPL-MCNC: 0.49 NG/ML
RBC # BLD AUTO: 3.14 M/UL (ref 4.35–5.65)
S PNEUM AG UR QL IA.RAPID: NEGATIVE
SERVICE CMNT-IMP: NORMAL
SODIUM SERPL-SCNC: 137 MMOL/L (ref 136–145)
TSH W FREE THYROID IF ABNORMAL: 2.14 UIU/ML (ref 0.27–4.2)
VIT B12 SERPL-MCNC: 231 PG/ML (ref 211–911)
WBC # BLD AUTO: 10.6 K/UL (ref 4.6–13.2)

## 2025-07-15 PROCEDURE — 99232 SBSQ HOSP IP/OBS MODERATE 35: CPT | Performed by: STUDENT IN AN ORGANIZED HEALTH CARE EDUCATION/TRAINING PROGRAM

## 2025-07-15 PROCEDURE — 80048 BASIC METABOLIC PNL TOTAL CA: CPT

## 2025-07-15 PROCEDURE — 82607 VITAMIN B-12: CPT

## 2025-07-15 PROCEDURE — 87899 AGENT NOS ASSAY W/OPTIC: CPT

## 2025-07-15 PROCEDURE — 97161 PT EVAL LOW COMPLEX 20 MIN: CPT

## 2025-07-15 PROCEDURE — 6360000002 HC RX W HCPCS: Performed by: STUDENT IN AN ORGANIZED HEALTH CARE EDUCATION/TRAINING PROGRAM

## 2025-07-15 PROCEDURE — 1100000003 HC PRIVATE W/ TELEMETRY

## 2025-07-15 PROCEDURE — 84443 ASSAY THYROID STIM HORMONE: CPT

## 2025-07-15 PROCEDURE — 2500000003 HC RX 250 WO HCPCS: Performed by: STUDENT IN AN ORGANIZED HEALTH CARE EDUCATION/TRAINING PROGRAM

## 2025-07-15 PROCEDURE — 36415 COLL VENOUS BLD VENIPUNCTURE: CPT

## 2025-07-15 PROCEDURE — 6370000000 HC RX 637 (ALT 250 FOR IP): Performed by: STUDENT IN AN ORGANIZED HEALTH CARE EDUCATION/TRAINING PROGRAM

## 2025-07-15 PROCEDURE — 82746 ASSAY OF FOLIC ACID SERUM: CPT

## 2025-07-15 PROCEDURE — 85025 COMPLETE CBC W/AUTO DIFF WBC: CPT

## 2025-07-15 PROCEDURE — 92526 ORAL FUNCTION THERAPY: CPT

## 2025-07-15 PROCEDURE — 92610 EVALUATE SWALLOWING FUNCTION: CPT

## 2025-07-15 PROCEDURE — 83735 ASSAY OF MAGNESIUM: CPT

## 2025-07-15 PROCEDURE — 6360000002 HC RX W HCPCS: Performed by: EMERGENCY MEDICINE

## 2025-07-15 PROCEDURE — 94761 N-INVAS EAR/PLS OXIMETRY MLT: CPT

## 2025-07-15 PROCEDURE — 84145 PROCALCITONIN (PCT): CPT

## 2025-07-15 PROCEDURE — 80307 DRUG TEST PRSMV CHEM ANLYZR: CPT

## 2025-07-15 PROCEDURE — 2580000003 HC RX 258: Performed by: STUDENT IN AN ORGANIZED HEALTH CARE EDUCATION/TRAINING PROGRAM

## 2025-07-15 RX ORDER — BENZONATATE 100 MG/1
200 CAPSULE ORAL 3 TIMES DAILY PRN
Status: DISCONTINUED | OUTPATIENT
Start: 2025-07-15 | End: 2025-07-17 | Stop reason: HOSPADM

## 2025-07-15 RX ORDER — AMLODIPINE BESYLATE 10 MG/1
10 TABLET ORAL DAILY
Status: DISCONTINUED | OUTPATIENT
Start: 2025-07-15 | End: 2025-07-17 | Stop reason: HOSPADM

## 2025-07-15 RX ORDER — LACTOBACILLUS RHAMNOSUS GG 10B CELL
1 CAPSULE ORAL
Status: DISCONTINUED | OUTPATIENT
Start: 2025-07-15 | End: 2025-07-17 | Stop reason: HOSPADM

## 2025-07-15 RX ADMIN — FOLIC ACID 1 MG: 1 TABLET ORAL at 08:55

## 2025-07-15 RX ADMIN — AMLODIPINE BESYLATE 10 MG: 10 TABLET ORAL at 10:05

## 2025-07-15 RX ADMIN — ACETAMINOPHEN 650 MG: 325 TABLET ORAL at 06:37

## 2025-07-15 RX ADMIN — THERA TABS 1 TABLET: TAB at 08:55

## 2025-07-15 RX ADMIN — ACETAMINOPHEN 650 MG: 325 TABLET ORAL at 19:18

## 2025-07-15 RX ADMIN — METRONIDAZOLE 500 MG: 500 INJECTION, SOLUTION INTRAVENOUS at 05:29

## 2025-07-15 RX ADMIN — Medication 100 MG: at 08:55

## 2025-07-15 RX ADMIN — ACETAMINOPHEN 650 MG: 325 TABLET ORAL at 00:50

## 2025-07-15 RX ADMIN — PIPERACILLIN AND TAZOBACTAM 3375 MG: 3; .375 INJECTION, POWDER, FOR SOLUTION INTRAVENOUS; PARENTERAL at 14:07

## 2025-07-15 RX ADMIN — PIPERACILLIN AND TAZOBACTAM 3375 MG: 3; .375 INJECTION, POWDER, FOR SOLUTION INTRAVENOUS; PARENTERAL at 06:34

## 2025-07-15 RX ADMIN — SODIUM CHLORIDE, PRESERVATIVE FREE 10 ML: 5 INJECTION INTRAVENOUS at 22:30

## 2025-07-15 RX ADMIN — SODIUM CHLORIDE, PRESERVATIVE FREE 10 ML: 5 INJECTION INTRAVENOUS at 22:31

## 2025-07-15 RX ADMIN — PIPERACILLIN AND TAZOBACTAM 3375 MG: 3; .375 INJECTION, POWDER, FOR SOLUTION INTRAVENOUS; PARENTERAL at 22:30

## 2025-07-15 RX ADMIN — Medication 1 CAPSULE: at 10:05

## 2025-07-15 RX ADMIN — SODIUM CHLORIDE, PRESERVATIVE FREE 10 ML: 5 INJECTION INTRAVENOUS at 11:07

## 2025-07-15 RX ADMIN — SODIUM CHLORIDE, PRESERVATIVE FREE 10 ML: 5 INJECTION INTRAVENOUS at 08:55

## 2025-07-15 ASSESSMENT — PAIN DESCRIPTION - ORIENTATION
ORIENTATION: LOWER;POSTERIOR
ORIENTATION: LOWER;POSTERIOR
ORIENTATION: OTHER (COMMENT);LOWER

## 2025-07-15 ASSESSMENT — PAIN SCALES - GENERAL
PAINLEVEL_OUTOF10: 3
PAINLEVEL_OUTOF10: 0
PAINLEVEL_OUTOF10: 2
PAINLEVEL_OUTOF10: 7
PAINLEVEL_OUTOF10: 0

## 2025-07-15 ASSESSMENT — PAIN DESCRIPTION - FREQUENCY
FREQUENCY: INTERMITTENT

## 2025-07-15 ASSESSMENT — PAIN DESCRIPTION - ONSET
ONSET: ON-GOING
ONSET: GRADUAL
ONSET: ON-GOING

## 2025-07-15 ASSESSMENT — PAIN - FUNCTIONAL ASSESSMENT
PAIN_FUNCTIONAL_ASSESSMENT: ACTIVITIES ARE NOT PREVENTED

## 2025-07-15 ASSESSMENT — PAIN DESCRIPTION - DESCRIPTORS
DESCRIPTORS: ACHING;SPASM
DESCRIPTORS: NAGGING
DESCRIPTORS: ACHING

## 2025-07-15 ASSESSMENT — PAIN DESCRIPTION - PAIN TYPE
TYPE: ACUTE PAIN

## 2025-07-15 ASSESSMENT — PAIN DESCRIPTION - LOCATION
LOCATION: BACK
LOCATION: BACK;ABDOMEN
LOCATION: BACK

## 2025-07-15 NOTE — PROGRESS NOTES
Pt variety home med in a container, med placed in pharmacy enabled bag. Bag placed in the clear box inside med room on [address].

## 2025-07-15 NOTE — PROGRESS NOTES
[facility]  Progress Note    Patient: Arnold Keys Age: 55 y.o. : 1970 MR#: [id number] SSN: [id number]  Date/Time: 7/15/2025     No chief complaint on file.      Subjective:   Patient seen and examined. No acute events overnight. .  No acute complaints, on room air.  Reports no shortness of breath or cough.  Assessment/Plan:   Sepsis secondary to bilateral multifocal pneumonia, as evidenced by hypothermia, tachypnea   Colitis  Hypothermia-improved  Bradycardia-improved  Lactic acidosis-resolved  Hypokalemia  Alcoholism  Hypertension  Marijuana use    -ID following, appreciate assistance in care.  Continue to follow cultures.  Transition to p.o. agent once appropriate  - Will continue CIWA for now.  Lactic acid normalized  Resume home amlodipine, as needed hydralazine  - Patient cessation counseled on tobacco, marijuana, alcohol use      Dispo plan: Home once medically stable versus SNF/ARU evaluation ongoing, anticipated discharge date -    I spent 39 minutes with the patient in face-to-face consultation, of which greater than 50% was spent in counseling and coordination of care as described above.    Case discussed with:  [x]Patient  []Family  [x]Nursing  [x]Case Management  DVT Prophylaxis:  [x]Lovenox  []Hep SQ  []SCDs  []Coumadin   []Eliquis/Xarelto     Objective:   VS: BP (!) 145/91   Pulse 54   Temp 98.6 °F (37 °C) (Oral)   Resp 20   Ht 1.778 m (5' 10\")   Wt 73.3 kg (161 lb 8 oz)   SpO2 99%   BMI 23.17 kg/m²    Tmax/24hrs: Temp (24hrs), Av.3 °F (37.4 °C), Min:98.6 °F (37 °C), Max:100.3 °F (37.9 °C)  IOBRIEF  Intake/Output Summary (Last 24 hours) at 7/15/2025 1322  Last data filed at 7/15/2025 0640  Gross per 24 hour   Intake 904.73 ml   Output 1400 ml   Net -495.27 ml     General:  Alert, cooperative, no acute distress    HEENT: PERRLA, anicteric sclerae.  Pulmonary:  CTA Bilaterally. No Wheezing/Rales. On room air.   Cardiovascular:

## 2025-07-15 NOTE — PROGRESS NOTES
PHYSICAL THERAPY EVALUATION/DISCHARGE    Patient: Arnold Keys (55 y.o. male)  Date: 7/15/2025  Primary Diagnosis: Syncope and collapse [R55]  Hypokalemia [E87.6]  Hypoglycemia [E16.2]  Hypothermia, initial encounter [T68.XXXA]  Multifocal pneumonia [J18.9]  Pneumonia due to organism [J18.9]       Precautions: Fall Risk, Seizure,  ,  ,  ,  ,  ,  ,    PLOF: Independent. Homeless. Ambulates with walking stick also uses shopping chart.      ASSESSMENT AND RECOMMENDATIONS:  Patient resting in bed upon entry and agreeable to PT evaluation. He is independent with functional mobility. Ambulates with fair balance without AD in the room, usually uses walking stick. States he is at his baseline level of mobility. Educated to call for nursing supervision with mobility.         Further Equipment Recommendations for Discharge: none    AMPA: AM-PAC Inpatient Mobility Raw Score : 22      At this time and based on an AM-PAC score, no further PT is recommended upon discharge.  Recommend patient returns to prior setting with prior services.    This [facility] score should be considered in conjunction with interdisciplinary team recommendations to determine the most appropriate discharge setting. Patient's social support, diagnosis, medical stability, and prior level of function should also be taken into consideration.     SUBJECTIVE:   Patient stated “I am good.”    OBJECTIVE DATA SUMMARY:     Past Medical History:   Diagnosis Date    Alcoholism (HCC)     Homelessness     Hypertension     Ill-defined condition     ETOH    Marijuana smoker    No past surgical history on file.    Home Situation:     Critical Behavior:  Orientation  Overall Orientation Status: Within Functional Limits       Strength:    Strength: Within functional limits        Range Of Motion:  AROM: Within functional limits       Functional Mobility:  Bed Mobility:     Bed Mobility Training  Bed Mobility Training: Yes  Supine to Sit: Modified independent  Sit to

## 2025-07-15 NOTE — PLAN OF CARE
Problem: Safety - Adult  Goal: Free from fall injury  Outcome: Progressing  Flowsheets (Taken 7/15/2025 0226)  Free From Fall Injury: Instruct family/caregiver on patient safety     Problem: Pain  Goal: Verbalizes/displays adequate comfort level or baseline comfort level  Outcome: Progressing  Flowsheets (Taken 7/15/2025 0226)  Verbalizes/displays adequate comfort level or baseline comfort level:   Assess pain using appropriate pain scale   Encourage patient to monitor pain and request assistance   Administer analgesics based on type and severity of pain and evaluate response   Implement non-pharmacological measures as appropriate and evaluate response     Problem: Seizure Precautions  Goal: Remains free of injury related to seizures activity  Outcome: Progressing  Flowsheets  Taken 7/15/2025 0226 by Christine Levine RN  Remains free of injury related to seizure activity:   Maintain airway, patient safety  and administer oxygen as ordered   Monitor patient for seizure activity, document and report duration and description of seizure to Licensed Independent Practitioner   If seizure occurs, turn patient to side and suction secretions as needed  Taken 7/14/2025 1521 by Moraima Williamson RN  Remains free of injury related to seizure activity: Monitor patient for seizure activity, document and report duration and description of seizure to Licensed Independent Practitioner     Problem: Chronic Conditions and Co-morbidities  Goal: Patient's chronic conditions and co-morbidity symptoms are monitored and maintained or improved  Outcome: Progressing  Flowsheets (Taken 7/15/2025 0226)  Care Plan - Patient's Chronic Conditions and Co-Morbidity Symptoms are Monitored and Maintained or Improved:   Monitor and assess patient's chronic conditions and comorbid symptoms for stability, deterioration, or improvement   Collaborate with multidisciplinary team to address chronic and comorbid conditions and prevent exacerbation or

## 2025-07-15 NOTE — PLAN OF CARE
Problem: SLP Adult - Impaired Swallowing  Goal: By Discharge: Advance to least restrictive diet without signs or symptoms of aspiration for planned discharge setting.  See evaluation for individualized goals.  Description: Description: Patient will:  1. Tolerate PO trials with 0 s/s overt distress in 4/5 trials- met 7/15/25  2. Utilize compensatory swallow strategies/maneuvers (decrease bite/sip, size/rate, alt. liq/sol) with min cues in 4/5 trials- met 7/15/25    Rec:     Regular diet with thin liquids  Aspiration precautions  HOB >45 during po intake, remain >30 for 30-45 minutes after po   Small bites/sips; alternate liquid/solid with slow feeding rate   Oral care TID  Meds per pt preference    Outcome: Completed   SPEECH LANGUAGE PATHOLOGY BEDSIDE SWALLOW EVALUATION/TREATMENT& DISCHARGE    Patient: Arnold Keys (55 y.o. male)  Date: 7/15/2025  Primary Diagnosis: Syncope and collapse [R55]  Hypokalemia [E87.6]  Hypoglycemia [E16.2]  Hypothermia, initial encounter [T68.XXXA]  Multifocal pneumonia [J18.9]  Pneumonia due to organism [J18.9]       Precautions: Aspiration  PLOF: As per H&P  ASSESSMENT:  Based on the objective data described below, the patient presents with oropharyngeal swallow fxn essentially WFL. Pt tolerated reg solid, puree, and thin liquids +/- straw consecutive swallows without any overt s/sx of aspiration.  Strength, ROM, and coordination of the orofacial musculature were all found to be WFL. Mastication was appropriate with timely a-p transit and positive oral clearance observed across all trials. Laryngeal elevation was functional/timely to palpation with all PO trials. Pt safe for initiation of reg solid diet with thin liquids, aspiration precautions, oral care TID, and meds as tolerated. Pt has achieved LRD, skilled ST is no longer required.     TREATMENT:  Skilled therapy initiated; Educated patient on aspiration precautions and importance of compensatory swallow techniques to  toward stated goals and plan of care.  []            Patient understands intent and goals of therapy, neutral about participation.  []            Patient unable to participate in goal setting/plan of care secondary to cognition, hearing/vision deficits; education ongoing with interdisciplinary staff   []            Handout regarding diet recommendations and thickener instructions provided.  [x]         Posted safety precautions in patient's room.    Thank you for this referral,     Jeanine Rodriguez M.S., CCC-SLP  Speech Language Pathologist

## 2025-07-15 NOTE — CARE COORDINATION
07/15/25 1317   Service Assessment   Patient Orientation Alert and Oriented   Cognition Alert   History Provided By Patient   Primary Caregiver Self   Accompanied By/Relationship no one at bedside   Support Systems Family Members   Patient's Healthcare Decision Maker is: Named in Scanned ACP Document   PCP Verified by CM Yes   Last Visit to PCP Within last year   Prior Functional Level Independent in ADLs/IADLs   Current Functional Level Independent in ADLs/IADLs   Can patient return to prior living arrangement Yes   Ability to make needs known: Good   Family able to assist with home care needs: No   Would you like for me to discuss the discharge plan with any other family members/significant others, and if so, who? Yes  (Saadia- sister)   Financial Resources Medicaid   Community Resources Transportation   CM/SW Referral Shelter placement   Social/Functional History   Lives With Family  (Sister part-time)   Type of Home Apartment   Home Layout One level   Home Access Level entry   Bathroom Shower/Tub Tub/Shower unit   Bathroom Toilet Standard   Bathroom Equipment Grab bars in shower   Bathroom Accessibility Accessible   Home Equipment Cane   Receives Help From Family;Friend(s)   Prior Level of Assist for ADLs Independent   Prior Level of Assist for Homemaking Independent   Homemaking Responsibilities No   Ambulation Assistance Independent   Prior Level of Assist for Transfers Independent   Active  No   Patient's  [facility]   Mode of Transportation Bus   Education not applicable   Occupation Unemployed   Type of Occupation not applicable   Discharge Planning   Type of Residence Apartment   Living Arrangements Family Members   Current Services Prior To Admission None   Potential Assistance Needed N/A   DME Ordered? No   Potential Assistance Purchasing Medications No   Type of Home Care Services None   Patient expects to be discharged to: Apartment   Follow Up Appointment: Best Day/Time  Monday AM

## 2025-07-15 NOTE — CONSULTS
Infectious Disease Consultation Note        Reason: Sepsis, multifocal pneumonia    Current abx Prior abx   Piperacillin/tazobactam till 7/14      Lines:       Assessment :  55 y.o. male with history of alcoholism, marijuana use presented to [facility] on 7/14/2025 with generalized weakness.    Clinical presentation consistent with sepsis-present on admission as evidenced by hypothermia, tachypnea due to multifocal pneumonia/aspiration pneumonia    Patient seems to be responding to current antibiotics.  Nasal MRSA screen 7/15-pending      Recommendations:    Agree with piperacillin/tazobactam  Follow-up nasal MRSA, blood cultures, Streptococcus pneumoniae/Legionella urinary antigen  Will switch to oral antibiotics in 1 to 2 days patient about test results, clinical course    Thank you for consultation request. Above plan was discussed in details with patient, and dr Bowens. Please call me if any further questions or concerns. Will continue to participate in the care of this patient.  HPI:    55 y.o. male with history of alcoholism, marijuana use presented to [facility] on 7/14/2025 with generalized weakness.    Patient states that he was drinking alcohol with his friends Saturday evening and and subsequently noted to have impaired consciousness on 7/13.  He woke up with significant sweats.  He felt generally weak.  Hence came to the ED on 7/14 for further evaluation.  In the ED patients temperature was 92.4, respiratory rate 24, heart rate 40, blood pressure 134/89, saturating well on room air..  He was placed on Keren hugger.  Lab work was remarkable for lactic acid 3.65, potassium 2.9, glucose 45.  CXR showed moderate congestion with poor aeration and right upper lobe opacity.  CT of the head was unremarkable.  CTA of the chest was negative for pulmonary embolism but did show a multifocal pneumonia right greater than left with mild bronchitis and emphysema as well as trace bilateral pleural effusions.  CT of the abdomen pelvis

## 2025-07-16 LAB
ANION GAP SERPL CALC-SCNC: 13 MMOL/L (ref 3–18)
BACTERIA SPEC CULT: NORMAL
BACTERIA SPEC CULT: NORMAL
BASOPHILS # BLD: 0.04 K/UL (ref 0–0.1)
BASOPHILS NFR BLD: 0.4 % (ref 0–2)
BUN SERPL-MCNC: 6 MG/DL (ref 6–23)
BUN/CREAT SERPL: 5 (ref 12–20)
CALCIUM SERPL-MCNC: 9.6 MG/DL (ref 8.5–10.1)
CHLORIDE SERPL-SCNC: 101 MMOL/L (ref 98–107)
CO2 SERPL-SCNC: 24 MMOL/L (ref 21–32)
CREAT SERPL-MCNC: 1.05 MG/DL (ref 0.6–1.3)
DIFFERENTIAL METHOD BLD: ABNORMAL
EOSINOPHIL # BLD: 0.43 K/UL (ref 0–0.4)
EOSINOPHIL NFR BLD: 4.2 % (ref 0–5)
ERYTHROCYTE [DISTWIDTH] IN BLOOD BY AUTOMATED COUNT: 12.6 % (ref 11.6–14.5)
GLUCOSE SERPL-MCNC: 99 MG/DL (ref 74–108)
HCT VFR BLD AUTO: 35.4 % (ref 36–48)
HGB BLD-MCNC: 11.8 G/DL (ref 13–16)
IMM GRANULOCYTES # BLD AUTO: 0.04 K/UL (ref 0–0.04)
IMM GRANULOCYTES NFR BLD AUTO: 0.4 % (ref 0–0.5)
LYMPHOCYTES # BLD: 1.43 K/UL (ref 0.9–3.6)
LYMPHOCYTES NFR BLD: 14.1 % (ref 21–52)
MCH RBC QN AUTO: 32.8 PG (ref 24–34)
MCHC RBC AUTO-ENTMCNC: 33.3 G/DL (ref 31–37)
MCV RBC AUTO: 98.3 FL (ref 78–100)
MONOCYTES # BLD: 1.06 K/UL (ref 0.05–1.2)
MONOCYTES NFR BLD: 10.5 % (ref 3–10)
NEUTS SEG # BLD: 7.12 K/UL (ref 1.8–8)
NEUTS SEG NFR BLD: 70.4 % (ref 40–73)
NRBC # BLD: 0 K/UL (ref 0–0.01)
NRBC BLD-RTO: 0 PER 100 WBC
PLATELET # BLD AUTO: 234 K/UL (ref 135–420)
PMV BLD AUTO: 11.2 FL (ref 9.2–11.8)
POTASSIUM SERPL-SCNC: 3.3 MMOL/L (ref 3.5–5.5)
RBC # BLD AUTO: 3.6 M/UL (ref 4.35–5.65)
SERVICE CMNT-IMP: NORMAL
SODIUM SERPL-SCNC: 138 MMOL/L (ref 136–145)
WBC # BLD AUTO: 10.1 K/UL (ref 4.6–13.2)

## 2025-07-16 PROCEDURE — 6370000000 HC RX 637 (ALT 250 FOR IP): Performed by: STUDENT IN AN ORGANIZED HEALTH CARE EDUCATION/TRAINING PROGRAM

## 2025-07-16 PROCEDURE — 1100000003 HC PRIVATE W/ TELEMETRY

## 2025-07-16 PROCEDURE — 94761 N-INVAS EAR/PLS OXIMETRY MLT: CPT

## 2025-07-16 PROCEDURE — 36415 COLL VENOUS BLD VENIPUNCTURE: CPT

## 2025-07-16 PROCEDURE — 2580000003 HC RX 258: Performed by: STUDENT IN AN ORGANIZED HEALTH CARE EDUCATION/TRAINING PROGRAM

## 2025-07-16 PROCEDURE — 6360000002 HC RX W HCPCS: Performed by: STUDENT IN AN ORGANIZED HEALTH CARE EDUCATION/TRAINING PROGRAM

## 2025-07-16 PROCEDURE — 2500000003 HC RX 250 WO HCPCS: Performed by: STUDENT IN AN ORGANIZED HEALTH CARE EDUCATION/TRAINING PROGRAM

## 2025-07-16 PROCEDURE — 99232 SBSQ HOSP IP/OBS MODERATE 35: CPT | Performed by: STUDENT IN AN ORGANIZED HEALTH CARE EDUCATION/TRAINING PROGRAM

## 2025-07-16 PROCEDURE — 80048 BASIC METABOLIC PNL TOTAL CA: CPT

## 2025-07-16 PROCEDURE — 85025 COMPLETE CBC W/AUTO DIFF WBC: CPT

## 2025-07-16 RX ADMIN — PIPERACILLIN AND TAZOBACTAM 3375 MG: 3; .375 INJECTION, POWDER, FOR SOLUTION INTRAVENOUS; PARENTERAL at 06:18

## 2025-07-16 RX ADMIN — FOLIC ACID 1 MG: 1 TABLET ORAL at 09:29

## 2025-07-16 RX ADMIN — THERA TABS 1 TABLET: TAB at 09:30

## 2025-07-16 RX ADMIN — SODIUM CHLORIDE, PRESERVATIVE FREE 10 ML: 5 INJECTION INTRAVENOUS at 09:31

## 2025-07-16 RX ADMIN — Medication 100 MG: at 09:30

## 2025-07-16 RX ADMIN — AMLODIPINE BESYLATE 10 MG: 10 TABLET ORAL at 09:30

## 2025-07-16 RX ADMIN — Medication 1 CAPSULE: at 09:29

## 2025-07-16 RX ADMIN — PIPERACILLIN AND TAZOBACTAM 3375 MG: 3; .375 INJECTION, POWDER, FOR SOLUTION INTRAVENOUS; PARENTERAL at 14:41

## 2025-07-16 RX ADMIN — POTASSIUM CHLORIDE 40 MEQ: 1500 TABLET, EXTENDED RELEASE ORAL at 14:39

## 2025-07-16 ASSESSMENT — PAIN SCALES - GENERAL
PAINLEVEL_OUTOF10: 0

## 2025-07-16 NOTE — PROGRESS NOTES
Occupational Therapy  Occupational Therapy Screen    Patient: Arnold Keys (55 y.o. male)  Date: 7/16/2025  Diagnosis: Syncope and collapse [R55]  Hypokalemia [E87.6]  Hypoglycemia [E16.2]  Hypothermia, initial encounter [T68.XXXA]  Multifocal pneumonia [J18.9]  Pneumonia due to organism [J18.9] Multifocal pneumonia        OT order received and chart reviewed. The role of Occupational Therapy was explained to the patient and that the evaluation would consist of assessing items such as self-care, functional mobility, cognition, and upper extremity skills. [facility] completed via patient report.     [facility] AM-PAC® Daily Activity Inpatient Short Form (6-Clicks)     How much difficulty does the patient currently have     Total    A Lot   A Little    None    Putting on and taking off regular lower body clothing?    [] 1 [] 2 [] 3 [x] 4   2. Bathing (which includes washing, rinsing, drying)?     [] 1 [] 2 [] 3 [x] 4   3. Toileting (which includes using toilet, bedpan, or urinal)?    [] 1 [] 2 [] 3 [x] 4   4. Putting on and taking off regular upper body clothing?    [] 1 [] 2 [] 3 [x] 4   5. Taking care of personal grooming?    [] 1 [] 2 [] 3 [x] 4   6. Eating meals?    [] 1 [] 2 [] 3 [x] 4                                                                  Total 24      Patient presents at functional baseline this date for ADL/IADL performance. Formal OT evaluation is not indicated. OT will sign off. Thank you.    Bailey Aguayo, OT

## 2025-07-16 NOTE — PROGRESS NOTES
Infectious Disease progress Note        Reason: Sepsis, multifocal pneumonia    Current abx Prior abx   Piperacillin/tazobactam till 7/14      Lines:       Assessment :  55 y.o. male with history of alcoholism, marijuana use presented to [facility] on 7/14/2025 with generalized weakness.    Clinical presentation consistent with sepsis-present on admission as evidenced by hypothermia, tachypnea due to multifocal pneumonia/aspiration pneumonia    Patient seems to be responding to current antibiotics.  Nasal MRSA screen 7/15-pending      Recommendations:    continue piperacillin/tazobactam  Follow-up nasal MRSA, blood cultures,  Will switch to oral antibiotics in am based on above test results, clinical course     Above plan was discussed in details with patient, and dr Bowens. Please call me if any further questions or concerns. Will continue to participate in the care of this patient.  HPI:        Patient states that he feels better today   Denies any runny nose, sore throat.    Past Medical History:   Diagnosis Date    Alcoholism (HCC)     Homelessness     Hypertension     Ill-defined condition     ETOH    Marijuana smoker        No past surgical history on file.    @[email or web address]@    Current Facility-Administered Medications   Medication Dose Route Frequency    lactobacillus (CULTURELLE) capsule 1 capsule  1 capsule Oral Daily with breakfast    amLODIPine (NORVASC) tablet 10 mg  10 mg Oral Daily    benzonatate (TESSALON) capsule 200 mg  200 mg Oral TID PRN    sodium chloride flush 0.9 % injection 5-40 mL  5-40 mL IntraVENous 2 times per day    sodium chloride flush 0.9 % injection 5-40 mL  5-40 mL IntraVENous PRN    0.9 % sodium chloride infusion   IntraVENous PRN    thiamine mononitrate tablet 100 mg  100 mg Oral Daily    folic acid (FOLVITE) tablet 1 mg  1 mg Oral Daily    multivitamin 1 tablet  1 tablet Oral Daily    LORazepam (ATIVAN) tablet 1 mg  1 mg Oral Q1H PRN    Or    LORazepam (ATIVAN) injection 1 mg  1 mg

## 2025-07-16 NOTE — PLAN OF CARE
Problem: Pain  Goal: Verbalizes/displays adequate comfort level or baseline comfort level  Outcome: Progressing  Flowsheets (Taken 7/16/2025 1513)  Verbalizes/displays adequate comfort level or baseline comfort level:   Encourage patient to monitor pain and request assistance   Assess pain using appropriate pain scale   Administer analgesics based on type and severity of pain and evaluate response   Implement non-pharmacological measures as appropriate and evaluate response     Problem: Seizure Precautions  Goal: Remains free of injury related to seizures activity  Outcome: Progressing  Flowsheets (Taken 7/16/2025 1513)  Remains free of injury related to seizure activity:   Instruct patient/family to notify RN of any seizure activity   Instruct patient/family to call for assistance with activity based on assessment     Problem: Safety - Adult  Goal: Free from fall injury  Outcome: Progressing  Note: Patient will remain free from falls during hospital visit  Nurse will ensure patients bed is in lowest position, wheels locked and alarm on.  Nurse will ensure all fall safety protocols have been initiated including call light within reach, fall sign posted and yellow non slip socks are being worn.  Patient will demonstrate understanding of fall precautions and safety  Patient will demonstrate understanding of utilizing call bell for assistance with needs.      Problem: Infection - Adult  Goal: Absence of infection during hospitalization  Outcome: Progressing  Flowsheets (Taken 7/16/2025 1513)  Absence of infection during hospitalization:   Assess and monitor for signs and symptoms of infection   Monitor lab/diagnostic results   Administer medications as ordered   Instruct and encourage patient and family to use good hand hygiene technique

## 2025-07-16 NOTE — PROGRESS NOTES
[facility]  Progress Note    Patient: Arnold Keys Age: 55 y.o. : 1970 MR#: [id number] SSN: [id number]  Date/Time: 2025     No chief complaint on file.    Subjective:   Patient seen and examined. No acute events overnight. .  Reports feeling better, no additional complaints  Assessment/Plan:   Sepsis secondary to bilateral multifocal pneumonia, as evidenced by hypothermia, tachypnea   Colitis  Hypothermia-improved  Bradycardia-improved  Lactic acidosis-resolved  Hypokalemia  Alcoholism  Hypertension  Marijuana use    -ID following, appreciate assistance in care.  Continue to follow cultures.  Transition to p.o. agent once appropriate  - DC CIWA. Lactic acid normalized  Resume home amlodipine, as needed hydralazine  - Patient cessation counseled on tobacco, marijuana, alcohol use      Dispo plan: Home once medically stable anticipated discharge date  pending results of further workup, continue medical stability    I spent 35 minutes with the patient in face-to-face consultation, of which greater than 50% was spent in counseling and coordination of care as described above.    Case discussed with:  [x]Patient  []Family  [x]Nursing  [x]Case Management  DVT Prophylaxis:  [x]Lovenox  []Hep SQ  []SCDs  []Coumadin   []Eliquis/Xarelto     Objective:   VS: BP (!) 132/90   Pulse 62   Temp 99.3 °F (37.4 °C) (Oral)   Resp 20   Ht 1.778 m (5' 10\")   Wt 73.3 kg (161 lb 8 oz)   SpO2 98%   BMI 23.17 kg/m²    Tmax/24hrs: Temp (24hrs), Av.3 °F (37.4 °C), Min:98.6 °F (37 °C), Max:99.9 °F (37.7 °C)  IOBRIEF  Intake/Output Summary (Last 24 hours) at 2025 1355  Last data filed at 2025 0912  Gross per 24 hour   Intake 662.27 ml   Output 1150 ml   Net -487.73 ml     General:  Alert, cooperative, no acute distress    HEENT: PERRLA, anicteric sclerae.  Pulmonary:  CTA Bilaterally. No Wheezing/Rales. On room air.   Cardiovascular: Regular rate and

## 2025-07-17 VITALS
BODY MASS INDEX: 23.12 KG/M2 | OXYGEN SATURATION: 100 % | RESPIRATION RATE: 18 BRPM | WEIGHT: 161.5 LBS | DIASTOLIC BLOOD PRESSURE: 98 MMHG | HEIGHT: 70 IN | HEART RATE: 59 BPM | SYSTOLIC BLOOD PRESSURE: 133 MMHG | TEMPERATURE: 98.1 F

## 2025-07-17 LAB
ANION GAP SERPL CALC-SCNC: 11 MMOL/L (ref 3–18)
BASOPHILS # BLD: 0.02 K/UL (ref 0–0.1)
BASOPHILS NFR BLD: 0.2 % (ref 0–2)
BUN SERPL-MCNC: 7 MG/DL (ref 6–23)
BUN/CREAT SERPL: 7 (ref 12–20)
CALCIUM SERPL-MCNC: 9.7 MG/DL (ref 8.5–10.1)
CHLORIDE SERPL-SCNC: 102 MMOL/L (ref 98–107)
CO2 SERPL-SCNC: 25 MMOL/L (ref 21–32)
CREAT SERPL-MCNC: 1.11 MG/DL (ref 0.6–1.3)
DIFFERENTIAL METHOD BLD: ABNORMAL
EOSINOPHIL # BLD: 0.49 K/UL (ref 0–0.4)
EOSINOPHIL NFR BLD: 5.8 % (ref 0–5)
ERYTHROCYTE [DISTWIDTH] IN BLOOD BY AUTOMATED COUNT: 12.5 % (ref 11.6–14.5)
GLUCOSE SERPL-MCNC: 85 MG/DL (ref 74–108)
HCT VFR BLD AUTO: 34.7 % (ref 36–48)
HGB BLD-MCNC: 11.3 G/DL (ref 13–16)
IMM GRANULOCYTES # BLD AUTO: 0.03 K/UL (ref 0–0.04)
IMM GRANULOCYTES NFR BLD AUTO: 0.4 % (ref 0–0.5)
LYMPHOCYTES # BLD: 1.34 K/UL (ref 0.9–3.6)
LYMPHOCYTES NFR BLD: 15.8 % (ref 21–52)
MCH RBC QN AUTO: 32.8 PG (ref 24–34)
MCHC RBC AUTO-ENTMCNC: 32.6 G/DL (ref 31–37)
MCV RBC AUTO: 100.9 FL (ref 78–100)
MONOCYTES # BLD: 1.14 K/UL (ref 0.05–1.2)
MONOCYTES NFR BLD: 13.4 % (ref 3–10)
NEUTS SEG # BLD: 5.46 K/UL (ref 1.8–8)
NEUTS SEG NFR BLD: 64.4 % (ref 40–73)
NRBC # BLD: 0 K/UL (ref 0–0.01)
NRBC BLD-RTO: 0 PER 100 WBC
PLATELET # BLD AUTO: 251 K/UL (ref 135–420)
PMV BLD AUTO: 10.8 FL (ref 9.2–11.8)
POTASSIUM SERPL-SCNC: 3.9 MMOL/L (ref 3.5–5.5)
RBC # BLD AUTO: 3.44 M/UL (ref 4.35–5.65)
SODIUM SERPL-SCNC: 139 MMOL/L (ref 136–145)
WBC # BLD AUTO: 8.5 K/UL (ref 4.6–13.2)

## 2025-07-17 PROCEDURE — 94761 N-INVAS EAR/PLS OXIMETRY MLT: CPT

## 2025-07-17 PROCEDURE — 2580000003 HC RX 258: Performed by: STUDENT IN AN ORGANIZED HEALTH CARE EDUCATION/TRAINING PROGRAM

## 2025-07-17 PROCEDURE — 6370000000 HC RX 637 (ALT 250 FOR IP): Performed by: STUDENT IN AN ORGANIZED HEALTH CARE EDUCATION/TRAINING PROGRAM

## 2025-07-17 PROCEDURE — 99239 HOSP IP/OBS DSCHRG MGMT >30: CPT | Performed by: STUDENT IN AN ORGANIZED HEALTH CARE EDUCATION/TRAINING PROGRAM

## 2025-07-17 PROCEDURE — 80048 BASIC METABOLIC PNL TOTAL CA: CPT

## 2025-07-17 PROCEDURE — 36415 COLL VENOUS BLD VENIPUNCTURE: CPT

## 2025-07-17 PROCEDURE — 2500000003 HC RX 250 WO HCPCS: Performed by: STUDENT IN AN ORGANIZED HEALTH CARE EDUCATION/TRAINING PROGRAM

## 2025-07-17 PROCEDURE — 6360000002 HC RX W HCPCS: Performed by: STUDENT IN AN ORGANIZED HEALTH CARE EDUCATION/TRAINING PROGRAM

## 2025-07-17 PROCEDURE — 85025 COMPLETE CBC W/AUTO DIFF WBC: CPT

## 2025-07-17 RX ORDER — AMLODIPINE BESYLATE 10 MG/1
10 TABLET ORAL NIGHTLY
Qty: 30 TABLET | Refills: 3 | Status: SHIPPED | OUTPATIENT
Start: 2025-07-17

## 2025-07-17 RX ORDER — MULTIVITAMIN WITH IRON
1 TABLET ORAL DAILY
Qty: 30 TABLET | Refills: 3 | Status: SHIPPED | OUTPATIENT
Start: 2025-07-17

## 2025-07-17 RX ORDER — FOLIC ACID 1 MG/1
1 TABLET ORAL DAILY
Qty: 30 TABLET | Refills: 3 | Status: SHIPPED | OUTPATIENT
Start: 2025-07-17

## 2025-07-17 RX ORDER — LACTOBACILLUS RHAMNOSUS GG 10B CELL
1 CAPSULE ORAL
Qty: 5 CAPSULE | Refills: 0 | Status: SHIPPED | OUTPATIENT
Start: 2025-07-18

## 2025-07-17 RX ORDER — THIAMINE MONONITRATE (VIT B1) 100 MG
100 TABLET ORAL DAILY
Qty: 30 TABLET | Refills: 3 | Status: SHIPPED | OUTPATIENT
Start: 2025-07-17

## 2025-07-17 RX ORDER — LEVOFLOXACIN 750 MG/1
750 TABLET, FILM COATED ORAL DAILY
Qty: 4 TABLET | Refills: 0 | Status: SHIPPED | OUTPATIENT
Start: 2025-07-17 | End: 2025-07-21

## 2025-07-17 RX ADMIN — Medication 1 CAPSULE: at 09:57

## 2025-07-17 RX ADMIN — ACETAMINOPHEN 650 MG: 325 TABLET ORAL at 00:01

## 2025-07-17 RX ADMIN — SODIUM CHLORIDE, PRESERVATIVE FREE 10 ML: 5 INJECTION INTRAVENOUS at 00:05

## 2025-07-17 RX ADMIN — PIPERACILLIN AND TAZOBACTAM 3375 MG: 3; .375 INJECTION, POWDER, FOR SOLUTION INTRAVENOUS; PARENTERAL at 00:05

## 2025-07-17 RX ADMIN — FOLIC ACID 1 MG: 1 TABLET ORAL at 09:57

## 2025-07-17 RX ADMIN — THERA TABS 1 TABLET: TAB at 09:57

## 2025-07-17 RX ADMIN — Medication 100 MG: at 09:57

## 2025-07-17 RX ADMIN — PIPERACILLIN AND TAZOBACTAM 3375 MG: 3; .375 INJECTION, POWDER, FOR SOLUTION INTRAVENOUS; PARENTERAL at 06:37

## 2025-07-17 RX ADMIN — AMLODIPINE BESYLATE 10 MG: 10 TABLET ORAL at 09:57

## 2025-07-17 RX ADMIN — SODIUM CHLORIDE, PRESERVATIVE FREE 10 ML: 5 INJECTION INTRAVENOUS at 10:02

## 2025-07-17 ASSESSMENT — PAIN SCALES - GENERAL
PAINLEVEL_OUTOF10: 5
PAINLEVEL_OUTOF10: 0

## 2025-07-17 ASSESSMENT — PAIN DESCRIPTION - ORIENTATION: ORIENTATION: LOWER

## 2025-07-17 ASSESSMENT — PAIN DESCRIPTION - LOCATION: LOCATION: BACK

## 2025-07-17 NOTE — PLAN OF CARE
Problem: Pain  Goal: Verbalizes/displays adequate comfort level or baseline comfort level  7/17/2025 1226 by Beverly Bahena RN  Outcome: Adequate for Discharge  Flowsheets (Taken 7/17/2025 1226)  Verbalizes/displays adequate comfort level or baseline comfort level: Encourage patient to monitor pain and request assistance  Note: Patient verbalizes comfort level with low back pain and managed with Tylenol .  7/17/2025 0602 by Dayanna Negron, RN  Outcome: Progressing  Flowsheets (Taken 7/17/2025 0602)  Verbalizes/displays adequate comfort level or baseline comfort level:   Encourage patient to monitor pain and request assistance   Assess pain using appropriate pain scale   Administer analgesics based on type and severity of pain and evaluate response     Problem: Seizure Precautions  Goal: Remains free of injury related to seizures activity  7/17/2025 1226 by Beverly Bahena RN  Outcome: Adequate for Discharge  Note: Patient has remained free of any seizure activity.   7/17/2025 0602 by Dayanna Negron, RN  Outcome: Progressing  Flowsheets (Taken 7/17/2025 0602)  Remains free of injury related to seizure activity:   Maintain airway, patient safety  and administer oxygen as ordered   Monitor patient for seizure activity, document and report duration and description of seizure to Licensed Independent Practitioner     Problem: Safety - Adult  Goal: Free from fall injury  7/17/2025 1226 by Beverly Bahena RN  Outcome: Adequate for Discharge  Note: Patient remains free from falls. Wears yellow non-slip sock to ensure safety.   7/17/2025 0602 by Dayanna Negron RN  Outcome: Progressing  Flowsheets (Taken 7/17/2025 0602)  Free From Fall Injury: Instruct family/caregiver on patient safety     Problem: Chronic Conditions and Co-morbidities  Goal: Patient's chronic conditions and co-morbidity symptoms are monitored and maintained or improved  7/17/2025 1226 by Beverly Bahena RN  Outcome:

## 2025-07-17 NOTE — CARE COORDINATION
D/C order noted for today. Orders reviewed. No needs identified at this time. Bus pass given.  remains available if needed.    Rosalva Cantu, WILDN, RN  Case Management   [phone]

## 2025-07-17 NOTE — PLAN OF CARE
Problem: Pain  Goal: Verbalizes/displays adequate comfort level or baseline comfort level  Outcome: Progressing  Flowsheets (Taken 7/17/2025 0602)  Verbalizes/displays adequate comfort level or baseline comfort level:   Encourage patient to monitor pain and request assistance   Assess pain using appropriate pain scale   Administer analgesics based on type and severity of pain and evaluate response     Problem: Seizure Precautions  Goal: Remains free of injury related to seizures activity  Outcome: Progressing  Flowsheets (Taken 7/17/2025 0602)  Remains free of injury related to seizure activity:   Maintain airway, patient safety  and administer oxygen as ordered   Monitor patient for seizure activity, document and report duration and description of seizure to Licensed Independent Practitioner     Problem: Safety - Adult  Goal: Free from fall injury  Outcome: Progressing  Flowsheets (Taken 7/17/2025 0602)  Free From Fall Injury: Instruct family/caregiver on patient safety     Problem: Chronic Conditions and Co-morbidities  Goal: Patient's chronic conditions and co-morbidity symptoms are monitored and maintained or improved  Outcome: Progressing  Flowsheets (Taken 7/17/2025 0602)  Care Plan - Patient's Chronic Conditions and Co-Morbidity Symptoms are Monitored and Maintained or Improved: Monitor and assess patient's chronic conditions and comorbid symptoms for stability, deterioration, or improvement     Problem: Discharge Planning  Goal: Discharge to home or other facility with appropriate resources  Outcome: Progressing  Flowsheets (Taken 7/17/2025 0602)  Discharge to home or other facility with appropriate resources:   Identify barriers to discharge with patient and caregiver   Identify discharge learning needs (meds, wound care, etc)     Problem: Infection - Adult  Goal: Absence of infection during hospitalization  Outcome: Progressing  Flowsheets (Taken 7/17/2025 0602)  Absence of infection during

## 2025-07-17 NOTE — PROGRESS NOTES
Infectious Disease progress Note        Reason: Sepsis, multifocal pneumonia    Current abx Prior abx   Piperacillin/tazobactam till 7/14      Lines:       Assessment :  55 y.o. male with history of alcoholism, marijuana use presented to [facility] on 7/14/2025 with generalized weakness.    Clinical presentation consistent with sepsis-present on admission as evidenced by hypothermia, tachypnea due to multifocal pneumonia/aspiration pneumonia    Patient seems to be responding to current antibiotics.  Nasal MRSA screen 7/15-negative    Clinically better from infectious disease standpoint      Recommendations:    discontinue piperacillin/tazobactam  Start p.o. levofloxacin, Augmentin till 7/21/2025  Discharge planning per primary team     Above plan was discussed in details with patient, and dr Bowens. Please call me if any further questions or concerns. Will continue to participate in the care of this patient.  HPI:        Patient states that he feels better today   Denies any runny nose, sore throat.  Eager to leave.  No new complaints.  Past Medical History:   Diagnosis Date    Alcoholism (HCC)     Homelessness     Hypertension     Ill-defined condition     ETOH    Marijuana smoker        No past surgical history on file.    @[email or web address]    Current Facility-Administered Medications   Medication Dose Route Frequency    lactobacillus (CULTURELLE) capsule 1 capsule  1 capsule Oral Daily with breakfast    amLODIPine (NORVASC) tablet 10 mg  10 mg Oral Daily    benzonatate (TESSALON) capsule 200 mg  200 mg Oral TID PRN    sodium chloride flush 0.9 % injection 5-40 mL  5-40 mL IntraVENous 2 times per day    sodium chloride flush 0.9 % injection 5-40 mL  5-40 mL IntraVENous PRN    0.9 % sodium chloride infusion   IntraVENous PRN    thiamine mononitrate tablet 100 mg  100 mg Oral Daily    folic acid (FOLVITE) tablet 1 mg  1 mg Oral Daily    multivitamin 1 tablet  1 tablet Oral Daily    piperacillin-tazobactam (ZOSYN) 3,375 mg in  sodium chloride 0.9 % 50 mL IVPB (addEASE)  3,375 mg IntraVENous Q8H    sodium chloride flush 0.9 % injection 5-40 mL  5-40 mL IntraVENous 2 times per day    sodium chloride flush 0.9 % injection 5-40 mL  5-40 mL IntraVENous PRN    0.9 % sodium chloride infusion   IntraVENous PRN    potassium chloride (KLOR-CON M) extended release tablet 40 mEq  40 mEq Oral PRN    Or    potassium bicarb-citric acid (EFFER-K) effervescent tablet 40 mEq  40 mEq Oral PRN    Or    potassium chloride 10 mEq/100 mL IVPB (Peripheral Line)  10 mEq IntraVENous PRN    magnesium sulfate 2000 mg in 50 mL IVPB premix  2,000 mg IntraVENous PRN    enoxaparin (LOVENOX) injection 40 mg  40 mg SubCUTAneous Daily    ondansetron (ZOFRAN-ODT) disintegrating tablet 4 mg  4 mg Oral Q8H PRN    Or    ondansetron (ZOFRAN) injection 4 mg  4 mg IntraVENous Q6H PRN    polyethylene glycol (GLYCOLAX) packet 17 g  17 g Oral Daily PRN    acetaminophen (TYLENOL) tablet 650 mg  650 mg Oral Q6H PRN    Or    acetaminophen (TYLENOL) suppository 650 mg  650 mg Rectal Q6H PRN    hydrALAZINE (APRESOLINE) tablet 25 mg  25 mg Oral Q8H PRN       Allergies: Patient has no known allergies.    Family History   Problem Relation Age of Onset    Diabetes type 2  Mother      Social History     Socioeconomic History    Marital status: Single     Spouse name: Not on file    Number of children: Not on file    Years of education: Not on file    Highest education level: Not on file   Occupational History    Not on file   Tobacco Use    Smoking status: Former     Passive exposure: Never    Smokeless tobacco: Not on file   Substance and Sexual Activity    Alcohol use: Yes     Comment: one pint a day    Drug use: Yes     Types: Marijuana (Weed)     Comment: daily    Sexual activity: Not on file   Other Topics Concern    Not on file   Social History Narrative    Not on file     Social Drivers of Health     Financial Resource Strain: Not on file   Food Insecurity: Food Insecurity Present

## 2025-07-17 NOTE — DISCHARGE SUMMARY
[facility]    Discharge Summary    Patient: Arnold Keys MRN: [id number]  [facility]: [id number]    YOB: 1970  Age: 55 y.o.  Sex: male    DOA: 7/14/2025 LOS:  LOS: 3 days   Discharge Date:      Admission Diagnoses: Syncope and collapse [R55]  Hypokalemia [E87.6]  Hypoglycemia [E16.2]  Hypothermia, initial encounter [T68.XXXA]  Multifocal pneumonia [J18.9]  Pneumonia due to organism [J18.9]    Discharge Diagnoses:    Hospital Problems           Last Modified POA    * (Principal) Multifocal pneumonia 7/14/2025 Yes    Sepsis (HCC) 7/15/2025 Yes    Pneumonia due to organism 7/14/2025 Yes   Sepsis secondary to bilateral multifocal pneumonia, as evidenced by hypothermia, tachypnea   Colitis  Hypothermia-improved  Bradycardia-improved  Lactic acidosis-resolved  Hypokalemia  Alcoholism  Hypertension  Marijuana use    Discharge Condition: Stable    Discharge To: Home    Consults: Infectious Disease    HPI:   Per admission HPI  55 y.o. male with a PMHx as below who presented to the ED with general fatigue.  Patient states that it started yesterday.  Patient is homeless and denies any fevers, chills, abdominal pain, nausea, chest pain, cough, palpitations, shortness of breath, rashes, or FND.  Patient admits to being a drinker but denies any recent aspiration.     In the ED patients temperature was 92.4, respiratory rate 24, heart rate 40, blood pressure 134/89, saturating well on room air..  He was placed on Keren hugger.  Lab work was remarkable for lactic acid 3.65, potassium 2.9, glucose 45.  CXR showed moderate congestion with poor aeration and right upper lobe opacity.  CT of the head was unremarkable.  CTA of the chest was negative for pulmonary embolism but did show a multifocal pneumonia right greater than left with mild bronchitis and emphysema as well as trace bilateral pleural effusions.  CT of the abdomen pelvis showed ileus with multiple loops of moderately

## 2025-08-23 ENCOUNTER — HOSPITAL ENCOUNTER (EMERGENCY)
Facility: HOSPITAL | Age: 55
Discharge: HOME | End: 2025-08-23
Attending: STUDENT IN AN ORGANIZED HEALTH CARE EDUCATION/TRAINING PROGRAM
Payer: MEDICAID

## 2025-08-23 VITALS
HEIGHT: 70 IN | HEART RATE: 42 BPM | OXYGEN SATURATION: 97 % | BODY MASS INDEX: 23.05 KG/M2 | TEMPERATURE: 97.4 F | DIASTOLIC BLOOD PRESSURE: 80 MMHG | WEIGHT: 161 LBS | SYSTOLIC BLOOD PRESSURE: 122 MMHG | RESPIRATION RATE: 15 BRPM

## 2025-08-23 DIAGNOSIS — E16.2 HYPOGLYCEMIA: Primary | ICD-10-CM

## 2025-08-23 DIAGNOSIS — F10.920 ACUTE ALCOHOLIC INTOXICATION WITHOUT COMPLICATION: ICD-10-CM

## 2025-08-23 DIAGNOSIS — E86.0 DEHYDRATION: ICD-10-CM

## 2025-08-23 LAB
ALBUMIN SERPL-MCNC: 4.3 G/DL (ref 3.4–5)
ALBUMIN/GLOB SERPL: 1.1 (ref 0.8–1.7)
ALP SERPL-CCNC: 72 U/L (ref 45–117)
ALT SERPL-CCNC: 21 U/L (ref 10–50)
ANION GAP SERPL CALC-SCNC: 19 MMOL/L (ref 3–18)
AST SERPL-CCNC: 34 U/L (ref 10–38)
BASOPHILS # BLD: 0.01 K/UL (ref 0–0.1)
BASOPHILS NFR BLD: 0.1 % (ref 0–2)
BILIRUB SERPL-MCNC: 0.6 MG/DL (ref 0.2–1)
BUN SERPL-MCNC: 11 MG/DL (ref 6–23)
BUN/CREAT SERPL: 12 (ref 12–20)
CALCIUM SERPL-MCNC: 9.9 MG/DL (ref 8.5–10.1)
CHLORIDE SERPL-SCNC: 103 MMOL/L (ref 98–107)
CO2 SERPL-SCNC: 19 MMOL/L (ref 21–32)
CREAT SERPL-MCNC: 0.98 MG/DL (ref 0.6–1.3)
DIFFERENTIAL METHOD BLD: ABNORMAL
EOSINOPHIL # BLD: 0.3 K/UL (ref 0–0.4)
EOSINOPHIL NFR BLD: 3.7 % (ref 0–5)
ERYTHROCYTE [DISTWIDTH] IN BLOOD BY AUTOMATED COUNT: 14.1 % (ref 11.6–14.5)
ETHANOL SERPL-MCNC: 146 MG/DL (ref 0–0.08)
GLOBULIN SER CALC-MCNC: 3.8 G/DL (ref 2–4)
GLUCOSE BLD STRIP.AUTO-MCNC: 135 MG/DL (ref 70–110)
GLUCOSE BLD STRIP.AUTO-MCNC: 154 MG/DL (ref 70–110)
GLUCOSE BLD STRIP.AUTO-MCNC: 43 MG/DL (ref 70–110)
GLUCOSE BLD STRIP.AUTO-MCNC: 50 MG/DL (ref 70–110)
GLUCOSE SERPL-MCNC: 42 MG/DL (ref 74–108)
HCT VFR BLD AUTO: 40.5 % (ref 36–48)
HGB BLD-MCNC: 12.9 G/DL (ref 13–16)
IMM GRANULOCYTES # BLD AUTO: 0.01 K/UL (ref 0–0.04)
IMM GRANULOCYTES NFR BLD AUTO: 0.1 % (ref 0–0.5)
LYMPHOCYTES # BLD: 2.88 K/UL (ref 0.9–3.6)
LYMPHOCYTES NFR BLD: 35.5 % (ref 21–52)
MAGNESIUM SERPL-MCNC: 1.7 MG/DL (ref 1.6–2.6)
MCH RBC QN AUTO: 33.4 PG (ref 24–34)
MCHC RBC AUTO-ENTMCNC: 31.9 G/DL (ref 31–37)
MCV RBC AUTO: 104.9 FL (ref 78–100)
MONOCYTES # BLD: 0.65 K/UL (ref 0.05–1.2)
MONOCYTES NFR BLD: 8 % (ref 3–10)
NEUTS SEG # BLD: 4.27 K/UL (ref 1.8–8)
NEUTS SEG NFR BLD: 52.6 % (ref 40–73)
NRBC # BLD: 0 K/UL (ref 0–0.01)
NRBC BLD-RTO: 0 PER 100 WBC
PLATELET # BLD AUTO: 271 K/UL (ref 135–420)
PMV BLD AUTO: 10.2 FL (ref 9.2–11.8)
POTASSIUM SERPL-SCNC: 3.5 MMOL/L (ref 3.5–5.5)
PROT SERPL-MCNC: 8.1 G/DL (ref 6.4–8.2)
RBC # BLD AUTO: 3.86 M/UL (ref 4.35–5.65)
SODIUM SERPL-SCNC: 142 MMOL/L (ref 136–145)
TSH, 3RD GENERATION: 0.94 UIU/ML (ref 0.27–4.2)
WBC # BLD AUTO: 8.1 K/UL (ref 4.6–13.2)

## 2025-08-23 PROCEDURE — 83735 ASSAY OF MAGNESIUM: CPT

## 2025-08-23 PROCEDURE — 80053 COMPREHEN METABOLIC PANEL: CPT

## 2025-08-23 PROCEDURE — 96365 THER/PROPH/DIAG IV INF INIT: CPT

## 2025-08-23 PROCEDURE — 82077 ASSAY SPEC XCP UR&BREATH IA: CPT

## 2025-08-23 PROCEDURE — 82962 GLUCOSE BLOOD TEST: CPT

## 2025-08-23 PROCEDURE — 84443 ASSAY THYROID STIM HORMONE: CPT

## 2025-08-23 PROCEDURE — 85025 COMPLETE CBC W/AUTO DIFF WBC: CPT

## 2025-08-23 PROCEDURE — 2580000003 HC RX 258: Performed by: STUDENT IN AN ORGANIZED HEALTH CARE EDUCATION/TRAINING PROGRAM

## 2025-08-23 PROCEDURE — 93005 ELECTROCARDIOGRAM TRACING: CPT | Performed by: STUDENT IN AN ORGANIZED HEALTH CARE EDUCATION/TRAINING PROGRAM

## 2025-08-23 PROCEDURE — 99284 EMERGENCY DEPT VISIT MOD MDM: CPT

## 2025-08-23 RX ADMIN — DEXTROSE 250 ML: 10 SOLUTION INTRAVENOUS at 20:15

## 2025-08-23 ASSESSMENT — PAIN SCALES - GENERAL: PAINLEVEL_OUTOF10: 0

## 2025-08-23 ASSESSMENT — PAIN - FUNCTIONAL ASSESSMENT: PAIN_FUNCTIONAL_ASSESSMENT: 0-10

## 2025-08-24 LAB
EKG ATRIAL RATE: 42 BPM
EKG DIAGNOSIS: NORMAL
EKG P AXIS: 66 DEGREES
EKG P-R INTERVAL: 160 MS
EKG Q-T INTERVAL: 516 MS
EKG QRS DURATION: 114 MS
EKG QTC CALCULATION (BAZETT): 430 MS
EKG R AXIS: 57 DEGREES
EKG T AXIS: 63 DEGREES
EKG VENTRICULAR RATE: 42 BPM

## 2025-08-24 PROCEDURE — 93010 ELECTROCARDIOGRAM REPORT: CPT | Performed by: INTERNAL MEDICINE
